# Patient Record
Sex: MALE | Race: WHITE | NOT HISPANIC OR LATINO
[De-identification: names, ages, dates, MRNs, and addresses within clinical notes are randomized per-mention and may not be internally consistent; named-entity substitution may affect disease eponyms.]

---

## 2018-10-21 ENCOUNTER — TRANSCRIPTION ENCOUNTER (OUTPATIENT)
Age: 78
End: 2018-10-21

## 2018-10-23 PROBLEM — Z00.00 ENCOUNTER FOR PREVENTIVE HEALTH EXAMINATION: Status: ACTIVE | Noted: 2018-10-23

## 2018-10-25 ENCOUNTER — APPOINTMENT (OUTPATIENT)
Dept: ORTHOPEDIC SURGERY | Facility: CLINIC | Age: 78
End: 2018-10-25
Payer: MEDICARE

## 2018-10-25 DIAGNOSIS — Z86.39 PERSONAL HISTORY OF OTHER ENDOCRINE, NUTRITIONAL AND METABOLIC DISEASE: ICD-10-CM

## 2018-10-25 DIAGNOSIS — Z86.79 PERSONAL HISTORY OF OTHER DISEASES OF THE CIRCULATORY SYSTEM: ICD-10-CM

## 2018-10-25 DIAGNOSIS — S52.572A OTHER INTRAARTICULAR FRACTURE OF LOWER END OF LEFT RADIUS, INITIAL ENCOUNTER FOR CLOSED FRACTURE: ICD-10-CM

## 2018-10-25 PROCEDURE — 29125 APPL SHORT ARM SPLINT STATIC: CPT | Mod: LT

## 2018-10-25 PROCEDURE — 73110 X-RAY EXAM OF WRIST: CPT | Mod: 26,LT

## 2018-10-25 PROCEDURE — 99203 OFFICE O/P NEW LOW 30 MIN: CPT | Mod: 25

## 2018-11-01 ENCOUNTER — APPOINTMENT (OUTPATIENT)
Dept: ORTHOPEDIC SURGERY | Facility: CLINIC | Age: 78
End: 2018-11-01
Payer: MEDICARE

## 2018-11-01 PROCEDURE — 99213 OFFICE O/P EST LOW 20 MIN: CPT

## 2018-11-01 PROCEDURE — 73110 X-RAY EXAM OF WRIST: CPT | Mod: 26,LT

## 2018-11-08 ENCOUNTER — APPOINTMENT (OUTPATIENT)
Dept: ORTHOPEDIC SURGERY | Facility: CLINIC | Age: 78
End: 2018-11-08

## 2018-11-12 ENCOUNTER — APPOINTMENT (OUTPATIENT)
Dept: ORTHOPEDIC SURGERY | Facility: CLINIC | Age: 78
End: 2018-11-12
Payer: MEDICARE

## 2018-11-12 VITALS — HEIGHT: 65 IN | BODY MASS INDEX: 31.65 KG/M2 | WEIGHT: 190 LBS

## 2018-11-12 PROCEDURE — 29075 APPL CST ELBW FNGR SHORT ARM: CPT | Mod: LT

## 2018-11-12 PROCEDURE — 99213 OFFICE O/P EST LOW 20 MIN: CPT | Mod: 25

## 2018-11-12 PROCEDURE — 73110 X-RAY EXAM OF WRIST: CPT | Mod: 26,LT

## 2018-12-06 ENCOUNTER — APPOINTMENT (OUTPATIENT)
Dept: ORTHOPEDIC SURGERY | Facility: CLINIC | Age: 78
End: 2018-12-06
Payer: MEDICARE

## 2018-12-06 PROCEDURE — 99213 OFFICE O/P EST LOW 20 MIN: CPT

## 2018-12-06 PROCEDURE — 73110 X-RAY EXAM OF WRIST: CPT | Mod: 26,LT

## 2018-12-19 PROBLEM — S52.572D OTHER CLOSED INTRA-ARTICULAR FRACTURE OF DISTAL END OF LEFT RADIUS WITH ROUTINE HEALING, SUBSEQUENT ENCOUNTER: Status: ACTIVE | Noted: 2018-10-31

## 2018-12-20 ENCOUNTER — APPOINTMENT (OUTPATIENT)
Dept: ORTHOPEDIC SURGERY | Facility: CLINIC | Age: 78
End: 2018-12-20
Payer: MEDICARE

## 2018-12-20 VITALS — HEIGHT: 65 IN | WEIGHT: 190 LBS | BODY MASS INDEX: 31.65 KG/M2

## 2018-12-20 DIAGNOSIS — S52.572D OTHER INTRAARTICULAR FRACTURE OF LOWER END OF LEFT RADIUS, SUBSEQUENT ENCOUNTER FOR CLOSED FRACTURE WITH ROUTINE HEALING: ICD-10-CM

## 2018-12-20 PROCEDURE — 99213 OFFICE O/P EST LOW 20 MIN: CPT

## 2018-12-20 PROCEDURE — 73110 X-RAY EXAM OF WRIST: CPT | Mod: 26,LT

## 2019-05-07 ENCOUNTER — TRANSCRIPTION ENCOUNTER (OUTPATIENT)
Age: 79
End: 2019-05-07

## 2019-07-18 ENCOUNTER — APPOINTMENT (OUTPATIENT)
Dept: ULTRASOUND IMAGING | Facility: CLINIC | Age: 79
End: 2019-07-18
Payer: MEDICARE

## 2019-07-18 PROCEDURE — 76775 US EXAM ABDO BACK WALL LIM: CPT

## 2020-06-16 ENCOUNTER — TRANSCRIPTION ENCOUNTER (OUTPATIENT)
Age: 80
End: 2020-06-16

## 2020-07-02 ENCOUNTER — TRANSCRIPTION ENCOUNTER (OUTPATIENT)
Age: 80
End: 2020-07-02

## 2020-10-27 ENCOUNTER — OFFICE (OUTPATIENT)
Dept: URBAN - METROPOLITAN AREA CLINIC 38 | Facility: CLINIC | Age: 80
Setting detail: OPHTHALMOLOGY
End: 2020-10-27
Payer: COMMERCIAL

## 2020-10-27 DIAGNOSIS — H11.153: ICD-10-CM

## 2020-10-27 DIAGNOSIS — H18.503: ICD-10-CM

## 2020-10-27 DIAGNOSIS — H02.834: ICD-10-CM

## 2020-10-27 DIAGNOSIS — E11.9: ICD-10-CM

## 2020-10-27 DIAGNOSIS — H02.831: ICD-10-CM

## 2020-10-27 DIAGNOSIS — H25.13: ICD-10-CM

## 2020-10-27 DIAGNOSIS — H52.4: ICD-10-CM

## 2020-10-27 PROBLEM — H43.393 VITREOUS FLOATERS; BOTH EYES: Status: ACTIVE | Noted: 2020-10-27

## 2020-10-27 PROCEDURE — 92015 DETERMINE REFRACTIVE STATE: CPT | Performed by: OPHTHALMOLOGY

## 2020-10-27 PROCEDURE — 92004 COMPRE OPH EXAM NEW PT 1/>: CPT | Performed by: OPHTHALMOLOGY

## 2020-10-27 ASSESSMENT — REFRACTION_AUTOREFRACTION
OD_CYLINDER: -2.50
OS_CYLINDER: -2.50
OD_SPHERE: PLANO
OS_SPHERE: PLANO
OD_AXIS: 089
OS_AXIS: 079

## 2020-10-27 ASSESSMENT — REFRACTION_MANIFEST
OU_VA: 20/25-2
OS_VA2: 20/20
OS_VA1: 20/30
OD_CYLINDER: -2.00
OD_VA1: 20/30
OS_AXIS: 080
OS_CYLINDER: -2.00
OS_ADD: +1.00
OS_SPHERE: PLANO
OD_SPHERE: PLANO
OU_VA: 20/25-2
OS_SPHERE: PLANO
OS_AXIS: 080
OD_AXIS: 090
OD_SPHERE: PLANO
OS_ADD: +2.75
OD_VA2: 20/20
OD_CYLINDER: -2.00
OS_VA2: 20/20
OD_VA2: 20/20
OS_VA1: 20/30
OD_ADD: +2.75
OD_VA1: 20/30
OD_AXIS: 090
OS_CYLINDER: -2.00
OD_ADD: +1.00

## 2020-10-27 ASSESSMENT — CONFRONTATIONAL VISUAL FIELD TEST (CVF)
OD_FINDINGS: FULL
OS_FINDINGS: FULL

## 2020-10-27 ASSESSMENT — KERATOMETRY
OD_AXISANGLE_DEGREES: 178
OS_K2POWER_DIOPTERS: 45.50
OD_K1POWER_DIOPTERS: 44.25
OS_K1POWER_DIOPTERS: 44.50
METHOD_AUTO_MANUAL: AUTO
OD_K2POWER_DIOPTERS: 45.50
OS_AXISANGLE_DEGREES: 168

## 2020-10-27 ASSESSMENT — VISUAL ACUITY
OS_BCVA: 20/40-2
OD_BCVA: 20/40-2

## 2020-10-27 ASSESSMENT — CORNEAL SURGICAL SCARRING
OD_SCARRING: ANTERIOR
OS_SCARRING: ANTERIOR

## 2020-10-27 ASSESSMENT — LID POSITION - DERMATOCHALASIS
OS_DERMATOCHALASIS: LUL 1+ 2+
OD_DERMATOCHALASIS: RUL 1+ 2+

## 2021-02-16 ENCOUNTER — TRANSCRIPTION ENCOUNTER (OUTPATIENT)
Age: 81
End: 2021-02-16

## 2021-02-22 ENCOUNTER — APPOINTMENT (OUTPATIENT)
Dept: ORTHOPEDIC SURGERY | Facility: CLINIC | Age: 81
End: 2021-02-22
Payer: MEDICARE

## 2021-02-22 VITALS — WEIGHT: 195 LBS | HEIGHT: 65 IN | TEMPERATURE: 97.2 F | BODY MASS INDEX: 32.49 KG/M2

## 2021-02-22 DIAGNOSIS — Z78.9 OTHER SPECIFIED HEALTH STATUS: ICD-10-CM

## 2021-02-22 PROCEDURE — 99214 OFFICE O/P EST MOD 30 MIN: CPT

## 2021-02-22 PROCEDURE — 73110 X-RAY EXAM OF WRIST: CPT | Mod: LT

## 2021-02-22 PROCEDURE — 99072 ADDL SUPL MATRL&STAF TM PHE: CPT

## 2021-02-22 RX ORDER — AMLODIPINE BESYLATE 10 MG/1
10 TABLET ORAL
Qty: 30 | Refills: 0 | Status: ACTIVE | COMMUNITY
Start: 2021-02-04

## 2021-02-22 RX ORDER — ALENDRONATE SODIUM 70 MG/1
70 TABLET ORAL
Qty: 12 | Refills: 0 | Status: ACTIVE | COMMUNITY
Start: 2021-02-09

## 2021-02-22 RX ORDER — LANCETS 30 GAUGE
EACH MISCELLANEOUS
Qty: 100 | Refills: 0 | Status: ACTIVE | COMMUNITY
Start: 2021-02-09

## 2021-02-22 RX ORDER — LOSARTAN POTASSIUM 25 MG/1
25 TABLET, FILM COATED ORAL
Qty: 30 | Refills: 0 | Status: ACTIVE | COMMUNITY
Start: 2021-02-04

## 2021-02-22 RX ORDER — BLOOD SUGAR DIAGNOSTIC
STRIP MISCELLANEOUS
Qty: 100 | Refills: 0 | Status: ACTIVE | COMMUNITY
Start: 2021-02-09

## 2021-02-22 RX ORDER — PRAVASTATIN SODIUM 40 MG/1
40 TABLET ORAL
Qty: 30 | Refills: 0 | Status: ACTIVE | COMMUNITY
Start: 2021-02-04

## 2021-02-22 RX ORDER — CLOPIDOGREL BISULFATE 75 MG/1
75 TABLET, FILM COATED ORAL
Qty: 30 | Refills: 0 | Status: ACTIVE | COMMUNITY
Start: 2021-02-04

## 2021-02-22 RX ORDER — METOPROLOL SUCCINATE 50 MG/1
50 TABLET, EXTENDED RELEASE ORAL
Qty: 30 | Refills: 0 | Status: ACTIVE | COMMUNITY
Start: 2021-02-04

## 2021-02-22 RX ORDER — GLIPIZIDE 5 MG/1
5 TABLET ORAL
Qty: 60 | Refills: 0 | Status: ACTIVE | COMMUNITY
Start: 2021-02-04

## 2021-02-22 RX ORDER — MONTELUKAST 10 MG/1
10 TABLET, FILM COATED ORAL
Qty: 30 | Refills: 0 | Status: ACTIVE | COMMUNITY
Start: 2020-11-23

## 2021-02-22 RX ORDER — PIOGLITAZONE HYDROCHLORIDE 15 MG/1
15 TABLET ORAL
Qty: 30 | Refills: 0 | Status: ACTIVE | COMMUNITY
Start: 2021-02-09

## 2021-02-22 NOTE — PHYSICAL EXAM
[de-identified] : - Constitutional: This is an elderly male in no obvious distress.  \par - Psych: Patient is alert and oriented to person, place and time.  Patient has a normal mood and affect.\par - Cardiovascular: Normal pulses throughout the upper extremities.  No significant varicosities are noted in the upper extremities. \par - Neuro: Strength and sensation are intact throughout the upper extremities.  Patient has normal coordination.\par - Respiratory:  Patient exhibits no evidence of shortness of breath or difficulty breathing.\par - Skin: No rashes, lesions, or other abnormalities are noted in the upper extremities.\par \par ---\par \par Examination of his left wrist after the splint was removed demonstrates diffuse swelling.  The splint appeared quite tight.  There is mild tenderness along the distal radius dorsally.  He has near full flexion and extension of the digits.  He is neurovascularly intact distally. [de-identified] : PA, lateral and oblique radiographs of his left wrist demonstrate his old distal radius fracture which healed with some shortening and approximately 10 degrees of angulation dorsal on the lateral.  There is an old ulnar styloid avulsion fracture.  There is a question of a new fracture along the dorsal ulnar corner.  However, this may represent his old fracture.

## 2021-02-22 NOTE — HISTORY OF PRESENT ILLNESS
[Right] : right hand dominant [FreeTextEntry1] : He comes in today for evaluation of a new injury to his left wrist after a fall 5 days ago.  He went to an urgent care clinic and was told he had a small fracture.  He denies other injuries.\par \par He describes his pain as 2 out of 10.\par \par I treated him greater than 2 years ago for a left distal radius fracture.

## 2021-02-22 NOTE — DISCUSSION/SUMMARY
[FreeTextEntry1] : He has findings consistent with a possible nondisplaced left distal radius fracture after a fall 5 days ago.  This may represent his old fracture.  In either case, if this is a new fracture, it is stable and the treatment would be the same.\par \par I had a discussion regarding today's visit, the diagnosis, and treatment recommendations / options.  At this time I recommended protection with a splint and activity modification.  He will follow-up in 2 weeks to assess his progress.\par \par The patient has agreed to this plan of management and has expressed full understanding.  All questions were fully answered to the patient's satisfaction.\par \par I spent at least 30 minutes in total on this patient's visit.  This included: Preparation for the visit, review of the medical records, review of pertinent diagnostic studies, examination and counseling of the patient on the above diagnosis, treatment plan and prognosis, orders of diagnostic tests, medications and/or appropriate procedures and documentation in the medical records of today's visit.

## 2021-03-01 PROBLEM — S52.502A DISTAL RADIUS FRACTURE, LEFT: Status: ACTIVE | Noted: 2021-02-22

## 2021-03-08 ENCOUNTER — APPOINTMENT (OUTPATIENT)
Dept: ORTHOPEDIC SURGERY | Facility: CLINIC | Age: 81
End: 2021-03-08
Payer: MEDICARE

## 2021-03-08 VITALS — WEIGHT: 195 LBS | HEIGHT: 65 IN | TEMPERATURE: 95.1 F | BODY MASS INDEX: 32.49 KG/M2

## 2021-03-08 DIAGNOSIS — S52.502A UNSPECIFIED FRACTURE OF THE LOWER END OF LEFT RADIUS, INITIAL ENCOUNTER FOR CLOSED FRACTURE: ICD-10-CM

## 2021-03-08 PROCEDURE — 99214 OFFICE O/P EST MOD 30 MIN: CPT

## 2021-03-08 PROCEDURE — 73110 X-RAY EXAM OF WRIST: CPT | Mod: LT

## 2021-03-08 PROCEDURE — 99072 ADDL SUPL MATRL&STAF TM PHE: CPT

## 2021-03-08 NOTE — ADDENDUM
[FreeTextEntry1] : I, Tiffanie Roberson, acted solely as a scribe for Dr. Guerrero on this date on 03/08/2021

## 2021-03-08 NOTE — DISCUSSION/SUMMARY
[FreeTextEntry1] : I had a discussion regarding today's visit, the diagnosis and treatment recommendations / options.  At this time, he was instructed to wear the brace as needed, according to her symptoms.  He will follow-up in 4 weeks if needed.\par \par The patient has agreed to the above plan of management and has expressed full understanding.  All questions were fully answered to the patient's satisfaction.\par \par My time spent on this visit included: Preparation for the visit, review of the medical records, review of pertinent diagnostic studies, examination and counseling of the patient on the above diagnosis, treatment plan and prognosis, orders of diagnostic tests, medications and/or appropriate procedures and documentation in the medical records of today's visit.

## 2021-03-08 NOTE — PHYSICAL EXAM
[de-identified] : - Constitutional: This is an elderly male in no obvious distress.  \par - Psych: Patient is alert and oriented to person, place and time.  Patient has a normal mood and affect.\par - Cardiovascular: Normal pulses throughout the upper extremities.  No significant varicosities are noted in the upper extremities. \par - Neuro: Strength and sensation are intact throughout the upper extremities.  Patient has normal coordination.\par - Respiratory:  Patient exhibits no evidence of shortness of breath or difficulty breathing.\par - Skin: No rashes, lesions, or other abnormalities are noted in the upper extremities.\par \par ---\par \par Examination of his left wrist demonstrates residual although decreased swelling.  There is mild residual tenderness along the distal radius dorsally and ulnarly.  He has near full flexion and extension of the digits.  He has improved flexion and extension of the wrist.  He remains neurovascularly intact distally. [de-identified] : PA, lateral and oblique radiographs of his left wrist demonstrate his old distal radius fracture which healed with some shortening and approximately 10 degrees of angulation dorsal on the lateral.  There is an old ulnar styloid avulsion fracture.  There what appears to be a new fracture along the dorsal ulnar corner.  There is no further displacement compared to his prior radiographs.

## 2021-03-08 NOTE — HISTORY OF PRESENT ILLNESS
[Right] : right hand dominant [FreeTextEntry1] : 19 days status post nondisplaced and incomplete left distal radius fracture.  See note from when he was seen in the office 2 weeks ago.  He was instructed on protection with a splint.\par \par He is doing well and he notes a decrease in his pain.\par \par I treated him greater than 2 years ago for a left distal radius fracture.

## 2021-04-12 ENCOUNTER — TRANSCRIPTION ENCOUNTER (OUTPATIENT)
Age: 81
End: 2021-04-12

## 2021-06-10 ENCOUNTER — APPOINTMENT (OUTPATIENT)
Dept: RADIOLOGY | Facility: CLINIC | Age: 81
End: 2021-06-10
Payer: MEDICARE

## 2021-06-10 ENCOUNTER — APPOINTMENT (OUTPATIENT)
Dept: MRI IMAGING | Facility: CLINIC | Age: 81
End: 2021-06-10

## 2021-06-10 PROCEDURE — 73030 X-RAY EXAM OF SHOULDER: CPT | Mod: RT

## 2021-12-27 ENCOUNTER — APPOINTMENT (OUTPATIENT)
Dept: UROLOGY | Facility: CLINIC | Age: 81
End: 2021-12-27
Payer: MEDICARE

## 2021-12-27 ENCOUNTER — NON-APPOINTMENT (OUTPATIENT)
Age: 81
End: 2021-12-27

## 2021-12-27 VITALS
WEIGHT: 195 LBS | DIASTOLIC BLOOD PRESSURE: 65 MMHG | HEIGHT: 65 IN | SYSTOLIC BLOOD PRESSURE: 153 MMHG | HEART RATE: 59 BPM | TEMPERATURE: 97.6 F | BODY MASS INDEX: 32.49 KG/M2

## 2021-12-27 DIAGNOSIS — N32.81 OVERACTIVE BLADDER: ICD-10-CM

## 2021-12-27 DIAGNOSIS — N40.0 BENIGN PROSTATIC HYPERPLASIA WITHOUT LOWER URINARY TRACT SYMPMS: ICD-10-CM

## 2021-12-27 PROCEDURE — 99204 OFFICE O/P NEW MOD 45 MIN: CPT

## 2021-12-27 RX ORDER — MIRABEGRON 25 MG/1
25 TABLET, FILM COATED, EXTENDED RELEASE ORAL
Qty: 90 | Refills: 2 | Status: ACTIVE | COMMUNITY
Start: 2021-12-27 | End: 1900-01-01

## 2021-12-27 RX ORDER — TIOTROPIUM BROMIDE INHALATION SPRAY 1.56 UG/1
1.25 SPRAY, METERED RESPIRATORY (INHALATION)
Qty: 4 | Refills: 0 | Status: DISCONTINUED | COMMUNITY
Start: 2020-09-04 | End: 2021-12-27

## 2021-12-27 NOTE — ASSESSMENT
[FreeTextEntry1] : Patient with the lower urinary tract symptoms with the main symptoms of urgency and nocturia.\par His rectal exam showed mildly enlarged prostate without any suspicious lesion.\par We checked his post voiding residual and it was 20 cc.\par We discussed with the patient's option to start treatment with the Myrbetriq.\par I prescribed patient's Myrbetriq 25 mg a day.\par I want to see patient in 3 weeks.\par

## 2021-12-27 NOTE — PHYSICAL EXAM
[General Appearance - Well Developed] : well developed [General Appearance - Well Nourished] : well nourished [Normal Appearance] : normal appearance [Well Groomed] : well groomed [General Appearance - In No Acute Distress] : no acute distress [Edema] : no peripheral edema [Respiration, Rhythm And Depth] : normal respiratory rhythm and effort [Exaggerated Use Of Accessory Muscles For Inspiration] : no accessory muscle use [Abdomen Soft] : soft [Abdomen Tenderness] : non-tender [Costovertebral Angle Tenderness] : no ~M costovertebral angle tenderness [Urethral Meatus] : meatus normal [Urinary Bladder Findings] : the bladder was normal on palpation [Scrotum] : the scrotum was normal [Testes Mass (___cm)] : there were no testicular masses [No Prostate Nodules] : no prostate nodules [Prostate Size ___ (0-4)] : prostate size [unfilled] (scale: 0-4) [FreeTextEntry1] : PVR 20 cc [Normal Station and Gait] : the gait and station were normal for the patient's age [] : no rash [No Focal Deficits] : no focal deficits [Oriented To Time, Place, And Person] : oriented to person, place, and time [Affect] : the affect was normal [Mood] : the mood was normal [Not Anxious] : not anxious [No Palpable Adenopathy] : no palpable adenopathy

## 2021-12-27 NOTE — HISTORY OF PRESENT ILLNESS
[Urinary Urgency] : urinary urgency [Urinary Frequency] : urinary frequency [Post-Void Dribbling] : post-void dribbling [None] : None [Constant] : constantly [Daily] : occur daily [Worsening] : worsening [FreeTextEntry1] : 81-year-old patient presented today for the lower urinary tract symptoms.  His main problem is a urgency and nocturia for 3 and more times.\par He described his urine stream is strong and constant.  At the end of urination he is able to empty his bladder.  Has no problem to start urination.\par He denies any urological procedures in the past.\par He denies hematuria and smoking\par His PSA was checked by his primary care physician nearly 2 years ago and was normal.

## 2021-12-28 ENCOUNTER — APPOINTMENT (OUTPATIENT)
Dept: CT IMAGING | Facility: CLINIC | Age: 81
End: 2021-12-28
Payer: MEDICARE

## 2021-12-28 PROCEDURE — 70450 CT HEAD/BRAIN W/O DYE: CPT

## 2022-01-07 PROCEDURE — 70450 CT HEAD/BRAIN W/O DYE: CPT | Mod: 26

## 2022-01-07 PROCEDURE — 93010 ELECTROCARDIOGRAM REPORT: CPT

## 2022-01-07 PROCEDURE — 99285 EMERGENCY DEPT VISIT HI MDM: CPT

## 2022-01-07 PROCEDURE — 71045 X-RAY EXAM CHEST 1 VIEW: CPT | Mod: 26

## 2022-01-08 ENCOUNTER — INPATIENT (INPATIENT)
Facility: HOSPITAL | Age: 82
LOS: 2 days | Discharge: HOME | End: 2022-01-11
Attending: STUDENT IN AN ORGANIZED HEALTH CARE EDUCATION/TRAINING PROGRAM
Payer: MEDICARE

## 2022-01-08 ENCOUNTER — OUTPATIENT (OUTPATIENT)
Dept: OUTPATIENT SERVICES | Facility: HOSPITAL | Age: 82
LOS: 1 days | End: 2022-01-08

## 2022-01-09 ENCOUNTER — OUTPATIENT (OUTPATIENT)
Dept: OUTPATIENT SERVICES | Facility: HOSPITAL | Age: 82
LOS: 1 days | End: 2022-01-09

## 2022-01-09 PROCEDURE — 93010 ELECTROCARDIOGRAM REPORT: CPT

## 2022-01-09 PROCEDURE — 93306 TTE W/DOPPLER COMPLETE: CPT | Mod: 26

## 2022-01-10 ENCOUNTER — OUTPATIENT (OUTPATIENT)
Dept: OUTPATIENT SERVICES | Facility: HOSPITAL | Age: 82
LOS: 1 days | End: 2022-01-10

## 2022-01-11 ENCOUNTER — OUTPATIENT (OUTPATIENT)
Dept: OUTPATIENT SERVICES | Facility: HOSPITAL | Age: 82
LOS: 1 days | End: 2022-01-11

## 2022-01-11 PROCEDURE — 99223 1ST HOSP IP/OBS HIGH 75: CPT

## 2022-01-27 DIAGNOSIS — N32.81 OVERACTIVE BLADDER: ICD-10-CM

## 2022-01-27 DIAGNOSIS — I10 ESSENTIAL (PRIMARY) HYPERTENSION: ICD-10-CM

## 2022-01-27 DIAGNOSIS — E11.9 TYPE 2 DIABETES MELLITUS WITHOUT COMPLICATIONS: ICD-10-CM

## 2022-01-27 DIAGNOSIS — Z95.4 PRESENCE OF OTHER HEART-VALVE REPLACEMENT: ICD-10-CM

## 2022-01-27 DIAGNOSIS — Z79.01 LONG TERM (CURRENT) USE OF ANTICOAGULANTS: ICD-10-CM

## 2022-01-27 DIAGNOSIS — G30.1 ALZHEIMER'S DISEASE WITH LATE ONSET: ICD-10-CM

## 2022-01-27 DIAGNOSIS — N17.9 ACUTE KIDNEY FAILURE, UNSPECIFIED: ICD-10-CM

## 2022-01-27 DIAGNOSIS — U07.1 COVID-19: ICD-10-CM

## 2022-01-27 DIAGNOSIS — Z87.891 PERSONAL HISTORY OF NICOTINE DEPENDENCE: ICD-10-CM

## 2022-01-27 DIAGNOSIS — I48.0 PAROXYSMAL ATRIAL FIBRILLATION: ICD-10-CM

## 2022-01-27 DIAGNOSIS — I44.1 ATRIOVENTRICULAR BLOCK, SECOND DEGREE: ICD-10-CM

## 2022-01-27 DIAGNOSIS — E78.5 HYPERLIPIDEMIA, UNSPECIFIED: ICD-10-CM

## 2022-01-27 DIAGNOSIS — Z95.5 PRESENCE OF CORONARY ANGIOPLASTY IMPLANT AND GRAFT: ICD-10-CM

## 2022-01-27 DIAGNOSIS — Z86.711 PERSONAL HISTORY OF PULMONARY EMBOLISM: ICD-10-CM

## 2022-01-27 DIAGNOSIS — I25.10 ATHEROSCLEROTIC HEART DISEASE OF NATIVE CORONARY ARTERY WITHOUT ANGINA PECTORIS: ICD-10-CM

## 2022-01-27 DIAGNOSIS — D53.9 NUTRITIONAL ANEMIA, UNSPECIFIED: ICD-10-CM

## 2022-01-27 DIAGNOSIS — R55 SYNCOPE AND COLLAPSE: ICD-10-CM

## 2022-01-27 DIAGNOSIS — F02.80 DEMENTIA IN OTHER DISEASES CLASSIFIED ELSEWHERE, UNSPECIFIED SEVERITY, WITHOUT BEHAVIORAL DISTURBANCE, PSYCHOTIC DISTURBANCE, MOOD DISTURBANCE, AND ANXIETY: ICD-10-CM

## 2022-02-04 DIAGNOSIS — U07.1 COVID-19: ICD-10-CM

## 2022-02-04 DIAGNOSIS — R00.1 BRADYCARDIA, UNSPECIFIED: ICD-10-CM

## 2022-02-04 DIAGNOSIS — N17.9 ACUTE KIDNEY FAILURE, UNSPECIFIED: ICD-10-CM

## 2022-02-04 DIAGNOSIS — R55 SYNCOPE AND COLLAPSE: ICD-10-CM

## 2022-02-07 ENCOUNTER — APPOINTMENT (OUTPATIENT)
Dept: UROLOGY | Facility: CLINIC | Age: 82
End: 2022-02-07

## 2022-02-07 DIAGNOSIS — N17.9 ACUTE KIDNEY FAILURE, UNSPECIFIED: ICD-10-CM

## 2022-02-07 DIAGNOSIS — U07.1 COVID-19: ICD-10-CM

## 2022-02-07 DIAGNOSIS — R00.1 BRADYCARDIA, UNSPECIFIED: ICD-10-CM

## 2022-02-07 DIAGNOSIS — R55 SYNCOPE AND COLLAPSE: ICD-10-CM

## 2022-02-07 DIAGNOSIS — I45.5 OTHER SPECIFIED HEART BLOCK: ICD-10-CM

## 2022-02-20 DIAGNOSIS — R00.1 BRADYCARDIA, UNSPECIFIED: ICD-10-CM

## 2022-02-20 DIAGNOSIS — R55 SYNCOPE AND COLLAPSE: ICD-10-CM

## 2022-02-20 DIAGNOSIS — U07.1 COVID-19: ICD-10-CM

## 2022-02-20 DIAGNOSIS — N17.9 ACUTE KIDNEY FAILURE, UNSPECIFIED: ICD-10-CM

## 2022-02-20 DIAGNOSIS — I45.5 OTHER SPECIFIED HEART BLOCK: ICD-10-CM

## 2023-11-29 ENCOUNTER — INPATIENT (INPATIENT)
Facility: HOSPITAL | Age: 83
LOS: 1 days | Discharge: ANOTHER HOSPITAL | DRG: 177 | End: 2023-12-01
Attending: INTERNAL MEDICINE | Admitting: INTERNAL MEDICINE
Payer: MEDICARE

## 2023-11-29 VITALS
OXYGEN SATURATION: 100 % | DIASTOLIC BLOOD PRESSURE: 72 MMHG | HEART RATE: 89 BPM | SYSTOLIC BLOOD PRESSURE: 119 MMHG | RESPIRATION RATE: 22 BRPM | TEMPERATURE: 98 F | WEIGHT: 184.97 LBS

## 2023-11-29 DIAGNOSIS — I10 ESSENTIAL (PRIMARY) HYPERTENSION: ICD-10-CM

## 2023-11-29 DIAGNOSIS — R06.02 SHORTNESS OF BREATH: ICD-10-CM

## 2023-11-29 DIAGNOSIS — Z29.9 ENCOUNTER FOR PROPHYLACTIC MEASURES, UNSPECIFIED: ICD-10-CM

## 2023-11-29 DIAGNOSIS — I50.23 ACUTE ON CHRONIC SYSTOLIC (CONGESTIVE) HEART FAILURE: ICD-10-CM

## 2023-11-29 DIAGNOSIS — E78.5 HYPERLIPIDEMIA, UNSPECIFIED: ICD-10-CM

## 2023-11-29 DIAGNOSIS — Z95.2 PRESENCE OF PROSTHETIC HEART VALVE: Chronic | ICD-10-CM

## 2023-11-29 DIAGNOSIS — R91.8 OTHER NONSPECIFIC ABNORMAL FINDING OF LUNG FIELD: ICD-10-CM

## 2023-11-29 DIAGNOSIS — Z98.890 OTHER SPECIFIED POSTPROCEDURAL STATES: Chronic | ICD-10-CM

## 2023-11-29 DIAGNOSIS — I35.0 NONRHEUMATIC AORTIC (VALVE) STENOSIS: ICD-10-CM

## 2023-11-29 DIAGNOSIS — E11.9 TYPE 2 DIABETES MELLITUS WITHOUT COMPLICATIONS: ICD-10-CM

## 2023-11-29 DIAGNOSIS — Z90.49 ACQUIRED ABSENCE OF OTHER SPECIFIED PARTS OF DIGESTIVE TRACT: Chronic | ICD-10-CM

## 2023-11-29 LAB
ALBUMIN SERPL ELPH-MCNC: 3.6 G/DL — SIGNIFICANT CHANGE UP (ref 3.3–5)
ALP SERPL-CCNC: 55 U/L — SIGNIFICANT CHANGE UP (ref 40–120)
ALT FLD-CCNC: 28 U/L — SIGNIFICANT CHANGE UP (ref 10–45)
ANION GAP SERPL CALC-SCNC: 8 MMOL/L — SIGNIFICANT CHANGE UP (ref 5–17)
ANISOCYTOSIS BLD QL: SLIGHT — SIGNIFICANT CHANGE UP
APTT BLD: 35.3 SEC — SIGNIFICANT CHANGE UP (ref 24.5–35.6)
AST SERPL-CCNC: 47 U/L — HIGH (ref 10–40)
BASE EXCESS BLDV CALC-SCNC: -0.1 MMOL/L — SIGNIFICANT CHANGE UP (ref -2–3)
BASOPHILS # BLD AUTO: 0 K/UL — SIGNIFICANT CHANGE UP (ref 0–0.2)
BASOPHILS NFR BLD AUTO: 0 % — SIGNIFICANT CHANGE UP (ref 0–2)
BILIRUB SERPL-MCNC: 0.4 MG/DL — SIGNIFICANT CHANGE UP (ref 0.2–1.2)
BUN SERPL-MCNC: 37 MG/DL — HIGH (ref 7–23)
CALCIUM SERPL-MCNC: 9.7 MG/DL — SIGNIFICANT CHANGE UP (ref 8.4–10.5)
CHLORIDE SERPL-SCNC: 99 MMOL/L — SIGNIFICANT CHANGE UP (ref 96–108)
CO2 BLDV-SCNC: 27 MMOL/L — HIGH (ref 22–26)
CO2 SERPL-SCNC: 29 MMOL/L — SIGNIFICANT CHANGE UP (ref 22–31)
CREAT SERPL-MCNC: 2.08 MG/DL — HIGH (ref 0.5–1.3)
EGFR: 31 ML/MIN/1.73M2 — LOW
EOSINOPHIL # BLD AUTO: 0.14 K/UL — SIGNIFICANT CHANGE UP (ref 0–0.5)
EOSINOPHIL NFR BLD AUTO: 2 % — SIGNIFICANT CHANGE UP (ref 0–6)
GAS PNL BLDV: SIGNIFICANT CHANGE UP
GLUCOSE BLDC GLUCOMTR-MCNC: 403 MG/DL — HIGH (ref 70–99)
GLUCOSE BLDC GLUCOMTR-MCNC: 439 MG/DL — HIGH (ref 70–99)
GLUCOSE SERPL-MCNC: 317 MG/DL — HIGH (ref 70–99)
HCO3 BLDV-SCNC: 26 MMOL/L — SIGNIFICANT CHANGE UP (ref 22–29)
HCT VFR BLD CALC: 31.1 % — LOW (ref 39–50)
HGB BLD-MCNC: 10.1 G/DL — LOW (ref 13–17)
INR BLD: 1.42 RATIO — HIGH (ref 0.85–1.18)
LACTATE SERPL-SCNC: 1.9 MMOL/L — SIGNIFICANT CHANGE UP (ref 0.7–2)
LYMPHOCYTES # BLD AUTO: 0.49 K/UL — LOW (ref 1–3.3)
LYMPHOCYTES # BLD AUTO: 7 % — LOW (ref 13–44)
MACROCYTES BLD QL: SLIGHT — SIGNIFICANT CHANGE UP
MAGNESIUM SERPL-MCNC: 2.1 MG/DL — SIGNIFICANT CHANGE UP (ref 1.6–2.6)
MANUAL SMEAR VERIFICATION: SIGNIFICANT CHANGE UP
MCHC RBC-ENTMCNC: 32.5 GM/DL — SIGNIFICANT CHANGE UP (ref 32–36)
MCHC RBC-ENTMCNC: 35.3 PG — HIGH (ref 27–34)
MCV RBC AUTO: 108.7 FL — HIGH (ref 80–100)
MONOCYTES # BLD AUTO: 0.83 K/UL — SIGNIFICANT CHANGE UP (ref 0–0.9)
MONOCYTES NFR BLD AUTO: 12 % — SIGNIFICANT CHANGE UP (ref 2–14)
NEUTROPHILS # BLD AUTO: 5.49 K/UL — SIGNIFICANT CHANGE UP (ref 1.8–7.4)
NEUTROPHILS NFR BLD AUTO: 79 % — HIGH (ref 43–77)
NRBC # BLD: 0 /100 — SIGNIFICANT CHANGE UP (ref 0–0)
NT-PROBNP SERPL-SCNC: HIGH PG/ML (ref 0–300)
PCO2 BLDV: 49 MMHG — SIGNIFICANT CHANGE UP (ref 42–55)
PH BLDV: 7.33 — SIGNIFICANT CHANGE UP (ref 7.32–7.43)
PLAT MORPH BLD: NORMAL — SIGNIFICANT CHANGE UP
PLATELET # BLD AUTO: 140 K/UL — LOW (ref 150–400)
PO2 BLDV: <35 MMHG — LOW (ref 25–45)
POTASSIUM SERPL-MCNC: 4.5 MMOL/L — SIGNIFICANT CHANGE UP (ref 3.5–5.3)
POTASSIUM SERPL-SCNC: 4.5 MMOL/L — SIGNIFICANT CHANGE UP (ref 3.5–5.3)
PROT SERPL-MCNC: 7.7 G/DL — SIGNIFICANT CHANGE UP (ref 6–8.3)
PROTHROM AB SERPL-ACNC: 16.4 SEC — HIGH (ref 9.5–13)
RAPID RVP RESULT: DETECTED
RBC # BLD: 2.86 M/UL — LOW (ref 4.2–5.8)
RBC # FLD: 17.3 % — HIGH (ref 10.3–14.5)
RBC BLD AUTO: ABNORMAL
SAO2 % BLDV: 27.7 % — LOW (ref 67–88)
SARS-COV-2 RNA SPEC QL NAA+PROBE: DETECTED
SODIUM SERPL-SCNC: 136 MMOL/L — SIGNIFICANT CHANGE UP (ref 135–145)
TROPONIN I, HIGH SENSITIVITY RESULT: 4364.8 NG/L — HIGH
TROPONIN I, HIGH SENSITIVITY RESULT: 4762.3 NG/L — HIGH
WBC # BLD: 6.95 K/UL — SIGNIFICANT CHANGE UP (ref 3.8–10.5)
WBC # FLD AUTO: 6.95 K/UL — SIGNIFICANT CHANGE UP (ref 3.8–10.5)

## 2023-11-29 PROCEDURE — 93010 ELECTROCARDIOGRAM REPORT: CPT

## 2023-11-29 PROCEDURE — 71045 X-RAY EXAM CHEST 1 VIEW: CPT | Mod: 26

## 2023-11-29 PROCEDURE — 99222 1ST HOSP IP/OBS MODERATE 55: CPT

## 2023-11-29 PROCEDURE — 93306 TTE W/DOPPLER COMPLETE: CPT | Mod: 26

## 2023-11-29 PROCEDURE — 99285 EMERGENCY DEPT VISIT HI MDM: CPT

## 2023-11-29 RX ORDER — CHLORHEXIDINE GLUCONATE 213 G/1000ML
1 SOLUTION TOPICAL
Refills: 0 | Status: DISCONTINUED | OUTPATIENT
Start: 2023-11-29 | End: 2023-12-01

## 2023-11-29 RX ORDER — IPRATROPIUM/ALBUTEROL SULFATE 18-103MCG
3 AEROSOL WITH ADAPTER (GRAM) INHALATION EVERY 6 HOURS
Refills: 0 | Status: DISCONTINUED | OUTPATIENT
Start: 2023-11-29 | End: 2023-11-29

## 2023-11-29 RX ORDER — FUROSEMIDE 40 MG
40 TABLET ORAL ONCE
Refills: 0 | Status: COMPLETED | OUTPATIENT
Start: 2023-11-29 | End: 2023-11-29

## 2023-11-29 RX ORDER — INSULIN LISPRO 100/ML
VIAL (ML) SUBCUTANEOUS EVERY 6 HOURS
Refills: 0 | Status: DISCONTINUED | OUTPATIENT
Start: 2023-11-29 | End: 2023-11-30

## 2023-11-29 RX ORDER — HEPARIN SODIUM 5000 [USP'U]/ML
5000 INJECTION INTRAVENOUS; SUBCUTANEOUS ONCE
Refills: 0 | Status: COMPLETED | OUTPATIENT
Start: 2023-11-29 | End: 2023-11-29

## 2023-11-29 RX ORDER — REMDESIVIR 5 MG/ML
100 INJECTION INTRAVENOUS EVERY 24 HOURS
Refills: 0 | Status: DISCONTINUED | OUTPATIENT
Start: 2023-11-30 | End: 2023-12-01

## 2023-11-29 RX ORDER — CEFEPIME 1 G/1
2000 INJECTION, POWDER, FOR SOLUTION INTRAMUSCULAR; INTRAVENOUS ONCE
Refills: 0 | Status: COMPLETED | OUTPATIENT
Start: 2023-11-29 | End: 2023-11-29

## 2023-11-29 RX ORDER — HEPARIN SODIUM 5000 [USP'U]/ML
INJECTION INTRAVENOUS; SUBCUTANEOUS
Qty: 25000 | Refills: 0 | Status: DISCONTINUED | OUTPATIENT
Start: 2023-11-29 | End: 2023-11-30

## 2023-11-29 RX ORDER — AMLODIPINE BESYLATE 2.5 MG/1
10 TABLET ORAL DAILY
Refills: 0 | Status: DISCONTINUED | OUTPATIENT
Start: 2023-11-29 | End: 2023-11-30

## 2023-11-29 RX ORDER — METOPROLOL TARTRATE 50 MG
25 TABLET ORAL EVERY 12 HOURS
Refills: 0 | Status: DISCONTINUED | OUTPATIENT
Start: 2023-11-29 | End: 2023-11-30

## 2023-11-29 RX ORDER — CEFEPIME 1 G/1
2000 INJECTION, POWDER, FOR SOLUTION INTRAMUSCULAR; INTRAVENOUS EVERY 12 HOURS
Refills: 0 | Status: DISCONTINUED | OUTPATIENT
Start: 2023-11-29 | End: 2023-12-01

## 2023-11-29 RX ORDER — SODIUM CHLORIDE 9 MG/ML
500 INJECTION INTRAMUSCULAR; INTRAVENOUS; SUBCUTANEOUS ONCE
Refills: 0 | Status: DISCONTINUED | OUTPATIENT
Start: 2023-11-29 | End: 2023-11-29

## 2023-11-29 RX ORDER — INSULIN GLARGINE 100 [IU]/ML
15 INJECTION, SOLUTION SUBCUTANEOUS AT BEDTIME
Refills: 0 | Status: DISCONTINUED | OUTPATIENT
Start: 2023-11-29 | End: 2023-12-01

## 2023-11-29 RX ORDER — ASPIRIN/CALCIUM CARB/MAGNESIUM 324 MG
81 TABLET ORAL DAILY
Refills: 0 | Status: DISCONTINUED | OUTPATIENT
Start: 2023-11-30 | End: 2023-12-01

## 2023-11-29 RX ORDER — REMDESIVIR 5 MG/ML
200 INJECTION INTRAVENOUS EVERY 24 HOURS
Refills: 0 | Status: COMPLETED | OUTPATIENT
Start: 2023-11-29 | End: 2023-11-29

## 2023-11-29 RX ORDER — IPRATROPIUM/ALBUTEROL SULFATE 18-103MCG
3 AEROSOL WITH ADAPTER (GRAM) INHALATION ONCE
Refills: 0 | Status: DISCONTINUED | OUTPATIENT
Start: 2023-11-29 | End: 2023-11-29

## 2023-11-29 RX ORDER — PANTOPRAZOLE SODIUM 20 MG/1
40 TABLET, DELAYED RELEASE ORAL
Refills: 0 | Status: DISCONTINUED | OUTPATIENT
Start: 2023-11-29 | End: 2023-12-01

## 2023-11-29 RX ORDER — ASPIRIN/CALCIUM CARB/MAGNESIUM 324 MG
325 TABLET ORAL ONCE
Refills: 0 | Status: COMPLETED | OUTPATIENT
Start: 2023-11-29 | End: 2023-11-29

## 2023-11-29 RX ORDER — ACETAMINOPHEN 500 MG
650 TABLET ORAL EVERY 6 HOURS
Refills: 0 | Status: DISCONTINUED | OUTPATIENT
Start: 2023-11-29 | End: 2023-12-01

## 2023-11-29 RX ORDER — ALBUTEROL 90 UG/1
1 AEROSOL, METERED ORAL EVERY 4 HOURS
Refills: 0 | Status: DISCONTINUED | OUTPATIENT
Start: 2023-11-29 | End: 2023-12-01

## 2023-11-29 RX ORDER — HEPARIN SODIUM 5000 [USP'U]/ML
5000 INJECTION INTRAVENOUS; SUBCUTANEOUS EVERY 6 HOURS
Refills: 0 | Status: DISCONTINUED | OUTPATIENT
Start: 2023-11-29 | End: 2023-11-30

## 2023-11-29 RX ORDER — DEXAMETHASONE 0.5 MG/5ML
6 ELIXIR ORAL DAILY
Refills: 0 | Status: DISCONTINUED | OUTPATIENT
Start: 2023-11-29 | End: 2023-12-01

## 2023-11-29 RX ORDER — ASCORBIC ACID 60 MG
500 TABLET,CHEWABLE ORAL DAILY
Refills: 0 | Status: DISCONTINUED | OUTPATIENT
Start: 2023-11-29 | End: 2023-11-30

## 2023-11-29 RX ORDER — SIMVASTATIN 20 MG/1
40 TABLET, FILM COATED ORAL AT BEDTIME
Refills: 0 | Status: DISCONTINUED | OUTPATIENT
Start: 2023-11-29 | End: 2023-12-01

## 2023-11-29 RX ORDER — REMDESIVIR 5 MG/ML
INJECTION INTRAVENOUS
Refills: 0 | Status: DISCONTINUED | OUTPATIENT
Start: 2023-11-29 | End: 2023-12-01

## 2023-11-29 RX ORDER — IPRATROPIUM/ALBUTEROL SULFATE 18-103MCG
3 AEROSOL WITH ADAPTER (GRAM) INHALATION
Refills: 0 | Status: COMPLETED | OUTPATIENT
Start: 2023-11-29 | End: 2023-11-29

## 2023-11-29 RX ADMIN — Medication 125 MILLIGRAM(S): at 14:32

## 2023-11-29 RX ADMIN — Medication 325 MILLIGRAM(S): at 19:30

## 2023-11-29 RX ADMIN — SIMVASTATIN 40 MILLIGRAM(S): 20 TABLET, FILM COATED ORAL at 22:36

## 2023-11-29 RX ADMIN — Medication 3 MILLILITER(S): at 14:12

## 2023-11-29 RX ADMIN — Medication 12: at 23:52

## 2023-11-29 RX ADMIN — Medication 3 MILLILITER(S): at 13:52

## 2023-11-29 RX ADMIN — Medication 100 MILLIGRAM(S): at 22:35

## 2023-11-29 RX ADMIN — Medication 40 MILLIGRAM(S): at 15:33

## 2023-11-29 RX ADMIN — HEPARIN SODIUM 5000 UNIT(S): 5000 INJECTION INTRAVENOUS; SUBCUTANEOUS at 19:28

## 2023-11-29 RX ADMIN — CEFEPIME 2000 MILLIGRAM(S): 1 INJECTION, POWDER, FOR SOLUTION INTRAMUSCULAR; INTRAVENOUS at 16:07

## 2023-11-29 RX ADMIN — REMDESIVIR 200 MILLIGRAM(S): 5 INJECTION INTRAVENOUS at 23:51

## 2023-11-29 RX ADMIN — Medication 3 MILLILITER(S): at 14:32

## 2023-11-29 RX ADMIN — CEFEPIME 100 MILLIGRAM(S): 1 INJECTION, POWDER, FOR SOLUTION INTRAMUSCULAR; INTRAVENOUS at 15:33

## 2023-11-29 RX ADMIN — HEPARIN SODIUM 1000 UNIT(S)/HR: 5000 INJECTION INTRAVENOUS; SUBCUTANEOUS at 19:29

## 2023-11-29 NOTE — H&P ADULT - PROBLEM SELECTOR PLAN 3
status post TAVR, TTE this evening with good function of valve.  Continue AC with heparin, medical management

## 2023-11-29 NOTE — ED ADULT TRIAGE NOTE - NS ED NURSE AMBULANCES
[facility]

## 2023-11-29 NOTE — ED PROVIDER NOTE - PHYSICAL EXAMINATION
VITAL SIGNS: I have reviewed nursing notes and confirm.   GEN: Well-developed; well-nourished; in mild-mod acute respiratory stress. Speaking full sentences.  SKIN: Warm, pink, no rash, no diaphoresis, no cyanosis, well perfused.   HEAD: Normocephalic; atraumatic. No scalp lacerations, no abrasions.  NECK: Supple; non tender.   EYES: Pupils 3mm equal, round, reactive to light and accomodation, conjunctiva and sclera clear. Extra-ocular movements intact bilaterally.  ENT: No nasal discharge; airway clear. Trachea is midline. Normal dentition.  CV: RRR. S1, S2 normal; no murmurs, gallops, or rubs. Capillary refill < 2 seconds throughout. Distal pulses intact 2+ throughout.  RESP: (+) poor air entry, (+) mild rhonchi/wheezing/ bibasilar rales. (+ )belly breathing, retractions.  ABD: Normal bowel sounds, soft, non-distended, non-tender, no rebound, no guarding, no rigidity, no hepatosplenomegaly.  MSK: Normal range of motion and movement of all 4 extremities. No apparent joint or muscular pain throughout.    BACK: No thoracolumbar midline or paravertebral tenderness. No step-offs or obvious deformities.  NEURO: Alert & oriented x 3, Gait: Fluid. Normal speech and coordination.

## 2023-11-29 NOTE — H&P ADULT - NSHPSOCIALHISTORY_GEN_ALL_CORE
Lives in private home with wife, adult children involved in care  Remote history of cigarette use  No history of ETOH abuse, substance abuse, or illicit substance use

## 2023-11-29 NOTE — H&P ADULT - PROBLEM SELECTOR PLAN 5
Found this spring, no PET scan or biopsy done to date.  May have underlying component of COPD worsening SOB, continue bronchodilators RTC.

## 2023-11-29 NOTE — PROGRESS NOTE ADULT - ASSESSMENT
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, type 2 DM, right lung mass  admitted to the ICU For    # AHRF, Type 1  # Covid 19  # LLL PNA  # NSTEMI v Myocarditis 2/2 COVID  # Acute Systolic Heart Failure  # ALEXI    Neuro:  - Avoid Neuro Deliriogenic / sedative medications    CV:  - Maintain MAP > 65  - TTE showing EF of 45-50%, with apical and mid inferior hypokinesis  - ASA/Statin/BB,   - EKG AV Paced  - Avoiding fluid overload given new reduction in EF  - Eventual Ischemic evaluation when medically stable  - Cardiology following    Pulm:  - Continue Decadron for enhanced anti-inflammatory effect  - Actively titrating Bipap settings to maintain SpO2 > 92%, will transition to HFNC as tolerated  - Incentive spirometry as able                  GI:  - NPO while Bipap    Renal:  - Even to net negative fluid balance as tolerated by hemodynamics and renal fx.    - Continue to monitor Bun/Cr and UOP  - Replacing electrolytes as needed with Goal K> 4, PO> 3, Mg> 2               - Strict I&O's  - Avoid Nephro toxic medication  - Renally dose meds    Heme:  - Heparin gtt got NSTEMI    ID:  - Jhony/Dacdron for Covid, Trend renal funciton with Jhony use  - Cefepime for PNA, will add Azithro for Atypical coverage  - Microbiology and Radiology reviewed   - trend CBC with diff, CMP  and fever curve    Endo:  - ISS for aggressive glycemic control to limit FS glucose to < 180mg/dl.  - Keep Euthyroid    COVID 19 specific considerations and therpeutic options based on the available and rapidly changing literature    Critical Care Time (EXCLUSIVE of any non bundled procedures) :  45 minutes were spent assessing the patient's presenting problems of acute illness that pose a high probability of life threatening  deterioration or end organ damage / dysfunction.  Medical desicion making includes initiation / continuation of plan or care review data/ labwork/ radiographic study, direct patient care bedside ,  discussions with  consultants regarding care,  evaluation and interpretation of vital signs, any necessary ventilator management,   NIV or BIPAP changes  or initiation,    discussions with multidisipliary team,  am or pm rounds, discussions of goals of care with patient and family all non-inclusive of procedures.    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, type 2 DM, right lung mass  admitted to the ICU For    # AHRF, Type 1  # Covid 19  # LLL PNA  # NSTEMI v Myocarditis 2/2 COVID  # Acute Systolic Heart Failure  # ALEXI    Neuro:  - Avoid Neuro Deliriogenic / sedative medications    CV:  - Maintain MAP > 65  - TTE showing EF of 45-50%, with apical and mid inferior hypokinesis  - ASA/Statin/BB,   - EKG AV Paced  - Avoiding fluid overload given new reduction in EF  - Eventual Ischemic evaluation when medically stable  - Cardiology following    Pulm:  - Continue Decadron for enhanced anti-inflammatory effect  - Actively titrating Bipap settings to maintain SpO2 > 92%, will transition to HFNC as tolerated  - Incentive spirometry as able                  GI:  - NPO while Bipap    Renal:  - Even to net negative fluid balance as tolerated by hemodynamics and renal fx.    - Continue to monitor Bun/Cr and UOP  - Replacing electrolytes as needed with Goal K> 4, PO> 3, Mg> 2               - Strict I&O's  - Avoid Nephro toxic medication  - Renally dose meds    Heme:  - Heparin gtt for NSTEMI    ID:  - Jhony/Decdron for Covid, Trend renal function with Jhony use  - Cefepime for PNA, will add Azithro for Atypical coverage  - Microbiology and Radiology reviewed   - trend CBC with diff, CMP  and fever curve    Endo:  - ISS for aggressive glycemic control to limit FS glucose to < 180mg/dl.    COVID 19 specific considerations and therapeutic options based on the available and rapidly changing literature    Critical Care Time (EXCLUSIVE of any non bundled procedures) :  45 minutes were spent assessing the patient's presenting problems of acute illness that pose a high probability of life threatening  deterioration or end organ damage / dysfunction.  Medical desicion making includes initiation / continuation of plan or care review data/ labwork/ radiographic study, direct patient care bedside ,  discussions with  consultants regarding care,  evaluation and interpretation of vital signs, any necessary ventilator management,   NIV or BIPAP changes  or initiation,    discussions with multidisipliary team,  am or pm rounds, discussions of goals of care with patient and family all non-inclusive of procedures.    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, type 2 DM, right lung mass  admitted to the ICU For    # AHRF, Type 1  # Covid 19  # LLL PNA  # NSTEMI v Myocarditis 2/2 COVID  # Acute Systolic Heart Failure  # ALEXI    Neuro:  - Avoid Neuro Deliriogenic / sedative medications    CV:  - Maintain MAP > 65  - TTE showing EF of 45-50%, with apical and mid inferior hypokinesis  - ASA/Statin/BB,   - EKG STAT ordered, Trend troponin till peak 4364 -> 4762  - Avoiding fluid overload given new reduction in EF  - Eventual Ischemic evaluation when medically stable  - Cardiology following    Pulm:  - Continue Decadron for enhanced anti-inflammatory effect  - Actively titrating Bipap settings to maintain SpO2 > 92%, will transition to HFNC as tolerated  - Incentive spirometry as able                  GI:  - NPO while Bipap    Renal:  - Even to net negative fluid balance as tolerated by hemodynamics and renal fx.    - Continue to monitor Bun/Cr and UOP  - Replacing electrolytes as needed with Goal K> 4, PO> 3, Mg> 2               - Strict I&O's  - Avoid Nephro toxic medication  - Renally dose meds    Heme:  - Heparin gtt for NSTEMI    ID:  - Jhony/Decdron for Covid, Trend renal function with Jhony use  - Cefepime for PNA, will add Azithro for Atypical coverage  - Microbiology and Radiology reviewed   - trend CBC with diff, CMP  and fever curve    Endo:  - ISS for aggressive glycemic control to limit FS glucose to < 180mg/dl.    COVID 19 specific considerations and therapeutic options based on the available and rapidly changing literature    Critical Care Time (EXCLUSIVE of any non bundled procedures) :  45 minutes were spent assessing the patient's presenting problems of acute illness that pose a high probability of life threatening  deterioration or end organ damage / dysfunction.  Medical desicion making includes initiation / continuation of plan or care review data/ labwork/ radiographic study, direct patient care bedside ,  discussions with  consultants regarding care,  evaluation and interpretation of vital signs, any necessary ventilator management,   NIV or BIPAP changes  or initiation,    discussions with multidisipliary team,  am or pm rounds, discussions of goals of care with patient and family all non-inclusive of procedures.    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, type 2 DM, right lung mass  admitted to the ICU For    # AHRF, Type 1  # Covid 19  # LLL PNA  # NSTEMI v Myocarditis 2/2 COVID  # Acute Systolic Heart Failure  # ALEXI    Neuro:  - Avoid Neuro Deliriogenic / sedative medications    CV:  - Maintain MAP > 65  - TTE showing EF of 45-50%, with apical and mid inferior hypokinesis  - ASA/Statin/BB,   - EKG STAT ordered, Trend troponin till peak 4364 -> 4762  - Avoiding fluid overload given new reduction in EF  - Eventual Ischemic evaluation when medically stable  - Cardiology following    Pulm:  - Continue Decadron for enhanced anti-inflammatory effect  - Actively titrating Bipap settings to maintain SpO2 > 92%, will transition to HFNC as tolerated  - Incentive spirometry as able  - Albuterol PRN  - Tessalon Perles for cough  - Mucinex for congestion                  GI:  - NPO while Bipap    Renal:  - Even to net negative fluid balance as tolerated by hemodynamics and renal fx.    - Continue to monitor Bun/Cr and UOP  - Replacing electrolytes as needed with Goal K> 4, PO> 3, Mg> 2               - Strict I&O's  - Avoid Nephro toxic medication  - Renally dose meds    Heme:  - Heparin gtt for NSTEMI    ID:  - Jhony/Decdron for Covid, Trend renal function with Jhony use  - Cefepime for PNA, will add Azithro for Atypical coverage  - Microbiology and Radiology reviewed   - trend CBC with diff, CMP  and fever curve    Endo:  - ISS for aggressive glycemic control to limit FS glucose to < 180mg/dl.    COVID 19 specific considerations and therapeutic options based on the available and rapidly changing literature    Critical Care Time (EXCLUSIVE of any non bundled procedures) :  45 minutes were spent assessing the patient's presenting problems of acute illness that pose a high probability of life threatening  deterioration or end organ damage / dysfunction.  Medical desicion making includes initiation / continuation of plan or care review data/ labwork/ radiographic study, direct patient care bedside ,  discussions with  consultants regarding care,  evaluation and interpretation of vital signs, any necessary ventilator management,   NIV or BIPAP changes  or initiation,    discussions with multidisipliary team,  am or pm rounds, discussions of goals of care with patient and family all non-inclusive of procedures.    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, type 2 DM, right lung mass  admitted to the ICU For    # AHRF, Type 1  # Covid 19  # LLL PNA  # NSTEMI v Myocarditis 2/2 COVID  # Acute Systolic Heart Failure  # ALEXI    Neuro:  - Avoid Neuro Deliriogenic / sedative medications    CV:  - Maintain MAP > 65  - TTE showing EF of 45-50%, with apical and mid inferior hypokinesis  - ASA/Statin/BB,   - EKG STAT ordered showing LBBB, unsure if this in new, , Trend troponin till peak 4364 -> 4762  - Avoiding fluid overload given new reduction in EF  - Eventual Ischemic evaluation when medically stable  - Cardiology following    Pulm:  - Continue Decadron for enhanced anti-inflammatory effect  - Actively titrating Bipap settings to maintain SpO2 > 92%, will transition to HFNC as tolerated  - Incentive spirometry as able  - Albuterol PRN  - Tessalon Perles for cough  - Mucinex for congestion                  GI:  - NPO while Bipap    Renal:  - Even to net negative fluid balance as tolerated by hemodynamics and renal fx.    - Continue to monitor Bun/Cr and UOP  - Replacing electrolytes as needed with Goal K> 4, PO> 3, Mg> 2               - Strict I&O's  - Avoid Nephro toxic medication  - Renally dose meds    Heme:  - Heparin gtt for NSTEMI    ID:  - Jhony/Decdron for Covid, Trend renal function with Jhony use  - Cefepime for PNA, will add Doxy for Atypical coverage given prolonged QTC  - Microbiology and Radiology reviewed   - trend CBC with diff, CMP  and fever curve    Endo:  - ISS for aggressive glycemic control to limit FS glucose to < 180mg/dl.    COVID 19 specific considerations and therapeutic options based on the available and rapidly changing literature    Critical Care Time (EXCLUSIVE of any non bundled procedures) :  45 minutes were spent assessing the patient's presenting problems of acute illness that pose a high probability of life threatening  deterioration or end organ damage / dysfunction.  Medical desicion making includes initiation / continuation of plan or care review data/ labwork/ radiographic study, direct patient care bedside ,  discussions with  consultants regarding care,  evaluation and interpretation of vital signs, any necessary ventilator management,   NIV or BIPAP changes  or initiation,    discussions with multidisipliary team,  am or pm rounds, discussions of goals of care with patient and family all non-inclusive of procedures.    Date of entry of this note is equal to the date of services rendered

## 2023-11-29 NOTE — PROGRESS NOTE ADULT - SUBJECTIVE AND OBJECTIVE BOX
Patient is a 83y old  Male who presents with a chief complaint of SOB (29 Nov 2023 18:50)      BRIEF HOSPITAL COURSE: 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, type 2 DM, right lung mass who presented to ED today via EMS from home with history of progressive SOB associated with non-productive cough over the past several days. Found to be in AHRF 2/2 COVID and LLL PNA, NSTEMI with New Systolic HF      PAST MEDICAL & SURGICAL HISTORY:  HTN (hypertension)      Aortic stenosis      Type 2 diabetes mellitus      Lung mass      Dyslipidemia      History of transcatheter aortic valve replacement (TAVR)      History of inguinal hernia repair, bilateral      History of appendectomy        Review of Systems:  CONSTITUTIONAL: No fever, chills, or fatigue.  EYES: No eye pain, visual disturbances, or discharge.  ENMT:  No difficulty hearing, tinnitus, or vertigo. No sinus or throat pain.  NECK: No pain or stiffness.  RESPIRATORY: No shortness of breath, cough, or wheezing.  CARDIOVASCULAR: No chest pain, palpitations, dizziness, or leg swelling.  GASTROINTESTINAL: No abdominal or epigastric pain. No nausea, vomiting, diarrhea, or constipation. No hematemesis, melena, or hematochezia.  GENITOURINARY: No dysuria, increased frequency, hematuria, or incontinence.  NEUROLOGICAL: No headaches, memory loss, loss of strength, numbness, or tremors.  SKIN: No itching, burning, rashes, or lesions.  MUSCULOSKELETAL: No joint pain or swelling. No muscle, back, or extremity pain.  PSYCHIATRIC: No depression, anxiety, mood swings, or difficulty sleeping.    Medications:  cefepime   IVPB 2000 milliGRAM(s) IV Intermittent every 12 hours  remdesivir  IVPB 200 milliGRAM(s) IV Intermittent every 24 hours  remdesivir  IVPB   IV Intermittent     metoprolol tartrate 25 milliGRAM(s) Oral every 12 hours    albuterol/ipratropium for Nebulization 3 milliLiter(s) Nebulizer every 6 hours    acetaminophen     Tablet .. 650 milliGRAM(s) Oral every 6 hours PRN      heparin   Injectable 5000 Unit(s) IV Push every 6 hours PRN  heparin  Infusion.  Unit(s)/Hr IV Continuous <Continuous>    pantoprazole    Tablet 40 milliGRAM(s) Oral before breakfast      dexAMETHasone  Injectable 6 milliGRAM(s) IV Push daily  insulin lispro (ADMELOG) corrective regimen sliding scale   SubCutaneous every 6 hours  simvastatin 40 milliGRAM(s) Oral at bedtime    ascorbic acid 500 milliGRAM(s) Oral daily  multivitamin 1 Tablet(s) Oral daily      chlorhexidine 2% Cloths 1 Application(s) Topical <User Schedule>            ICU Vital Signs Last 24 Hrs  T(C): 36.6 (29 Nov 2023 13:40), Max: 36.6 (29 Nov 2023 13:40)  T(F): 97.9 (29 Nov 2023 13:40), Max: 97.9 (29 Nov 2023 13:40)  HR: 94 (29 Nov 2023 21:12) (89 - 94)  BP: 126/63 (29 Nov 2023 21:12) (119/72 - 126/63)  BP(mean): --  ABP: --  ABP(mean): --  RR: 18 (29 Nov 2023 21:12) (18 - 22)  SpO2: 95% (29 Nov 2023 21:12) (95% - 100%)    O2 Parameters below as of 29 Nov 2023 21:12  Patient On (Oxygen Delivery Method): BiPAP/CPAP                I&O's Detail      LABS:                        10.1   6.95  )-----------( 140      ( 29 Nov 2023 14:20 )             31.1     11-29    136  |  99  |  37<H>  ----------------------------<  317<H>  4.5   |  29  |  2.08<H>    Ca    9.7      29 Nov 2023 14:20  Mg     2.1     11-29    TPro  7.7  /  Alb  3.6  /  TBili  0.4  /  DBili  x   /  AST  47<H>  /  ALT  28  /  AlkPhos  55  11-29          CAPILLARY BLOOD GLUCOSE        PT/INR - ( 29 Nov 2023 14:20 )   PT: 16.4 sec;   INR: 1.42 ratio         PTT - ( 29 Nov 2023 14:20 )  PTT:35.3 sec  Urinalysis Basic - ( 29 Nov 2023 14:20 )    Color: x / Appearance: x / SG: x / pH: x  Gluc: 317 mg/dL / Ketone: x  / Bili: x / Urobili: x   Blood: x / Protein: x / Nitrite: x   Leuk Esterase: x / RBC: x / WBC x   Sq Epi: x / Non Sq Epi: x / Bacteria: x      CULTURES:  Rapid RVP Result: Detected (11-29 @ 14:20)      Physical Examination:  General: Well appearing, lying in bed in NAD.      HEENT: Pupils equal, reactive to light. Symmetric. No scleral icterus or injection.    PULM: Clear/diminshed to auscultation B/L. No wheezes, rales, or rhonchi apprecaited. No significant sputum production or increased respiratory effort.    NECK: Supple, no lymphadenopathy, trachea midline.    CVS: Regular rate and rhythm, no murmurs appreciated, +s1/s2.    ABD: Soft, nondistended, nontender, normoactive bowel sounds.    EXT: No edema, nontender.    SKIN: Warm and well perfused, no rashes noted.    NEURO: Alert, oriented, interactive, nonfocal.      RADIOLOGY: < from: Xray Chest 1 View- PORTABLE-Urgent (11.29.23 @ 14:13) >    ACC: [id number] EXAM:  XR CHEST PORTABLE URGENT 1V   ORDERED BY: ANGELIKA RAMOS     PROCEDURE DATE:  11/29/2023          INTERPRETATION:  An AP portable chest x-ray was performed for clinical   concern of pneumonia.    There are no prior studies for comparison.    There is an infiltrate in the left lower lobe, consistent with pneumonia.   The remainder the exam is notable for marginally prominent cardiac   silhouette with AV sequential pacemaker and central pulmonary venous   engorgement but without bharati pulmonary edema. There is no pneumothorax.   There is no pleural effusion. No other hilar or mediastinal abnormality   is seen. There are degenerative changes of the spine and shoulders.    IMPRESSION:  1. Left lower lobe infiltrate is suspicious for pneumonia.  2. The cardiac silhouette is marginally prominent with AV sequential   pacemaker and central pulmonary venous engorgement, but without bharati   pulmonary edema.    --- End of Report ---            TINY LU MD; Attending Radiologist  This document has been electronically signed. Nov 29 2023  2:30PM    < end of copied text >     Patient is a 83y old  Male who presents with a chief complaint of SOB (29 Nov 2023 18:50)      BRIEF HOSPITAL COURSE: 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, type 2 DM, right lung mass who presented to ED today via EMS from home with history of progressive SOB associated with non-productive cough over the past several days. Found to be in AHRF 2/2 COVID and LLL PNA, NSTEMI with New Systolic HF      PAST MEDICAL & SURGICAL HISTORY:  HTN (hypertension)      Aortic stenosis      Type 2 diabetes mellitus      Lung mass      Dyslipidemia      History of transcatheter aortic valve replacement (TAVR)      History of inguinal hernia repair, bilateral      History of appendectomy        Review of Systems:  CONSTITUTIONAL: No fever, chills, or fatigue.  EYES: No eye pain, visual disturbances, or discharge.  ENMT:  No difficulty hearing, tinnitus, or vertigo. No sinus or throat pain.  NECK: No pain or stiffness.  RESPIRATORY: No shortness of breath, cough, or wheezing.  CARDIOVASCULAR: No chest pain, palpitations, dizziness, or leg swelling.  GASTROINTESTINAL: No abdominal or epigastric pain. No nausea, vomiting, diarrhea, or constipation. No hematemesis, melena, or hematochezia.  GENITOURINARY: No dysuria, increased frequency, hematuria, or incontinence.  NEUROLOGICAL: No headaches, memory loss, loss of strength, numbness, or tremors.  SKIN: No itching, burning, rashes, or lesions.  MUSCULOSKELETAL: No joint pain or swelling. No muscle, back, or extremity pain.  PSYCHIATRIC: No depression, anxiety, mood swings, or difficulty sleeping.    Medications:  cefepime   IVPB 2000 milliGRAM(s) IV Intermittent every 12 hours  remdesivir  IVPB 200 milliGRAM(s) IV Intermittent every 24 hours  remdesivir  IVPB   IV Intermittent     metoprolol tartrate 25 milliGRAM(s) Oral every 12 hours    albuterol/ipratropium for Nebulization 3 milliLiter(s) Nebulizer every 6 hours    acetaminophen     Tablet .. 650 milliGRAM(s) Oral every 6 hours PRN      heparin   Injectable 5000 Unit(s) IV Push every 6 hours PRN  heparin  Infusion.  Unit(s)/Hr IV Continuous <Continuous>    pantoprazole    Tablet 40 milliGRAM(s) Oral before breakfast      dexAMETHasone  Injectable 6 milliGRAM(s) IV Push daily  insulin lispro (ADMELOG) corrective regimen sliding scale   SubCutaneous every 6 hours  simvastatin 40 milliGRAM(s) Oral at bedtime    ascorbic acid 500 milliGRAM(s) Oral daily  multivitamin 1 Tablet(s) Oral daily      chlorhexidine 2% Cloths 1 Application(s) Topical <User Schedule>            ICU Vital Signs Last 24 Hrs  T(C): 36.6 (29 Nov 2023 13:40), Max: 36.6 (29 Nov 2023 13:40)  T(F): 97.9 (29 Nov 2023 13:40), Max: 97.9 (29 Nov 2023 13:40)  HR: 94 (29 Nov 2023 21:12) (89 - 94)  BP: 126/63 (29 Nov 2023 21:12) (119/72 - 126/63)  BP(mean): --  ABP: --  ABP(mean): --  RR: 18 (29 Nov 2023 21:12) (18 - 22)  SpO2: 95% (29 Nov 2023 21:12) (95% - 100%)    O2 Parameters below as of 29 Nov 2023 21:12  Patient On (Oxygen Delivery Method): BiPAP/CPAP                I&O's Detail      LABS:                        10.1   6.95  )-----------( 140      ( 29 Nov 2023 14:20 )             31.1     11-29    136  |  99  |  37<H>  ----------------------------<  317<H>  4.5   |  29  |  2.08<H>    Ca    9.7      29 Nov 2023 14:20  Mg     2.1     11-29    TPro  7.7  /  Alb  3.6  /  TBili  0.4  /  DBili  x   /  AST  47<H>  /  ALT  28  /  AlkPhos  55  11-29          CAPILLARY BLOOD GLUCOSE        PT/INR - ( 29 Nov 2023 14:20 )   PT: 16.4 sec;   INR: 1.42 ratio         PTT - ( 29 Nov 2023 14:20 )  PTT:35.3 sec  Urinalysis Basic - ( 29 Nov 2023 14:20 )    Color: x / Appearance: x / SG: x / pH: x  Gluc: 317 mg/dL / Ketone: x  / Bili: x / Urobili: x   Blood: x / Protein: x / Nitrite: x   Leuk Esterase: x / RBC: x / WBC x   Sq Epi: x / Non Sq Epi: x / Bacteria: x      CULTURES:  Rapid RVP Result: Detected (11-29 @ 14:20)      Physical Examination:  General: Well appearing, lying in bed in NAD.      HEENT: Pupils equal, reactive to light. Symmetric. No scleral icterus or injection.    PULM: Clear/diminshed to auscultation B/L. No wheezes, rales, or rhonchi apprecaited. No significant sputum production or increased respiratory effort.    NECK: Supple, no lymphadenopathy, trachea midline.    CVS: Regular rate and rhythm, no murmurs appreciated, +s1/s2.    ABD: Soft, nondistended, nontender, normoactive bowel sounds.    EXT: No edema, nontender.    SKIN: Warm and well perfused, no rashes noted.    NEURO: Alert, oriented, interactive, nonfocal.      RADIOLOGY: < from: Xray Chest 1 View- PORTABLE-Urgent (11.29.23 @ 14:13) >    ACC: [id number] EXAM:  XR CHEST PORTABLE URGENT 1V   ORDERED BY: ANGELIKA RAMOS     PROCEDURE DATE:  11/29/2023          INTERPRETATION:  An AP portable chest x-ray was performed for clinical   concern of pneumonia.    There are no prior studies for comparison.    There is an infiltrate in the left lower lobe, consistent with pneumonia.   The remainder the exam is notable for marginally prominent cardiac   silhouette with AV sequential pacemaker and central pulmonary venous   engorgement but without bharati pulmonary edema. There is no pneumothorax.   There is no pleural effusion. No other hilar or mediastinal abnormality   is seen. There are degenerative changes of the spine and shoulders.    IMPRESSION:  1. Left lower lobe infiltrate is suspicious for pneumonia.  2. The cardiac silhouette is marginally prominent with AV sequential   pacemaker and central pulmonary venous engorgement, but without bharati   pulmonary edema.    --- End of Report ---            TINY LU MD; Attending Radiologist  This document has been electronically signed. Nov 29 2023  2:30PM    < end of copied text >     Patient is a 83y old  Male who presents with a chief complaint of SOB (29 Nov 2023 18:50)      BRIEF HOSPITAL COURSE: 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, type 2 DM, right lung mass who presented to ED today via EMS from home with history of progressive SOB associated with non-productive cough over the past several days. Found to be in AHRF 2/2 COVID and LLL PNA, NSTEMI with New Systolic HF      PAST MEDICAL & SURGICAL HISTORY:  HTN (hypertension)      Aortic stenosis      Type 2 diabetes mellitus      Lung mass      Dyslipidemia      History of transcatheter aortic valve replacement (TAVR)      History of inguinal hernia repair, bilateral      History of appendectomy        Review of Systems:  CONSTITUTIONAL: No fever, chills, or fatigue.  EYES: No eye pain, visual disturbances, or discharge.  ENMT:  No difficulty hearing, tinnitus, or vertigo. No sinus or throat pain.  NECK: No pain or stiffness.  RESPIRATORY: No shortness of breath, cough, or wheezing.  CARDIOVASCULAR: No chest pain, palpitations, dizziness, or leg swelling.  GASTROINTESTINAL: No abdominal or epigastric pain. No nausea, vomiting, diarrhea, or constipation. No hematemesis, melena, or hematochezia.  GENITOURINARY: No dysuria, increased frequency, hematuria, or incontinence.  NEUROLOGICAL: No headaches, memory loss, loss of strength, numbness, or tremors.  SKIN: No itching, burning, rashes, or lesions.  MUSCULOSKELETAL: No joint pain or swelling. No muscle, back, or extremity pain.  PSYCHIATRIC: No depression, anxiety, mood swings, or difficulty sleeping.    Medications:  cefepime   IVPB 2000 milliGRAM(s) IV Intermittent every 12 hours  remdesivir  IVPB 200 milliGRAM(s) IV Intermittent every 24 hours  remdesivir  IVPB   IV Intermittent     metoprolol tartrate 25 milliGRAM(s) Oral every 12 hours    albuterol/ipratropium for Nebulization 3 milliLiter(s) Nebulizer every 6 hours    acetaminophen     Tablet .. 650 milliGRAM(s) Oral every 6 hours PRN      heparin   Injectable 5000 Unit(s) IV Push every 6 hours PRN  heparin  Infusion.  Unit(s)/Hr IV Continuous <Continuous>    pantoprazole    Tablet 40 milliGRAM(s) Oral before breakfast      dexAMETHasone  Injectable 6 milliGRAM(s) IV Push daily  insulin lispro (ADMELOG) corrective regimen sliding scale   SubCutaneous every 6 hours  simvastatin 40 milliGRAM(s) Oral at bedtime    ascorbic acid 500 milliGRAM(s) Oral daily  multivitamin 1 Tablet(s) Oral daily      chlorhexidine 2% Cloths 1 Application(s) Topical <User Schedule>            ICU Vital Signs Last 24 Hrs  T(C): 36.6 (29 Nov 2023 13:40), Max: 36.6 (29 Nov 2023 13:40)  T(F): 97.9 (29 Nov 2023 13:40), Max: 97.9 (29 Nov 2023 13:40)  HR: 94 (29 Nov 2023 21:12) (89 - 94)  BP: 126/63 (29 Nov 2023 21:12) (119/72 - 126/63)  BP(mean): --  ABP: --  ABP(mean): --  RR: 18 (29 Nov 2023 21:12) (18 - 22)  SpO2: 95% (29 Nov 2023 21:12) (95% - 100%)    O2 Parameters below as of 29 Nov 2023 21:12  Patient On (Oxygen Delivery Method): BiPAP/CPAP                I&O's Detail      LABS:                        10.1   6.95  )-----------( 140      ( 29 Nov 2023 14:20 )             31.1     11-29    136  |  99  |  37<H>  ----------------------------<  317<H>  4.5   |  29  |  2.08<H>    Ca    9.7      29 Nov 2023 14:20  Mg     2.1     11-29    TPro  7.7  /  Alb  3.6  /  TBili  0.4  /  DBili  x   /  AST  47<H>  /  ALT  28  /  AlkPhos  55  11-29          CAPILLARY BLOOD GLUCOSE        PT/INR - ( 29 Nov 2023 14:20 )   PT: 16.4 sec;   INR: 1.42 ratio         PTT - ( 29 Nov 2023 14:20 )  PTT:35.3 sec  Urinalysis Basic - ( 29 Nov 2023 14:20 )    Color: x / Appearance: x / SG: x / pH: x  Gluc: 317 mg/dL / Ketone: x  / Bili: x / Urobili: x   Blood: x / Protein: x / Nitrite: x   Leuk Esterase: x / RBC: x / WBC x   Sq Epi: x / Non Sq Epi: x / Bacteria: x      CULTURES:  Rapid RVP Result: Detected (11-29 @ 14:20)      Physical Examination:  General: Well appearing, lying in bed in NAD.      HEENT: Pupils equal, reactive to light. Symmetric. No scleral icterus or injection.    PULM: Clear/diminshed to auscultation B/L. No wheezes, rales, or rhonchi apprecaited. No significant sputum production or increased respiratory effort.    NECK: Supple, no lymphadenopathy, trachea midline.    CVS: Regular rate and rhythm, no murmurs appreciated, +s1/s2.    ABD: Soft, nondistended, nontender, normoactive bowel sounds.    EXT: No edema, nontender.    SKIN: Warm and well perfused, no rashes noted.    NEURO: Alert, oriented, interactive, nonfocal.      RADIOLOGY: < from: Xray Chest 1 View- PORTABLE-Urgent (11.29.23 @ 14:13) >    ACC: [id number] EXAM:  XR CHEST PORTABLE URGENT 1V   ORDERED BY: ANGELIKA RAMOS     PROCEDURE DATE:  11/29/2023          INTERPRETATION:  An AP portable chest x-ray was performed for clinical   concern of pneumonia.    There are no prior studies for comparison.    There is an infiltrate in the left lower lobe, consistent with pneumonia.   The remainder the exam is notable for marginally prominent cardiac   silhouette with AV sequential pacemaker and central pulmonary venous   engorgement but without bharati pulmonary edema. There is no pneumothorax.   There is no pleural effusion. No other hilar or mediastinal abnormality   is seen. There are degenerative changes of the spine and shoulders.    IMPRESSION:  1. Left lower lobe infiltrate is suspicious for pneumonia.  2. The cardiac silhouette is marginally prominent with AV sequential   pacemaker and central pulmonary venous engorgement, but without bharati   pulmonary edema.    --- End of Report ---            TINY LU MD; Attending Radiologist  This document has been electronically signed. Nov 29 2023  2:30PM    < end of copied text >

## 2023-11-29 NOTE — H&P ADULT - PROBLEM SELECTOR PLAN 7
On theraputic heparin, SCD's for DVT  PPI daily for GI  Bedrest, NPO x meds and ice chips on BiPAP  Full code status  Palliative consult to establish HCP and possible MOLST

## 2023-11-29 NOTE — PATIENT PROFILE ADULT - FALL HARM RISK - HARM RISK INTERVENTIONS
Assistance with ambulation/Communicate Risk of Fall with Harm to all staff/Reinforce activity limits and safety measures with patient and family/Tailored Fall Risk Interventions/Visual Cue: Yellow wristband and red socks/Bed in lowest position, wheels locked, appropriate side rails in place/Call bell, personal items and telephone in reach/Instruct patient to call for assistance before getting out of bed or chair/Non-slip footwear when patient is out of bed/[place] to call system/Physically safe environment - no spills, clutter or unnecessary equipment/Purposeful Proactive Rounding/Room/bathroom lighting operational, light cord in reach Assistance with ambulation/Communicate Risk of Fall with Harm to all staff/Reinforce activity limits and safety measures with patient and family/Tailored Fall Risk Interventions/Visual Cue: Yellow wristband and red socks/Bed in lowest position, wheels locked, appropriate side rails in place/Call bell, personal items and telephone in reach/Instruct patient to call for assistance before getting out of bed or chair/Non-slip footwear when patient is out of bed/[place] to call system/Physically safe environment - no spills, clutter or unnecessary equipment/Purposeful Proactive Rounding/Room/bathroom lighting operational, light cord in reach Assistance with ambulation/Communicate Risk of Fall with Harm to all staff/Reinforce activity limits and safety measures with patient and family/Tailored Fall Risk Interventions/Visual Cue: Yellow wristband and red socks/Bed in lowest position, wheels locked, appropriate side rails in place/Call bell, personal items and telephone in reach/Instruct patient to call for assistance before getting out of bed or chair/Non-slip footwear when patient is out of bed/[place] to call system/Physically safe environment - no spills, clutter or unnecessary equipment/Purposeful Proactive Rounding/Room/bathroom lighting operational, light cord in reach

## 2023-11-29 NOTE — H&P ADULT - NSHPREVIEWOFSYSTEMS_GEN_ALL_CORE
CONSTITUTIONAL: Denies fever, weight loss, or fatigue  ENT: Denies dysphagia, difficulty chewing, tinnitus, blurred vision, double vision  RESPIRATORY: See HPI  CARDIOVASCULAR: See HPI  GASTROINTESTINAL:  Denies nausea, vomiting, abdominal pain, diarrhea, constipation, melena, BRBPR, food intolerances  GENITOURINARY: Denies dysuria, hematuria, urinary frequency, urinary incontinence  NEUROLOGICAL: Denies headaches, syncope, near syncope, dizziness, lightheadedness, headaches, seizures  SKIN: Denies rashes, masses, bruising, lesions   MUSCULOSKELETAL: Denies history of OA or gout, joint swelling  PSYCHIATRIC: Denies depression, anxiety, mood swings, or difficulty sleeping  HEME: Denies history of DVT/PE, blood transfusion

## 2023-11-29 NOTE — H&P ADULT - HISTORY OF PRESENT ILLNESS
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, type 2 DM, right lung mass who presented to ED today via EMS from home with history of progressive SOB associated with non-productive cough over the past several days. Denies chest pain, fevers, chills, nausea, abdominal pain, lightheadedness, dizziness, palpitations, irregular HR, sick contacts

## 2023-11-29 NOTE — H&P ADULT - PROBLEM SELECTOR PLAN 1
likely multifactorial from COVID, CAP, and acute decompensated systolic heart failure in the setting of NSTEMI.  Cardiology recommendations appreciated, start heparin IV per nomogram with ASA, statin and beta blocker.  Hold ACE/ARB with elevated creatinine.  Trend EKG and troponin.  EF reduced from last year, strict intake and outputs, trend BNP. wean from supplemental oxygen as tolerated.  Start COVID treatment per current recommendations with remdesivir and high dose steroids, monitor liver function. likely multifactorial from COVID, CAP, and acute decompensated systolic heart failure in the setting of NSTEMI.        Cardiology recommendations appreciated, start heparin IV per nomogram with ASA, statin and beta blocker.  Hold ACE/ARB with elevated creatinine.  Trend EKG and troponin.  EF reduced from last year, strict intake and outputs, trend BNP. wean from supplemental oxygen as tolerated.  Start COVID treatment per current recommendations with remdesivir and high dose steroids, monitor liver function.

## 2023-11-29 NOTE — ED PROVIDER NOTE - OBJECTIVE STATEMENT
83M PMHX TAVR, on Eliquis, HTN, HLD presenting with shortness of breath, x 1 day. Describes mild - mod shortness of breath, dyspnea on exertion, no chest pain. A/w coughing, nonproductive.     ROS: Otherwise, (-) tearing or ripping quality, (-) diaphoresis,   (-) pleuritic component, (-) positional component, (-) abdominal pain, (-) nausea/vomiting, (-) fevers/chills, (-) recent illness, (-) traumatic injury,  , (-) coughing,   (-) tobacco, (-) IVDU or cocaine use.

## 2023-11-29 NOTE — ED PROVIDER NOTE - CARE PLAN
Principal Discharge DX:	Acute on chronic systolic congestive heart failure  Secondary Diagnosis:	Non-ST elevation MI (NSTEMI)   1

## 2023-11-29 NOTE — H&P ADULT - NSHPPHYSICALEXAM_GEN_ALL_CORE
Vital Signs Last 24 Hrs  T(C): 36.6 (29 Nov 2023 13:40), Max: 36.6 (29 Nov 2023 13:40)  T(F): 97.9 (29 Nov 2023 13:40), Max: 97.9 (29 Nov 2023 13:40)  HR: 94 (29 Nov 2023 15:28) (89 - 94)  BP: 119/72 (29 Nov 2023 13:40) (119/72 - 119/72)  RR: 22 (29 Nov 2023 13:40) (22 - 22)  SpO2: 98% (29 Nov 2023 15:28) (98% - 100%)  BiPAP 12/5 50%    Physical Exam  Gen:  WN/WD Male resting in bed on NIV, NAD  ENT:  NC/AT, no JVD noted  Thorax:  Symmetric, no retractions  Lung:  Diminished throughout  CV:  S1, S2. RRR  Abd:  Soft, NT/ND.  BS normoactive, no masses to palp  Extrem:  Warm, pink.  +1 edema in LE's bilaterally  Neuro:  No gross motor/sensory deficits  Psych:  Awake, alert and calm

## 2023-11-29 NOTE — ED ADULT TRIAGE NOTE - RESPIRATORY RATE (BREATHS/MIN)
[Today's Date] : [unfilled] [Name] : Name: [unfilled] [] : : ~~ [Today's Date:] : [unfilled] [Dear  ___:] : Dear Dr. [unfilled]: [Consult] : I had the pleasure of evaluating your patient, [unfilled]. My full evaluation follows. [Consult - Single Provider] : Thank you very much for allowing me to participate in the care of this patient. If you have any questions, please do not hesitate to contact me. [Sincerely,] : Sincerely, [DrAnselmo  ___] : Dr. BATRES [FreeTextEntry4] : Rashmi Anaya MD [FreeTextEntry5] : [address] [facility] [FreeTextEntry6] : [place]  [de-identified] : Brianda Viveros MD FAC SHANTE\par Attending Physician, Pediatric Cardiology\par Director, Fetal Cardiology\par [facility]\par  of Pediatrics\par [employer]par [employer] [facility]  [DrAnselmo ___] : Dr. BATRES [___] : [unfilled] 22

## 2023-11-29 NOTE — CONSULT NOTE ADULT - SUBJECTIVE AND OBJECTIVE BOX
LUZ MARIA EVANS  [id number]      HPI:        ALLERGIES:  No Known Allergies      PAST MEDICAL & SURGICAL HISTORY:        CURRENT MEDICATIONS:      SOCIAL HISTORY:      FAMILY HISTORY:      ROS:  All 10 systems reviewed and positives noted in HPI    OBJECTIVE:    VITAL SIGNS:  Vital Signs Last 24 Hrs  T(C): 36.6 (29 Nov 2023 13:40), Max: 36.6 (29 Nov 2023 13:40)  T(F): 97.9 (29 Nov 2023 13:40), Max: 97.9 (29 Nov 2023 13:40)  HR: 94 (29 Nov 2023 15:28) (89 - 94)  BP: 119/72 (29 Nov 2023 13:40) (119/72 - 119/72)  BP(mean): --  RR: 22 (29 Nov 2023 13:40) (22 - 22)  SpO2: 98% (29 Nov 2023 15:28) (98% - 100%)    Parameters below as of 29 Nov 2023 13:40  Patient On (Oxygen Delivery Method): mask, nonrebreather  O2 Flow (L/min): 15      PHYSICAL EXAM:  General: well appearing, no distress  HEENT: sclera anicteric  Neck: supple, no carotid bruits b/l  CVS: JVP ~ 7 cm H20, RRR, s1, s2, no murmurs/rubs/gallops  Chest: unlabored respirations, clear to auscultation b/l  Abdomen: non-distended  Extremities: no lower extremity edema b/l  Neuro: awake, alert & oriented x 3  Psych: normal affect      LABS:                        10.1   6.95  )-----------( 140      ( 29 Nov 2023 14:20 )             31.1     11-29    136  |  99  |  37<H>  ----------------------------<  317<H>  4.5   |  29  |  2.08<H>    Ca    9.7      29 Nov 2023 14:20  Mg     2.1     11-29    TPro  7.7  /  Alb  3.6  /  TBili  0.4  /  DBili  x   /  AST  47<H>  /  ALT  28  /  AlkPhos  55  11-29        PT/INR - ( 29 Nov 2023 14:20 )   PT: 16.4 sec;   INR: 1.42 ratio         PTT - ( 29 Nov 2023 14:20 )  PTT:35.3 sec      TTE (1/2022):  LVEF 60-65%  TAVR    ECG (11/29/23): atrial sensed, ventricular paed   LUZ MARIA EVANS  [id number]      HPI:        ALLERGIES:  No Known Allergies      PAST MEDICAL & SURGICAL HISTORY:        CURRENT MEDICATIONS:      SOCIAL HISTORY:      FAMILY HISTORY:      ROS:  All 10 systems reviewed and positives noted in HPI    OBJECTIVE:    VITAL SIGNS:  Vital Signs Last 24 Hrs  T(C): 36.6 (29 Nov 2023 13:40), Max: 36.6 (29 Nov 2023 13:40)  T(F): 97.9 (29 Nov 2023 13:40), Max: 97.9 (29 Nov 2023 13:40)  HR: 94 (29 Nov 2023 15:28) (89 - 94)  BP: 119/72 (29 Nov 2023 13:40) (119/72 - 119/72)  BP(mean): --  RR: 22 (29 Nov 2023 13:40) (22 - 22)  SpO2: 98% (29 Nov 2023 15:28) (98% - 100%)    Parameters below as of 29 Nov 2023 13:40  Patient On (Oxygen Delivery Method): mask, nonrebreather  O2 Flow (L/min): 15      PHYSICAL EXAM:  General: well appearing, no distress  HEENT: sclera anicteric  Neck: supple, no carotid bruits b/l  CVS: JVP ~ 7 cm H20, RRR, s1, s2, no murmurs/rubs/gallops  Chest: unlabored respirations, clear to auscultation b/l  Abdomen: non-distended  Extremities: no lower extremity edema b/l  Neuro: awake, alert & oriented x 3  Psych: normal affect      LABS:                        10.1   6.95  )-----------( 140      ( 29 Nov 2023 14:20 )             31.1     11-29    136  |  99  |  37<H>  ----------------------------<  317<H>  4.5   |  29  |  2.08<H>    Ca    9.7      29 Nov 2023 14:20  Mg     2.1     11-29    TPro  7.7  /  Alb  3.6  /  TBili  0.4  /  DBili  x   /  AST  47<H>  /  ALT  28  /  AlkPhos  55  11-29        PT/INR - ( 29 Nov 2023 14:20 )   PT: 16.4 sec;   INR: 1.42 ratio         PTT - ( 29 Nov 2023 14:20 )  PTT:35.3 sec      TTE (1/2022):  LVEF 60-65%  TAVR    ECG (11/29/23): atrial sensed, ventricular paed   LUZ MARIA EVANS  [id number]      HPI:        ALLERGIES:  No Known Allergies      PAST MEDICAL & SURGICAL HISTORY:        CURRENT MEDICATIONS:      SOCIAL HISTORY:      FAMILY HISTORY:      ROS:  All 10 systems reviewed and positives noted in HPI    OBJECTIVE:    VITAL SIGNS:  Vital Signs Last 24 Hrs  T(C): 36.6 (29 Nov 2023 13:40), Max: 36.6 (29 Nov 2023 13:40)  T(F): 97.9 (29 Nov 2023 13:40), Max: 97.9 (29 Nov 2023 13:40)  HR: 94 (29 Nov 2023 15:28) (89 - 94)  BP: 119/72 (29 Nov 2023 13:40) (119/72 - 119/72)  BP(mean): --  RR: 22 (29 Nov 2023 13:40) (22 - 22)  SpO2: 98% (29 Nov 2023 15:28) (98% - 100%)    Parameters below as of 29 Nov 2023 13:40  Patient On (Oxygen Delivery Method): mask, nonrebreather  O2 Flow (L/min): 15      PHYSICAL EXAM:  General: well appearing, no distress  HEENT: sclera anicteric  Neck: supple, no carotid bruits b/l  CVS: JVP ~ 7 cm H20, RRR, s1, s2, no murmurs/rubs/gallops  Chest: unlabored respirations, clear to auscultation b/l  Abdomen: non-distended  Extremities: no lower extremity edema b/l  Neuro: awake, alert & oriented x 3  Psych: normal affect      LABS:                        10.1   6.95  )-----------( 140      ( 29 Nov 2023 14:20 )             31.1     11-29    136  |  99  |  37<H>  ----------------------------<  317<H>  4.5   |  29  |  2.08<H>    Ca    9.7      29 Nov 2023 14:20  Mg     2.1     11-29    TPro  7.7  /  Alb  3.6  /  TBili  0.4  /  DBili  x   /  AST  47<H>  /  ALT  28  /  AlkPhos  55  11-29        PT/INR - ( 29 Nov 2023 14:20 )   PT: 16.4 sec;   INR: 1.42 ratio         PTT - ( 29 Nov 2023 14:20 )  PTT:35.3 sec      TTE (1/2022):  LVEF 60-65%  TAVR    ECG (11/29/23): atrial sensed, ventricular paed   LUZ MARIA EVANS  [id number]      HPI:    Luz Maria Evans is an 83 year old man with past medical history of TAVR and Hypertension who was brought in by EMS due to progressive cough and shortness of breath.    The patient's family is present at bedside to provide additional history as patient is on BiPAP. The patient denies chest pain. The family reports he has had increasing cough and shortness of breath for the past few days. The patient follows with cardiologist, Dr. Haley at [facility].     ALLERGIES:  No Known Allergies      PAST MEDICAL & SURGICAL HISTORY:  Hypertension  TAVR    ROS:  All 10 systems reviewed and positives noted in HPI    OBJECTIVE:    VITAL SIGNS:  Vital Signs Last 24 Hrs  T(C): 36.6 (29 Nov 2023 13:40), Max: 36.6 (29 Nov 2023 13:40)  T(F): 97.9 (29 Nov 2023 13:40), Max: 97.9 (29 Nov 2023 13:40)  HR: 94 (29 Nov 2023 15:28) (89 - 94)  BP: 119/72 (29 Nov 2023 13:40) (119/72 - 119/72)  BP(mean): --  RR: 22 (29 Nov 2023 13:40) (22 - 22)  SpO2: 98% (29 Nov 2023 15:28) (98% - 100%)    Parameters below as of 29 Nov 2023 13:40  Patient On (Oxygen Delivery Method): mask, nonrebreather  O2 Flow (L/min): 15    PHYSICAL EXAM:  General: on BiPAP  HEENT: sclera anicteric  Neck: supple  CVS: JVP ~ 7 cm H20, RRR, s1, s2, no murmurs  Chest: labored respirations, decreased breath sounds  Abdomen: distended  Extremities: mild lower extremity edema b/l  Neuro: awake, alert & oriented  Psych: normal affect      LABS:                        10.1   6.95  )-----------( 140      ( 29 Nov 2023 14:20 )             31.1     11-29    136  |  99  |  37<H>  ----------------------------<  317<H>  4.5   |  29  |  2.08<H>    Ca    9.7      29 Nov 2023 14:20  Mg     2.1     11-29    TPro  7.7  /  Alb  3.6  /  TBili  0.4  /  DBili  x   /  AST  47<H>  /  ALT  28  /  AlkPhos  55  11-29        PT/INR - ( 29 Nov 2023 14:20 )   PT: 16.4 sec;   INR: 1.42 ratio         PTT - ( 29 Nov 2023 14:20 )  PTT:35.3 sec      TTE (1/2022):  LVEF 60-65%  TAVR    ECG (11/29/23): atrial sensed, ventricular paed   LUZ MARIA EVANS  [id number]      HPI:    Luz Maria Evans is an 83 year old man with past medical history of TAVR and Hypertension who was brought in by EMS due to progressive cough and shortness of breath.    The patient's family is present at bedside to provide additional history as patient is on BiPAP. The patient denies chest pain. The family reports he has had increasing cough and shortness of breath for the past few days. The patient follows with cardiologist, Dr. Haley at [facility].     ALLERGIES:  No Known Allergies      PAST MEDICAL & SURGICAL HISTORY:  Hypertension  TAVR    ROS:  All 10 systems reviewed and positives noted in HPI    OBJECTIVE:    VITAL SIGNS:  Vital Signs Last 24 Hrs  T(C): 36.6 (29 Nov 2023 13:40), Max: 36.6 (29 Nov 2023 13:40)  T(F): 97.9 (29 Nov 2023 13:40), Max: 97.9 (29 Nov 2023 13:40)  HR: 94 (29 Nov 2023 15:28) (89 - 94)  BP: 119/72 (29 Nov 2023 13:40) (119/72 - 119/72)  BP(mean): --  RR: 22 (29 Nov 2023 13:40) (22 - 22)  SpO2: 98% (29 Nov 2023 15:28) (98% - 100%)    Parameters below as of 29 Nov 2023 13:40  Patient On (Oxygen Delivery Method): mask, nonrebreather  O2 Flow (L/min): 15    PHYSICAL EXAM:  General: on BiPAP  HEENT: sclera anicteric  Neck: supple  CVS: JVP ~ 7 cm H20, RRR, s1, s2, no murmurs  Chest: labored respirations, decreased breath sounds  Abdomen: distended  Extremities: mild lower extremity edema b/l  Neuro: awake, alert & oriented  Psych: normal affect      LABS:                        10.1   6.95  )-----------( 140      ( 29 Nov 2023 14:20 )             31.1     11-29    136  |  99  |  37<H>  ----------------------------<  317<H>  4.5   |  29  |  2.08<H>    Ca    9.7      29 Nov 2023 14:20  Mg     2.1     11-29    TPro  7.7  /  Alb  3.6  /  TBili  0.4  /  DBili  x   /  AST  47<H>  /  ALT  28  /  AlkPhos  55  11-29        PT/INR - ( 29 Nov 2023 14:20 )   PT: 16.4 sec;   INR: 1.42 ratio         PTT - ( 29 Nov 2023 14:20 )  PTT:35.3 sec      TTE (1/2022):  LVEF 60-65%  TAVR    ECG (11/29/23): atrial sensed, ventricular paed   LUZ MARIA EVANS  [id number]      HPI:    Luz Maria Evans is an 83 year old man with past medical history of TAVR and Hypertension who was brought in by EMS due to progressive cough and shortness of breath.    The patient's family is present at bedside to provide additional history as patient is on BiPAP. The patient denies chest pain. The family reports he has had increasing cough and shortness of breath for the past few days. The patient follows with cardiologist, Dr. Haley at [facility].     ALLERGIES:  No Known Allergies      PAST MEDICAL & SURGICAL HISTORY:  Hypertension  TAVR    ROS:  All 10 systems reviewed and positives noted in HPI    OBJECTIVE:    VITAL SIGNS:  Vital Signs Last 24 Hrs  T(C): 36.6 (29 Nov 2023 13:40), Max: 36.6 (29 Nov 2023 13:40)  T(F): 97.9 (29 Nov 2023 13:40), Max: 97.9 (29 Nov 2023 13:40)  HR: 94 (29 Nov 2023 15:28) (89 - 94)  BP: 119/72 (29 Nov 2023 13:40) (119/72 - 119/72)  BP(mean): --  RR: 22 (29 Nov 2023 13:40) (22 - 22)  SpO2: 98% (29 Nov 2023 15:28) (98% - 100%)    Parameters below as of 29 Nov 2023 13:40  Patient On (Oxygen Delivery Method): mask, nonrebreather  O2 Flow (L/min): 15    PHYSICAL EXAM:  General: on BiPAP  HEENT: sclera anicteric  Neck: supple  CVS: JVP ~ 7 cm H20, RRR, s1, s2, no murmurs  Chest: labored respirations, decreased breath sounds  Abdomen: distended  Extremities: mild lower extremity edema b/l  Neuro: awake, alert & oriented  Psych: normal affect      LABS:                        10.1   6.95  )-----------( 140      ( 29 Nov 2023 14:20 )             31.1     11-29    136  |  99  |  37<H>  ----------------------------<  317<H>  4.5   |  29  |  2.08<H>    Ca    9.7      29 Nov 2023 14:20  Mg     2.1     11-29    TPro  7.7  /  Alb  3.6  /  TBili  0.4  /  DBili  x   /  AST  47<H>  /  ALT  28  /  AlkPhos  55  11-29        PT/INR - ( 29 Nov 2023 14:20 )   PT: 16.4 sec;   INR: 1.42 ratio         PTT - ( 29 Nov 2023 14:20 )  PTT:35.3 sec      TTE (1/2022):  LVEF 60-65%  TAVR    ECG (11/29/23): atrial sensed, ventricular paed   LUZ MARIA EVANS  [id number]      HPI:    Luz Maria Evasn is an 83 year old man with past medical history of TAVR and Hypertension who was brought in by EMS due to progressive cough and shortness of breath.    The patient's family is present at bedside to provide additional history as patient is on BiPAP. The patient denies chest pain. The family reports he has had increasing cough and shortness of breath for the past few days. The patient follows with cardiologist, Dr. Haley at [facility].     ALLERGIES:  No Known Allergies      PAST MEDICAL & SURGICAL HISTORY:  Hypertension  TAVR    ROS:  All 10 systems reviewed and positives noted in HPI    OBJECTIVE:    VITAL SIGNS:  Vital Signs Last 24 Hrs  T(C): 36.6 (29 Nov 2023 13:40), Max: 36.6 (29 Nov 2023 13:40)  T(F): 97.9 (29 Nov 2023 13:40), Max: 97.9 (29 Nov 2023 13:40)  HR: 94 (29 Nov 2023 15:28) (89 - 94)  BP: 119/72 (29 Nov 2023 13:40) (119/72 - 119/72)  BP(mean): --  RR: 22 (29 Nov 2023 13:40) (22 - 22)  SpO2: 98% (29 Nov 2023 15:28) (98% - 100%)    Parameters below as of 29 Nov 2023 13:40  Patient On (Oxygen Delivery Method): mask, nonrebreather  O2 Flow (L/min): 15    PHYSICAL EXAM:  General: on BiPAP  HEENT: sclera anicteric  Neck: supple  CVS: JVP ~ 7 cm H20, RRR, s1, s2, no murmurs  Chest: labored respirations, decreased breath sounds  Abdomen: distended  Extremities: mild lower extremity edema b/l  Neuro: awake, alert & oriented  Psych: normal affect      LABS:                        10.1   6.95  )-----------( 140      ( 29 Nov 2023 14:20 )             31.1     11-29    136  |  99  |  37<H>  ----------------------------<  317<H>  4.5   |  29  |  2.08<H>    Ca    9.7      29 Nov 2023 14:20  Mg     2.1     11-29    TPro  7.7  /  Alb  3.6  /  TBili  0.4  /  DBili  x   /  AST  47<H>  /  ALT  28  /  AlkPhos  55  11-29        PT/INR - ( 29 Nov 2023 14:20 )   PT: 16.4 sec;   INR: 1.42 ratio         PTT - ( 29 Nov 2023 14:20 )  PTT:35.3 sec      TTE (1/2022):  LVEF 60-65%  TAVR    TTE (11/29/23):   1. Technically difficult study with poor endocardial visualization.   2. Mildly decreased global left ventricular systolic function.   3. Left ventricular ejection fraction, by visual estimation, is 45 to 50%.   4. Mildly increased LV wall thickness.   5. Normal left ventricular internal cavity size.   6. The left ventricle endocardium is not well visualized, consider use   of IV ultrasonic enhancing agent to better evaluate regional wall motion. Abnormal septal motion which may be due to paced rhythm. The mid inferior and apical inferior walls appear hypokinetic.   7. The right ventricle is not well visualized, appears to have normal   systolic function.   8. The left atrium is not well visualized, appears generally normal in   size.   9. The right atrium is not well visualized, appears generally normal in   size.  10. Mild mitral annular calcification.  11. Mild thickening and calcification of the anterior mitral valve   leaflet.  12. Mild mitral valve regurgitation.  13. There is a bioprosthetic valve in the aortic position, appears well   seated with peak velocity within normal limits.  14. There is no evidence of pericardial effusion.    ECG (11/29/23): atrial sensed, ventricular paed   LUZ MARIA EVANS  [id number]      HPI:    Luz Maria Evans is an 83 year old man with past medical history of TAVR and Hypertension who was brought in by EMS due to progressive cough and shortness of breath.    The patient's family is present at bedside to provide additional history as patient is on BiPAP. The patient denies chest pain. The family reports he has had increasing cough and shortness of breath for the past few days. The patient follows with cardiologist, Dr. Haley at [facility].     ALLERGIES:  No Known Allergies      PAST MEDICAL & SURGICAL HISTORY:  Hypertension  TAVR    ROS:  All 10 systems reviewed and positives noted in HPI    OBJECTIVE:    VITAL SIGNS:  Vital Signs Last 24 Hrs  T(C): 36.6 (29 Nov 2023 13:40), Max: 36.6 (29 Nov 2023 13:40)  T(F): 97.9 (29 Nov 2023 13:40), Max: 97.9 (29 Nov 2023 13:40)  HR: 94 (29 Nov 2023 15:28) (89 - 94)  BP: 119/72 (29 Nov 2023 13:40) (119/72 - 119/72)  BP(mean): --  RR: 22 (29 Nov 2023 13:40) (22 - 22)  SpO2: 98% (29 Nov 2023 15:28) (98% - 100%)    Parameters below as of 29 Nov 2023 13:40  Patient On (Oxygen Delivery Method): mask, nonrebreather  O2 Flow (L/min): 15    PHYSICAL EXAM:  General: on BiPAP  HEENT: sclera anicteric  Neck: supple  CVS: JVP ~ 7 cm H20, RRR, s1, s2, no murmurs  Chest: labored respirations, decreased breath sounds  Abdomen: distended  Extremities: mild lower extremity edema b/l  Neuro: awake, alert & oriented  Psych: normal affect      LABS:                        10.1   6.95  )-----------( 140      ( 29 Nov 2023 14:20 )             31.1     11-29    136  |  99  |  37<H>  ----------------------------<  317<H>  4.5   |  29  |  2.08<H>    Ca    9.7      29 Nov 2023 14:20  Mg     2.1     11-29    TPro  7.7  /  Alb  3.6  /  TBili  0.4  /  DBili  x   /  AST  47<H>  /  ALT  28  /  AlkPhos  55  11-29        PT/INR - ( 29 Nov 2023 14:20 )   PT: 16.4 sec;   INR: 1.42 ratio         PTT - ( 29 Nov 2023 14:20 )  PTT:35.3 sec      TTE (1/2022):  LVEF 60-65%  TAVR    TTE (11/29/23):   1. Technically difficult study with poor endocardial visualization.   2. Mildly decreased global left ventricular systolic function.   3. Left ventricular ejection fraction, by visual estimation, is 45 to 50%.   4. Mildly increased LV wall thickness.   5. Normal left ventricular internal cavity size.   6. The left ventricle endocardium is not well visualized, consider use   of IV ultrasonic enhancing agent to better evaluate regional wall motion. Abnormal septal motion which may be due to paced rhythm. The mid inferior and apical inferior walls appear hypokinetic.   7. The right ventricle is not well visualized, appears to have normal   systolic function.   8. The left atrium is not well visualized, appears generally normal in   size.   9. The right atrium is not well visualized, appears generally normal in   size.  10. Mild mitral annular calcification.  11. Mild thickening and calcification of the anterior mitral valve   leaflet.  12. Mild mitral valve regurgitation.  13. There is a bioprosthetic valve in the aortic position, appears well   seated with peak velocity within normal limits.  14. There is no evidence of pericardial effusion.    ECG (11/29/23): atrial sensed, ventricular paed   LUZ MARIA EVANS  [id number]      HPI:    Luz Maria Evans is an 83 year old man with past medical history of TAVR and Hypertension who was brought in by EMS due to progressive cough and shortness of breath.    The patient's family is present at bedside to provide additional history as patient is on BiPAP. The patient denies chest pain. The family reports he has had increasing cough and shortness of breath for the past few days. The patient follows with cardiologist, Dr. Haley at [facility].     ALLERGIES:  No Known Allergies      PAST MEDICAL & SURGICAL HISTORY:  Hypertension  TAVR    ROS:  All 10 systems reviewed and positives noted in HPI    OBJECTIVE:    VITAL SIGNS:  Vital Signs Last 24 Hrs  T(C): 36.6 (29 Nov 2023 13:40), Max: 36.6 (29 Nov 2023 13:40)  T(F): 97.9 (29 Nov 2023 13:40), Max: 97.9 (29 Nov 2023 13:40)  HR: 94 (29 Nov 2023 15:28) (89 - 94)  BP: 119/72 (29 Nov 2023 13:40) (119/72 - 119/72)  BP(mean): --  RR: 22 (29 Nov 2023 13:40) (22 - 22)  SpO2: 98% (29 Nov 2023 15:28) (98% - 100%)    Parameters below as of 29 Nov 2023 13:40  Patient On (Oxygen Delivery Method): mask, nonrebreather  O2 Flow (L/min): 15    PHYSICAL EXAM:  General: on BiPAP  HEENT: sclera anicteric  Neck: supple  CVS: JVP ~ 7 cm H20, RRR, s1, s2, no murmurs  Chest: labored respirations, decreased breath sounds  Abdomen: distended  Extremities: mild lower extremity edema b/l  Neuro: awake, alert & oriented  Psych: normal affect      LABS:                        10.1   6.95  )-----------( 140      ( 29 Nov 2023 14:20 )             31.1     11-29    136  |  99  |  37<H>  ----------------------------<  317<H>  4.5   |  29  |  2.08<H>    Ca    9.7      29 Nov 2023 14:20  Mg     2.1     11-29    TPro  7.7  /  Alb  3.6  /  TBili  0.4  /  DBili  x   /  AST  47<H>  /  ALT  28  /  AlkPhos  55  11-29        PT/INR - ( 29 Nov 2023 14:20 )   PT: 16.4 sec;   INR: 1.42 ratio         PTT - ( 29 Nov 2023 14:20 )  PTT:35.3 sec      TTE (1/2022):  LVEF 60-65%  TAVR    TTE (11/29/23):   1. Technically difficult study with poor endocardial visualization.   2. Mildly decreased global left ventricular systolic function.   3. Left ventricular ejection fraction, by visual estimation, is 45 to 50%.   4. Mildly increased LV wall thickness.   5. Normal left ventricular internal cavity size.   6. The left ventricle endocardium is not well visualized, consider use   of IV ultrasonic enhancing agent to better evaluate regional wall motion. Abnormal septal motion which may be due to paced rhythm. The mid inferior and apical inferior walls appear hypokinetic.   7. The right ventricle is not well visualized, appears to have normal   systolic function.   8. The left atrium is not well visualized, appears generally normal in   size.   9. The right atrium is not well visualized, appears generally normal in   size.  10. Mild mitral annular calcification.  11. Mild thickening and calcification of the anterior mitral valve   leaflet.  12. Mild mitral valve regurgitation.  13. There is a bioprosthetic valve in the aortic position, appears well   seated with peak velocity within normal limits.  14. There is no evidence of pericardial effusion.    ECG (11/29/23): atrial sensed, ventricular paed

## 2023-11-29 NOTE — CONSULT NOTE ADULT - ASSESSMENT
Assessment:  Alexandra Pena is an 83 year old man with past medical history of TAVR and Hypertension who was brought in by EMS due to progressive cough and shortness of breath, found to have COVID-19 and NSTEMI.    The patient's family is present at bedside to provide additional history as patient is on BiPAP. The patient denies chest china at this time but has had increasing cough and progressive dyspnea. ECG consistent with atrial sensed, ventricular paced rhythm. Troponins significantly elevated and echocardiogram consistent with mildly reduced LVEF 45-50% with apical and mid inferior hypokinesis, may be secondary to Type I NSTEMI vs myocarditis from COVID infection. Pro BNP significantly elevated and CXR consistent with left lower lobe pneumonia.    Recommendations:  [] COVID-19 pneumonia: Currently on BiPAP, recommend ICU evaluation and treatment for COVID. Management per ID and Pulmonary  [] NSTEMI       Assessment:  Alexandra Pena is an 83 year old man with past medical history of TAVR and Hypertension who was brought in by EMS due to progressive cough and shortness of breath, found to have COVID-19 and NSTEMI.    The patient's family is present at bedside to provide additional history as patient is on BiPAP. The patient denies chest china at this time but has had increasing cough and progressive dyspnea. ECG consistent with atrial sensed, ventricular paced rhythm. Troponins significantly elevated and echocardiogram consistent with mildly reduced LVEF 45-50% with apical and mid inferior hypokinesis, may be secondary to Type I NSTEMI vs myocarditis from COVID infection, troponin also may be further elevated from renal dysfunction. Pro BNP significantly elevated and CXR consistent with left lower lobe pneumonia.    Recommendations:  [] COVID-19 pneumonia: Currently on BiPAP, recommend ICU evaluation and treatment for COVID. Management per ID and Pulmonary  [] NSTEMI: Cannot rule out Type I NSTEMI, if no bleeding contraindications (monitor thrombocytopenia) would start heparin drip for possible ACS. Dose Aspirin 325 mg PO once and continue Aspirin 81 mg daily. Continue home beta blocker. Continue to trend troponin until it peaks. Continue to monitor on telemetry. Would consider ischemic evaluation this hospitalization, pending clinical course and renal function.    The patient follows with cardiologist, Dr. Haley at [facility].     Discussed with patient's wife, son and daughter who agree with the above plan. Will sign out this case to cardiologist to follow along tomorrow.    Flor Chambers MD  Cardiology    Assessment:  Alexandra Pena is an 83 year old man with past medical history of TAVR and Hypertension who was brought in by EMS due to progressive cough and shortness of breath, found to have COVID-19 and NSTEMI.    The patient's family is present at bedside to provide additional history as patient is on BiPAP. The patient denies chest china at this time but has had increasing cough and progressive dyspnea. ECG consistent with atrial sensed, ventricular paced rhythm. Troponins significantly elevated and echocardiogram consistent with mildly reduced LVEF 45-50% with apical and mid inferior hypokinesis, may be secondary to Type I NSTEMI vs myocarditis from COVID infection, troponin also may be further elevated from renal dysfunction. Pro BNP significantly elevated and CXR consistent with left lower lobe pneumonia.    Recommendations:  [] COVID-19 pneumonia: Currently on BiPAP, recommend ICU evaluation and treatment for COVID. Management per ID and Pulmonary  [] NSTEMI: Cannot rule out Type I NSTEMI, if no bleeding contraindications (monitor thrombocytopenia) would start heparin drip for possible ACS. Dose Aspirin 325 mg PO once and continue Aspirin 81 mg daily. Continue home beta blocker. Continue to trend troponin until it peaks. Continue to monitor on telemetry. Would consider ischemic evaluation this hospitalization, pending clinical course and renal function.    The patient follows with cardiologist, Dr. Haley at [facility].     Discussed with patient's wife, son and daughter who agree with the above plan. Will sign out this case to cardiologist to follow along tomorrow.    Flor Chambers MD  Cardiology    Assessment:  Alexandra Pena is an 83 year old man with past medical history of TAVR and Hypertension who was brought in by EMS due to progressive cough and shortness of breath, found to have COVID-19 and NSTEMI.    The patient's family is present at bedside to provide additional history as patient is on BiPAP. The patient denies chest china at this time but has had increasing cough and progressive dyspnea. ECG consistent with atrial sensed, ventricular paced rhythm. Troponins significantly elevated and echocardiogram consistent with mildly reduced LVEF 45-50% with apical and mid inferior hypokinesis, may be secondary to Type I NSTEMI vs myocarditis from COVID infection, troponin also may be further elevated from renal dysfunction. Pro BNP significantly elevated and CXR consistent with left lower lobe pneumonia.    Recommendations:  [] COVID-19 pneumonia: Currently on BiPAP, recommend ICU evaluation and treatment for COVID. Management per ID and Pulmonary  [] NSTEMI: Cannot rule out Type I NSTEMI, if no bleeding contraindications (monitor thrombocytopenia) would start heparin drip for possible ACS. Dose Aspirin 325 mg PO once and continue Aspirin 81 mg daily. Continue home beta blocker. Continue to trend troponin until it peaks. Continue to monitor on telemetry. Would consider ischemic evaluation this hospitalization, pending clinical course and renal function.    The patient follows with cardiologist, Dr. Haley at [facility].     Discussed with patient's wife, son and daughter who agree with the above plan. Will sign out this case to cardiologist to follow along tomorrow.    Flor Chambers MD  Cardiology    Assessment:  Alexandra Pena is an 83 year old man with past medical history of TAVR and Hypertension who was brought in by EMS due to progressive cough and shortness of breath, found to have COVID-19 and NSTEMI.    The patient's family is present at bedside to provide additional history as patient is on BiPAP. The patient denies chest china at this time but has had increasing cough and progressive dyspnea. ECG consistent with atrial sensed, ventricular paced rhythm. Troponins significantly elevated and echocardiogram consistent with mildly reduced LVEF 45-50% with apical and mid inferior hypokinesis, may be secondary to Type I NSTEMI vs myocarditis from COVID infection, troponin also may be further elevated from renal dysfunction. Pro BNP significantly elevated and CXR consistent with left lower lobe pneumonia.    Recommendations:  [] COVID-19 pneumonia: Currently on BiPAP, recommend ICU evaluation and treatment for COVID. Management per ID and Pulmonary  [] NSTEMI: Cannot rule out Type I NSTEMI, if no bleeding contraindications (monitor thrombocytopenia) would start heparin drip for possible ACS. Dose Aspirin 325 mg PO once and continue Aspirin 81 mg daily. Continue home beta blocker. Continue to trend troponin until it peaks. Continue to monitor on telemetry. Would consider ischemic evaluation this hospitalization, pending clinical course and renal function. May also need evaluation for VTE.    The patient follows with cardiologist, Dr. Haley at [facility].     Discussed with ER attending Dr. Jansen, patient's wife, son and daughter who agree with the above plan. Will sign out this case to cardiologist to follow along tomorrow.    Flor Chambers MD  Cardiology    Assessment:  Alexandra Pena is an 83 year old man with past medical history of TAVR and Hypertension who was brought in by EMS due to progressive cough and shortness of breath, found to have COVID-19 and NSTEMI.    The patient's family is present at bedside to provide additional history as patient is on BiPAP. The patient denies chest china at this time but has had increasing cough and progressive dyspnea. ECG consistent with atrial sensed, ventricular paced rhythm. Troponins significantly elevated and echocardiogram consistent with mildly reduced LVEF 45-50% with apical and mid inferior hypokinesis, may be secondary to Type I NSTEMI vs myocarditis from COVID infection, troponin also may be further elevated from renal dysfunction. Pro BNP significantly elevated and CXR consistent with left lower lobe pneumonia.    Recommendations:  [] COVID-19 pneumonia: Currently on BiPAP, recommend ICU evaluation and treatment for COVID. Management per ID and Pulmonary  [] NSTEMI: Cannot rule out Type I NSTEMI, if no bleeding contraindications (monitor thrombocytopenia) would start heparin drip for possible ACS. Dose Aspirin 325 mg PO once and continue Aspirin 81 mg daily. Continue home beta blocker. Continue to trend troponin until it peaks. Continue to monitor on telemetry. Would consider ischemic evaluation this hospitalization, pending clinical course and renal function. May also need evaluation for VTE.    The patient follows with cardiologist, Dr. Haley at [facility].     Discussed with ER attending Dr. Jansen, patient's wife, son and daughter who agree with the above plan. Will sign out this case to cardiologist to follow along tomorrow.    Flor Chambers MD  Cardiology    Assessment:  Alexandra Pena is an 83 year old man with past medical history of TAVR and Hypertension who was brought in by EMS due to progressive cough and shortness of breath, found to have COVID-19 and NSTEMI.    The patient's family is present at bedside to provide additional history as patient is on BiPAP. The patient denies chest china at this time but has had increasing cough and progressive dyspnea. ECG consistent with atrial sensed, ventricular paced rhythm. Troponins significantly elevated and echocardiogram consistent with mildly reduced LVEF 45-50% with apical and mid inferior hypokinesis, may be secondary to Type I NSTEMI vs myocarditis from COVID infection, troponin also may be further elevated from renal dysfunction. Pro BNP significantly elevated and CXR consistent with left lower lobe pneumonia.    Recommendations:  [] COVID-19 pneumonia: Currently on BiPAP, recommend ICU evaluation and treatment for COVID. Management per ID and Pulmonary  [] NSTEMI: Cannot rule out Type I NSTEMI, if no bleeding contraindications (monitor thrombocytopenia) would start heparin drip for possible ACS. Dose Aspirin 325 mg PO once and continue Aspirin 81 mg daily. Continue home beta blocker. Continue to trend troponin until it peaks. Continue to monitor on telemetry. Would consider ischemic evaluation this hospitalization, pending clinical course and renal function. May also need evaluation for VTE.    The patient follows with cardiologist, Dr. Haley at [facility].     Discussed with ER attending Dr. Jansen, patient's wife, son and daughter who agree with the above plan. Will sign out this case to cardiologist to follow along tomorrow.    Flor Chambers MD  Cardiology    Assessment:  Alexandra Pena is an 83 year old man with past medical history of TAVR and Hypertension who was brought in by EMS due to progressive cough and shortness of breath, found to have COVID-19 and NSTEMI.    The patient's family is present at bedside to provide additional history as patient is on BiPAP. The patient denies chest pain at this time but has had increasing cough and progressive dyspnea. ECG consistent with atrial sensed, ventricular paced rhythm. Troponins significantly elevated and echocardiogram consistent with mildly reduced LVEF 45-50% with apical and mid inferior hypokinesis, may be secondary to Type I NSTEMI vs myocarditis from COVID infection, troponin also may be further elevated from renal dysfunction. Pro BNP significantly elevated and CXR consistent with left lower lobe pneumonia.    Recommendations:  [] COVID-19 pneumonia: Currently on BiPAP, recommend ICU evaluation and treatment for COVID. Management per ID and Pulmonary  [] NSTEMI: Cannot rule out Type I NSTEMI, if no bleeding contraindications (monitor thrombocytopenia) would start heparin drip for possible ACS. Dose Aspirin 325 mg PO once and continue Aspirin 81 mg daily. Continue home beta blocker. Continue to trend troponin until it peaks. Continue to monitor on telemetry. Would consider ischemic evaluation this hospitalization, pending clinical course and renal function. May also need evaluation for VTE.    The patient follows with cardiologist, Dr. Haley at [facility].     Discussed with ER attending Dr. Jansen, patient's wife, son and daughter who agree with the above plan. Will sign out this case to cardiologist to follow along tomorrow.    Flor Chambers MD  Cardiology    Assessment:  Alexandra Pena is an 83 year old man with past medical history of TAVR and Hypertension who was brought in by EMS due to progressive cough and shortness of breath, found to have COVID-19 and NSTEMI.    The patient's family is present at bedside to provide additional history as patient is on BiPAP. The patient denies chest pain at this time but has had increasing cough and progressive dyspnea. ECG consistent with atrial sensed, ventricular paced rhythm. Troponins significantly elevated and echocardiogram consistent with mildly reduced LVEF 45-50% with apical and mid inferior hypokinesis, may be secondary to Type I NSTEMI vs myocarditis from COVID infection, troponin also may be further elevated from renal dysfunction. Pro BNP significantly elevated and CXR consistent with left lower lobe pneumonia.    Recommendations:  [] COVID-19 pneumonia: Currently on BiPAP, recommend ICU evaluation and treatment for COVID. Management per ID and Pulmonary  [] NSTEMI: Cannot rule out Type I NSTEMI, if no bleeding contraindications (monitor thrombocytopenia) would start heparin drip for possible ACS. Dose Aspirin 325 mg PO once and continue Aspirin 81 mg daily. Continue home beta blocker. Continue to trend troponin until it peaks. Continue to monitor on telemetry. Would consider ischemic evaluation this hospitalization, pending clinical course and renal function. May also need evaluation for VTE.    The patient follows with cardiologist, Dr. Haley at [facility].     Discussed with ER attending Dr. Jansen, patient's wife, son and daughter who agree with the above plan. Will sign out this case to cardiologist to follow along tomorrow.    Flor Chambers MD  Cardiology    Assessment:  Alexandra Pena is an 83 year old man with past medical history of TAVR and Hypertension who was brought in by EMS due to progressive cough and shortness of breath, found to have COVID-19 and NSTEMI.    The patient's family is present at bedside to provide additional history as patient is on BiPAP. The patient denies chest pain at this time but has had increasing cough and progressive dyspnea. ECG consistent with atrial sensed, ventricular paced rhythm. Troponins significantly elevated and echocardiogram consistent with mildly reduced LVEF 45-50% with apical and mid inferior hypokinesis, may be secondary to Type I NSTEMI vs myocarditis from COVID infection, troponin also may be further elevated from renal dysfunction. Pro BNP significantly elevated and CXR consistent with left lower lobe pneumonia.    Recommendations:  [] COVID-19 pneumonia: Currently on BiPAP, recommend ICU evaluation and treatment for COVID. Management per ID and Pulmonary  [] NSTEMI: Cannot rule out Type I NSTEMI, if no bleeding contraindications (monitor thrombocytopenia) would start heparin drip for possible ACS. Dose Aspirin 325 mg PO once and continue Aspirin 81 mg daily. Continue home beta blocker. Continue to trend troponin until it peaks. Continue to monitor on telemetry. Would consider ischemic evaluation this hospitalization, pending clinical course and renal function. May also need evaluation for VTE.    The patient follows with cardiologist, Dr. Haley at [facility].     Discussed with ER attending Dr. Jansen, patient's wife, son and daughter who agree with the above plan. Will sign out this case to cardiologist to follow along tomorrow.    Flor Chambers MD  Cardiology

## 2023-11-29 NOTE — H&P ADULT - ASSESSMENT
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, type 2 DM, right lung mass presenting with SOB, found to be COVID positive with evidence of acute decompensated heart failure ad NSTEMI

## 2023-11-29 NOTE — H&P ADULT - PROBLEM SELECTOR PROBLEM 5
Progress Notes by Carola Hurley MD at 18 01:00 PM     Author:  Carola Hurley MD Service:  (none) Author Type:  Physician     Filed:  18 01:00 PM Encounter Date:  2018 Status:  Signed     :  Carola Hurley MD (Physician)              History & Physical       Patient Name: Maldonado Laura Location: [facility]   MRN: [id number]    : 1948    Date of Surgery: 2018      Physician: Carola Hurley MD                     HISTORY:  Pre-op Diagnosis:[AB1.1T] hx of TA[AB1.1M]  Proposed Surgery:[AB1.1T] colon[AB1.1M]     HPI:  Maldonado Laura is a 70 year old male[AB1.1T] with hx of TA[AB1.1M]   No past medical history on file.   Patient Active Problem List    Diagnosis    • Diabetes type 2, uncontrolled   • DIABETIC EYE EXAM   • Myopia   • Astigmatism, unspecified   • Other seborrheic keratosis   • Inguinal hernia, right   • Renal stone   • Elevated PSA   • Diverticulosis of large intestine without hemorrhage     Current Outpatient Prescriptions     Medication  Sig   • simethicone (MYLICON) 125 MG chewable tablet See Colonoscopy instructions.   • bisacodyl (DULCOLAX) 5 MG EC tablet See Colonoscopy Instructions.   • Na Sulfate-K Sulfate-Mg Sulf (SUPREP BOWEL PREP KIT) 17.5-3.13-1.6 GM/180ML SOLN See Colonoscopy Instructions.   • lisinopril (PRINIVIL,ZESTRIL) 5 MG tablet TAKE 1 TABLET BY MOUTH DAILY   • glipiZIDE (GLUCOTROL) 5 MG 24 hr tablet Take 1 Tab by mouth every morning.   • Probiotic Product (PROBIOTIC FORMULA OR) Take  by mouth.   • FISH OIL Take  by mouth daily.   • ONE TOUCH ULTRASOFT LANCETS MISC 1 Each 3 (three) times daily. Test as directed.   • glucose blood (ONE TOUCH ULTRA TEST STRIPS) test strip 1 Each 3 (three) times daily. Test as directed.   • Aspirin 81 MG tablet Take 81 mg by mouth daily.   • GLUCOCOM MONITOR W/DEVICE KIT testing TID     No Known Allergies     Social History:  Social History     Social History      • Marital status:       Spouse name: N/A   • Number of  children:  N/A   • Years of education:  N/A     Occupational History    • Not on file.     Social History Main Topics      • Smoking status:  Never Smoker   • Smokeless tobacco:  Never Used   • Alcohol use  No   • Drug use:  No   • Sexual activity:  Yes     Partners: Female     Other Topics  Concern   • .... Medical Equipment .... No   • Cpap No   • Occupational Hazards No   • Oxygen No   • Other Home Medical Equipment No   • Financial Barriers Impacting Healthcare No   • Cane No   • .... Lifestyle .... Yes   • Support System No   • Walker No   • Hazardous Hobbies No   • Cultural Needs No   • Wheel Chair No   • Seat Belt Yes     Social History Narrative       PERTINENT REVIEW OF SYSTEMS:[AB1.1T]  No contributory complaints[AB1.1M]     PHYSICAL EXAMINATION:  Vitals Signs Stable[AB1.1T] YES[AB1.1M]    Weight As of 10/08/2018 weight is 146 lbs.(66.225 kg). Height is 5' 9\"(1.753 m).    Mental Status: Awake & alert, O x 3[AB1.1T] YES[AB1.1M]   HEENT: No Gross lesion noted[AB1.1T] YES[AB1.1M]    Pupils round & equal[AB1.1T] YES[AB1.1M]    No icterus[AB1.1T] YES[AB1.1M]   Heart: Regular rate & rhythm[AB1.1T] YES[AB1.1M]   Lungs: Clear to auscultation[AB1.1T] YES[AB1.1M]   Abdomen: Soft and non-tender[AB1.1T] YES[AB1.1M]   Extremities: No clubbing, cyanosis or edema[AB1.1T] YES[AB1.1M]     Other:          Maldonado Laura is cleared for surgery in the ambulatory setting.       Electronically signed by: Electronically Signed by:    Carola Hurley MD , 11/9/2018[AB1.1T]         Revision History        User Key Date/Time User Provider Type Action    > AB1.1 11/09/18 01:00 PM Carola Hurley MD Physician Sign    M - Manual, T - Template             Lung mass

## 2023-11-29 NOTE — ED ADULT NURSE NOTE - NSFALLUNIVINTERV_ED_ALL_ED
Bed/Stretcher in lowest position, wheels locked, appropriate side rails in place/Call bell, personal items and telephone in reach/Instruct patient to call for assistance before getting out of bed/chair/stretcher/Non-slip footwear applied when patient is off stretcher/[place] to call system/Physically safe environment - no spills, clutter or unnecessary equipment/Purposeful proactive rounding/Room/bathroom lighting operational, light cord in reach Bed/Stretcher in lowest position, wheels locked, appropriate side rails in place/Call bell, personal items and telephone in reach/Instruct patient to call for assistance before getting out of bed/chair/stretcher/Non-slip footwear applied when patient is off stretcher/[place] to call system/Physically safe environment - no spills, clutter or unnecessary equipment/Purposeful proactive rounding/Room/bathroom lighting operational, light cord in reach Bed/Stretcher in lowest position, wheels locked, appropriate side rails in place/Call bell, personal items and telephone in reach/Instruct patient to call for assistance before getting out of bed/chair/stretcher/Non-slip footwear applied when patient is off stretcher/[place] to call system/Physically safe environment - no spills, clutter or unnecessary equipment/Purposeful proactive rounding/Room/bathroom lighting operational, light cord in reach

## 2023-11-29 NOTE — PATIENT PROFILE ADULT - FUNCTIONAL ASSESSMENT - BASIC MOBILITY 3.
Subjective   Patient ID:  Nick Church is a 67 y.o. male.  Chief Complaint:    Chief Complaint   Patient presents with   • Right Knee - Follow-Up    Follow-Up of the Right Knee    Last Surgery: Right Below Knee Amputation - Right on 9/7/2021      [facility]  Nick Church is here for follow-up of his right leg status post BKA. He is having increased medial and lateral pain at his stump site. He has difficulties with putting on and walking in his prosthetic due to this pain. He has been in contact with Rommel from [employer] for prothesis adjustments.    ROS  Constitutional: Negative for fever, chills/sweats   Respiratory: Negative for shortness of breath, [place]  Cardiovascular: Negative for chest pain or pressure  GI/: Negative for diarrhea/constipation / urinary difficulty  All other systems reviewed and are negative except as per HPI.     Objective   Ortho Exam  Incision is healed nicely without any evidence of wound dehiscence, cellulitis, or infection. Good knee range of motion. He is tender on both lateral and medial aspects of his stump with some slightly prominent skin flaps. Mild soft tissue swelling. Sensations intact to light touch at his distal stump. He has some increased sensitivity at his stump incision.    Last Imaging Result(s):   None today    Assessment & Plan   Encounter Diagnoses:   7 months status post right below knee amputation, with pain at his medial and lateral flap.  He is also having pain across the anterior stump.    I had a thorough discussion with the patient regarding the diagnosis including both operative and nonoperative treatment.  After obtaining consent from the patient, we will proceed with operative management. I recommended a right revision BKA scar, repairs as indicated. Risks of surgery include, but not limited to, wound problems, infection, nerve injury, vascular injury, need for further surgery. There is also risk for weakness, stiffness, blood clots, recurrence of any  deformity and chance for reinjury. I discussed the risks/ benefits/ complications associated with narcotic use including the potential for addiction. All of the patient's questions were answered today. We will schedule this in the near future at his convenience. I will follow up with him postoperatively. I explained that the surgery may not have influence on the pain at the central portion of the stump.  He responded with a verbal understanding of this.    Orders Placed This Encounter   • Surgical Case Request: SURGICAL CASE REQUEST     Procedures     I, Vi Iqbal, am scribing for, and in the presence of Boubacar Kevin MD.    I, Boubacar Kevin MD, personally performed the services described in this documentation, as scribed by, Vi Iqbal, in my presence. I do hereby attest this information is true, accurate, and complete to the best of my knowledge.      4 = No assist / stand by assistance

## 2023-11-29 NOTE — CHART NOTE - NSCHARTNOTEFT_GEN_A_CORE
Emergency Department Social Work Geriatric Initial Assessment    Cognitive Mental Status - Alert, Oriented  Primary Caregiver Information – Angeline Pena [phone]  Emergency Contact Information – Angeline Pena [phone]  Primary Care Physician / Specialists - Dr. KERMIT Burton [phone]  Functional Status Prior to ED Visit - Independent   Family and Social Support – Patient has family support (children and spouse)  Living Arrangement -  Patient lives with spouse in private house.  Services Present on Admission – Patient has no services.  Assistive Devices (Durable Medical Equipment) –  Patient is independent of assistive devices.  ADLs & Degree of [place] – Patient reported to perform his ADLs and IADLs adequately.  Barriers to obtain medications -  No barriers to receiving medication  Barriers to attend medical appointments -  No barriers to attending medication  ISAR Score – Not yet established  Advanced Directives – Not sure  Follow up care – To be determine  Discharge Transportation – Daughter will transport patient home  Summary/Recommendations/Referrals -    Patient lives with spouse.  Patient's daughter, at bedside, confirmed that the patient is independent of assistive devices and is capable of performing self care chores.  Referral and recommendation is pending.  SW is available to assist with scheduling referrals. Emergency Department Social Work Geriatric Initial Assessment    Cognitive Mental Status - Alert, Oriented  Primary Caregiver Information – Angeline Pena [phone]  Emergency Contact Information – Angeline Pena [phone]  Primary Care Physician / Specialists - Dr. KERMIT Burton [phone]  Functional Status Prior to ED Visit - Independent   Family and Social Support – Patient has family support (children and spouse)  Living Arrangement -  Patient lives with spouse in private house.  Services Present on Admission – Patient has no services.  Assistive Devices (Durable Medical Equipment) –  Patient is independent of assistive devices.  ADLs & Degree of [place] – Patient reported to perform his ADLs and IADLs adequately.  Barriers to obtain medications -  No barriers to receiving medication  Barriers to attend medical appointments -  No barriers to attending medication  ISAR Score – Not yet established  Advanced Directives – Not sure  Follow up care – To be determine  Discharge Transportation – Daughter will transport patient home  Summary/Recommendations/Referrals -    Patient lives with spouse.  Patient's daughter, at bedside, confirmed that the patient is independent of assistive devices and is capable of performing self care chores.  Referral and recommendation is pending.  SW is available to assist with scheduling referrals. Emergency Department Social Work Geriatric Initial Assessment    Cognitive Mental Status - Alert, Oriented  Primary Caregiver Information – Angeline Pena [phone]  Emergency Contact Information – Angeline Pena [phone]  Primary Care Physician / Specialists - Dr. KERMIT Burton [phone]  Functional Status Prior to ED Visit - Independent   Family and Social Support – Patient has family support (children and spouse)  Living Arrangement -  Patient lives with spouse in private house.  Services Present on Admission – Patient has no services.  Assistive Devices (Durable Medical Equipment) –  Patient is independent of assistive devices.  ADLs & Degree of [place] – Patient reported to perform his ADLs and IADLs adequately.  Barriers to obtain medications -  No barriers to receiving medication  Barriers to attend medical appointments -  No barriers to attending medication  ISAR Score – Not yet established  Advanced Directives – Not sure  Follow up care – To be determine  Discharge Transportation – Daughter will transport patient home  Summary/Recommendations/Referrals -    Patient lives with spouse.  Patient's daughter, at bedside, confirmed that the patient is independent of assistive devices and is capable of performing self care chores.  Referral and recommendation is pending.  SW is available to assist with scheduling referrals.

## 2023-11-29 NOTE — H&P ADULT - NSICDXPASTMEDICALHX_GEN_ALL_CORE_FT
PAST MEDICAL HISTORY:  Aortic stenosis     Dyslipidemia     HTN (hypertension)     Lung mass     Type 2 diabetes mellitus

## 2023-11-29 NOTE — H&P ADULT - NSICDXPASTSURGICALHX_GEN_ALL_CORE_FT
Any update on PA status?   PAST SURGICAL HISTORY:  History of appendectomy     History of inguinal hernia repair, bilateral     History of transcatheter aortic valve replacement (TAVR)

## 2023-11-29 NOTE — H&P ADULT - CONVERSATION DETAILS
Discussed advanced directives with patient and son at bedside.  Patient at this time is full code.    Patient's spouse is involved in care, son states that he believes there is a HCP at home but does not know who it names as proxy, states he will bring it in if he finds it.

## 2023-11-29 NOTE — H&P ADULT - CRITICAL CARE ATTENDING COMMENT
pt seen and examiend in ED  case d/w Son    Assessment  NSTEMI  COVID 19   Hypoxic respiratory failure  with respiratory distress requiring NIV  ALEXI unsure of baseline  H/o right upper lobe spiculated lung nodule with second nodule in same lung, possible stage 4 cancer   Undelrying VHD s/p TAVR, HTN (hypertension), DM2, high chol    Plan  admit to ICU  NIV for now, taper down as tolerated  decadron ,remdisivir  heparin drip, asa, statin, bb  hold ace/arb due to possible kel  Advance diet as tolerated  check echo  cardio f/u  Trend troponin

## 2023-11-29 NOTE — ED PROVIDER NOTE - CLINICAL SUMMARY MEDICAL DECISION MAKING FREE TEXT BOX
83M PMHX TAVR, on Eliquis, HTN, HLD presenting with shortness of breath, x 1 day. Describes mild - mod shortness of breath, dyspnea on exertion, no chest pain. A/w coughing, nonproductive. ROS: Otherwise, (-) tearing or ripping quality, (-) diaphoresis,   (-) pleuritic component, (-) positional component, (-) abdominal pain, (-) nausea/vomiting, (-) fevers/chills, (-) recent illness, (-) traumatic injury,  , (-) coughing,   (-) tobacco, (-) IVDU or cocaine use.  History obtained from independent historian: Parent, caregiver, EMS   External note reviewed:   DDx: chf acs pna   ED course, interpretation of imaging studies, and consults:   - ICU consulted and Cardiology Dr. Chambers Recommend ICU admit for NSTEMI, PNA, CHF.   Consideration hospitalization vs de-escalation of care:   - To be admitted  Disposition: Admit ICU

## 2023-11-30 LAB
A1C WITH ESTIMATED AVERAGE GLUCOSE RESULT: 7.4 % — HIGH (ref 4–5.6)
ALBUMIN SERPL ELPH-MCNC: 2.9 G/DL — LOW (ref 3.3–5)
ALP SERPL-CCNC: 43 U/L — SIGNIFICANT CHANGE UP (ref 40–120)
ALT FLD-CCNC: 29 U/L — SIGNIFICANT CHANGE UP (ref 10–45)
ANION GAP SERPL CALC-SCNC: 10 MMOL/L — SIGNIFICANT CHANGE UP (ref 5–17)
APPEARANCE UR: CLEAR — SIGNIFICANT CHANGE UP
APTT BLD: 104.1 SEC — HIGH (ref 24.5–35.6)
APTT BLD: 54.7 SEC — HIGH (ref 24.5–35.6)
APTT BLD: 58.1 SEC — HIGH (ref 24.5–35.6)
APTT BLD: 80.5 SEC — HIGH (ref 24.5–35.6)
AST SERPL-CCNC: 93 U/L — HIGH (ref 10–40)
BACTERIA # UR AUTO: ABNORMAL /HPF
BILIRUB SERPL-MCNC: 0.4 MG/DL — SIGNIFICANT CHANGE UP (ref 0.2–1.2)
BILIRUB UR-MCNC: NEGATIVE — SIGNIFICANT CHANGE UP
BUN SERPL-MCNC: 45 MG/DL — HIGH (ref 7–23)
CALCIUM SERPL-MCNC: 9.4 MG/DL — SIGNIFICANT CHANGE UP (ref 8.4–10.5)
CHLORIDE SERPL-SCNC: 101 MMOL/L — SIGNIFICANT CHANGE UP (ref 96–108)
CO2 SERPL-SCNC: 27 MMOL/L — SIGNIFICANT CHANGE UP (ref 22–31)
COLOR SPEC: YELLOW — SIGNIFICANT CHANGE UP
CREAT SERPL-MCNC: 2.11 MG/DL — HIGH (ref 0.5–1.3)
D DIMER BLD IA.RAPID-MCNC: 297 NG/ML DDU — HIGH
DIFF PNL FLD: NEGATIVE — SIGNIFICANT CHANGE UP
EGFR: 30 ML/MIN/1.73M2 — LOW
EPI CELLS # UR: 0 — SIGNIFICANT CHANGE UP
ESTIMATED AVERAGE GLUCOSE: 166 MG/DL — HIGH (ref 68–114)
FERRITIN SERPL-MCNC: 240 NG/ML — SIGNIFICANT CHANGE UP (ref 30–400)
GLUCOSE BLDC GLUCOMTR-MCNC: 147 MG/DL — HIGH (ref 70–99)
GLUCOSE BLDC GLUCOMTR-MCNC: 187 MG/DL — HIGH (ref 70–99)
GLUCOSE BLDC GLUCOMTR-MCNC: 225 MG/DL — HIGH (ref 70–99)
GLUCOSE BLDC GLUCOMTR-MCNC: 265 MG/DL — HIGH (ref 70–99)
GLUCOSE SERPL-MCNC: 226 MG/DL — HIGH (ref 70–99)
GLUCOSE UR QL: 100 MG/DL
HCT VFR BLD CALC: 25.4 % — LOW (ref 39–50)
HCT VFR BLD CALC: 27 % — LOW (ref 39–50)
HCT VFR BLD CALC: 27.2 % — LOW (ref 39–50)
HGB BLD-MCNC: 8.4 G/DL — LOW (ref 13–17)
HGB BLD-MCNC: 8.8 G/DL — LOW (ref 13–17)
HGB BLD-MCNC: 8.9 G/DL — LOW (ref 13–17)
INR BLD: 1.37 RATIO — HIGH (ref 0.85–1.18)
KETONES UR-MCNC: ABNORMAL MG/DL
LDH SERPL L TO P-CCNC: 263 U/L — HIGH (ref 50–242)
LEUKOCYTE ESTERASE UR-ACNC: NEGATIVE — SIGNIFICANT CHANGE UP
MAGNESIUM SERPL-MCNC: 2 MG/DL — SIGNIFICANT CHANGE UP (ref 1.6–2.6)
MCHC RBC-ENTMCNC: 32.6 GM/DL — SIGNIFICANT CHANGE UP (ref 32–36)
MCHC RBC-ENTMCNC: 32.7 GM/DL — SIGNIFICANT CHANGE UP (ref 32–36)
MCHC RBC-ENTMCNC: 33.1 GM/DL — SIGNIFICANT CHANGE UP (ref 32–36)
MCHC RBC-ENTMCNC: 34.8 PG — HIGH (ref 27–34)
MCHC RBC-ENTMCNC: 34.9 PG — HIGH (ref 27–34)
MCHC RBC-ENTMCNC: 35.3 PG — HIGH (ref 27–34)
MCV RBC AUTO: 106.7 FL — HIGH (ref 80–100)
MRSA PCR RESULT.: SIGNIFICANT CHANGE UP
NITRITE UR-MCNC: NEGATIVE — SIGNIFICANT CHANGE UP
NRBC # BLD: 0 /100 WBCS — SIGNIFICANT CHANGE UP (ref 0–0)
NT-PROBNP SERPL-SCNC: HIGH PG/ML (ref 0–300)
PH UR: 5 — SIGNIFICANT CHANGE UP (ref 5–8)
PHOSPHATE SERPL-MCNC: 3.5 MG/DL — SIGNIFICANT CHANGE UP (ref 2.5–4.5)
PLATELET # BLD AUTO: 115 K/UL — LOW (ref 150–400)
PLATELET # BLD AUTO: 120 K/UL — LOW (ref 150–400)
PLATELET # BLD AUTO: 124 K/UL — LOW (ref 150–400)
POTASSIUM SERPL-MCNC: 5.1 MMOL/L — SIGNIFICANT CHANGE UP (ref 3.5–5.3)
POTASSIUM SERPL-SCNC: 5.1 MMOL/L — SIGNIFICANT CHANGE UP (ref 3.5–5.3)
PROCALCITONIN SERPL-MCNC: 0.47 NG/ML — HIGH
PROT SERPL-MCNC: 7 G/DL — SIGNIFICANT CHANGE UP (ref 6–8.3)
PROT UR-MCNC: 30 MG/DL
PROTHROM AB SERPL-ACNC: 15.5 SEC — HIGH (ref 9.5–13)
RBC # BLD: 2.38 M/UL — LOW (ref 4.2–5.8)
RBC # BLD: 2.53 M/UL — LOW (ref 4.2–5.8)
RBC # BLD: 2.55 M/UL — LOW (ref 4.2–5.8)
RBC # FLD: 17.2 % — HIGH (ref 10.3–14.5)
RBC # FLD: 17.3 % — HIGH (ref 10.3–14.5)
RBC CASTS # UR COMP ASSIST: 0 /HPF — SIGNIFICANT CHANGE UP (ref 0–4)
S AUREUS DNA NOSE QL NAA+PROBE: SIGNIFICANT CHANGE UP
SODIUM SERPL-SCNC: 138 MMOL/L — SIGNIFICANT CHANGE UP (ref 135–145)
SP GR SPEC: 1.02 — SIGNIFICANT CHANGE UP (ref 1–1.03)
TROPONIN I, HIGH SENSITIVITY RESULT: 7121.6 NG/L — HIGH
TROPONIN I, HIGH SENSITIVITY RESULT: 7999.4 NG/L — HIGH
TROPONIN I, HIGH SENSITIVITY RESULT: 8105.2 NG/L — HIGH
TROPONIN I, HIGH SENSITIVITY RESULT: 9050.6 NG/L — HIGH
UROBILINOGEN FLD QL: 0.2 MG/DL — SIGNIFICANT CHANGE UP (ref 0.2–1)
WBC # BLD: 6.68 K/UL — SIGNIFICANT CHANGE UP (ref 3.8–10.5)
WBC # BLD: 7.92 K/UL — SIGNIFICANT CHANGE UP (ref 3.8–10.5)
WBC # BLD: 9.39 K/UL — SIGNIFICANT CHANGE UP (ref 3.8–10.5)
WBC # FLD AUTO: 6.68 K/UL — SIGNIFICANT CHANGE UP (ref 3.8–10.5)
WBC # FLD AUTO: 7.92 K/UL — SIGNIFICANT CHANGE UP (ref 3.8–10.5)
WBC # FLD AUTO: 9.39 K/UL — SIGNIFICANT CHANGE UP (ref 3.8–10.5)
WBC UR QL: 1 /HPF — SIGNIFICANT CHANGE UP (ref 0–5)

## 2023-11-30 PROCEDURE — 99223 1ST HOSP IP/OBS HIGH 75: CPT

## 2023-11-30 PROCEDURE — 99497 ADVNCD CARE PLAN 30 MIN: CPT | Mod: 25

## 2023-11-30 PROCEDURE — 93010 ELECTROCARDIOGRAM REPORT: CPT

## 2023-11-30 PROCEDURE — 99232 SBSQ HOSP IP/OBS MODERATE 35: CPT

## 2023-11-30 PROCEDURE — 71045 X-RAY EXAM CHEST 1 VIEW: CPT | Mod: 26

## 2023-11-30 PROCEDURE — 76775 US EXAM ABDO BACK WALL LIM: CPT | Mod: 26

## 2023-11-30 RX ORDER — HEPARIN SODIUM 5000 [USP'U]/ML
3000 INJECTION INTRAVENOUS; SUBCUTANEOUS EVERY 6 HOURS
Refills: 0 | Status: DISCONTINUED | OUTPATIENT
Start: 2023-11-30 | End: 2023-12-01

## 2023-11-30 RX ORDER — HEPARIN SODIUM 5000 [USP'U]/ML
750 INJECTION INTRAVENOUS; SUBCUTANEOUS
Qty: 25000 | Refills: 0 | Status: DISCONTINUED | OUTPATIENT
Start: 2023-11-30 | End: 2023-12-01

## 2023-11-30 RX ORDER — HEPARIN SODIUM 5000 [USP'U]/ML
6500 INJECTION INTRAVENOUS; SUBCUTANEOUS EVERY 6 HOURS
Refills: 0 | Status: DISCONTINUED | OUTPATIENT
Start: 2023-11-30 | End: 2023-12-01

## 2023-11-30 RX ORDER — INSULIN LISPRO 100/ML
VIAL (ML) SUBCUTANEOUS
Refills: 0 | Status: DISCONTINUED | OUTPATIENT
Start: 2023-11-30 | End: 2023-12-01

## 2023-11-30 RX ORDER — METOPROLOL TARTRATE 50 MG
25 TABLET ORAL EVERY 12 HOURS
Refills: 0 | Status: DISCONTINUED | OUTPATIENT
Start: 2023-11-30 | End: 2023-12-01

## 2023-11-30 RX ADMIN — HEPARIN SODIUM 750 UNIT(S)/HR: 5000 INJECTION INTRAVENOUS; SUBCUTANEOUS at 03:19

## 2023-11-30 RX ADMIN — REMDESIVIR 200 MILLIGRAM(S): 5 INJECTION INTRAVENOUS at 23:00

## 2023-11-30 RX ADMIN — CHLORHEXIDINE GLUCONATE 1 APPLICATION(S): 213 SOLUTION TOPICAL at 05:20

## 2023-11-30 RX ADMIN — SIMVASTATIN 40 MILLIGRAM(S): 20 TABLET, FILM COATED ORAL at 21:19

## 2023-11-30 RX ADMIN — PANTOPRAZOLE SODIUM 40 MILLIGRAM(S): 20 TABLET, DELAYED RELEASE ORAL at 06:06

## 2023-11-30 RX ADMIN — Medication 100 MILLIGRAM(S): at 05:20

## 2023-11-30 RX ADMIN — Medication 25 MILLIGRAM(S): at 19:42

## 2023-11-30 RX ADMIN — Medication 500 MILLIGRAM(S): at 12:59

## 2023-11-30 RX ADMIN — HEPARIN SODIUM 950 UNIT(S)/HR: 5000 INJECTION INTRAVENOUS; SUBCUTANEOUS at 10:42

## 2023-11-30 RX ADMIN — INSULIN GLARGINE 15 UNIT(S): 100 INJECTION, SOLUTION SUBCUTANEOUS at 00:39

## 2023-11-30 RX ADMIN — Medication 25 MILLIGRAM(S): at 10:42

## 2023-11-30 RX ADMIN — HEPARIN SODIUM 0 UNIT(S)/HR: 5000 INJECTION INTRAVENOUS; SUBCUTANEOUS at 02:03

## 2023-11-30 RX ADMIN — HEPARIN SODIUM 3000 UNIT(S): 5000 INJECTION INTRAVENOUS; SUBCUTANEOUS at 10:42

## 2023-11-30 RX ADMIN — Medication 4: at 05:22

## 2023-11-30 RX ADMIN — Medication 6 MILLIGRAM(S): at 05:22

## 2023-11-30 RX ADMIN — Medication 100 MILLIGRAM(S): at 18:04

## 2023-11-30 RX ADMIN — Medication 6: at 21:25

## 2023-11-30 RX ADMIN — Medication 2: at 17:16

## 2023-11-30 RX ADMIN — CEFEPIME 100 MILLIGRAM(S): 1 INJECTION, POWDER, FOR SOLUTION INTRAMUSCULAR; INTRAVENOUS at 05:20

## 2023-11-30 RX ADMIN — AMLODIPINE BESYLATE 10 MILLIGRAM(S): 2.5 TABLET ORAL at 05:21

## 2023-11-30 RX ADMIN — Medication 1 TABLET(S): at 12:59

## 2023-11-30 RX ADMIN — INSULIN GLARGINE 15 UNIT(S): 100 INJECTION, SOLUTION SUBCUTANEOUS at 21:24

## 2023-11-30 RX ADMIN — Medication 81 MILLIGRAM(S): at 12:58

## 2023-11-30 RX ADMIN — HEPARIN SODIUM 950 UNIT(S)/HR: 5000 INJECTION INTRAVENOUS; SUBCUTANEOUS at 18:44

## 2023-11-30 RX ADMIN — CEFEPIME 100 MILLIGRAM(S): 1 INJECTION, POWDER, FOR SOLUTION INTRAMUSCULAR; INTRAVENOUS at 18:04

## 2023-11-30 NOTE — DIETITIAN INITIAL EVALUATION ADULT - PERTINENT LABORATORY DATA
11-30    138  |  101  |  45<H>  ----------------------------<  226<H>  5.1   |  27  |  2.11<H>    Ca    9.4      30 Nov 2023 05:30  Phos  3.5     11-30  Mg     2.0     11-30    TPro  7.0  /  Alb  2.9<L>  /  TBili  0.4  /  DBili  x   /  AST  93<H>  /  ALT  29  /  AlkPhos  43  11-30  POCT Blood Glucose.: 147 mg/dL (11-30-23 @ 12:56)  A1C with Estimated Average Glucose Result: 7.4 % (11-30-23 @ 05:30)

## 2023-11-30 NOTE — CONSULT NOTE ADULT - SUBJECTIVE AND OBJECTIVE BOX
HPI: 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, type 2 DM, right lung mass who presented to ED today via EMS from home with history of progressive SOB associated with non-productive cough over the past several days. Denies chest pain, fevers, chills, nausea, abdominal pain, lightheadedness, dizziness, palpitations, irregular HR, sick contacts        PAST MEDICAL & SURGICAL HISTORY:  HTN (hypertension)      Aortic stenosis      Type 2 diabetes mellitus      Lung mass      Dyslipidemia      History of transcatheter aortic valve replacement (TAVR)      History of inguinal hernia repair, bilateral      History of appendectomy          SOCIAL HISTORY:    Admitted from:  home    Substance abuse history:              Tobacco hx:                  Alcohol hx:              Home Opioid hx:  [employer]:                                    Preferred Language:    Surrogate/HCP/:     Wife Malika        Phone#: [phone]    alt : john Bethea [phone]    FAMILY HISTORY:  No significant family history      Baseline ADLs (prior to admission):    Allergies    No Known Allergies    Intolerances      Present Symptoms:   Dyspnea:   no, better  Nausea/Vomiting:   Anxiety:  Depressed   Fatigue: no  Loss of appetite: no  Pain:            no                    location:          Review of Systems: [All others negative]    MEDICATIONS  (STANDING):  amLODIPine   Tablet 10 milliGRAM(s) Oral daily  ascorbic acid 500 milliGRAM(s) Oral daily  aspirin  chewable 81 milliGRAM(s) Oral daily  cefepime   IVPB 2000 milliGRAM(s) IV Intermittent every 12 hours  chlorhexidine 2% Cloths 1 Application(s) Topical <User Schedule>  dexAMETHasone  Injectable 6 milliGRAM(s) IV Push daily  doxycycline IVPB 100 milliGRAM(s) IV Intermittent every 12 hours  doxycycline IVPB      heparin  Infusion. 750 Unit(s)/Hr (7.5 mL/Hr) IV Continuous <Continuous>  insulin glargine Injectable (LANTUS) 15 Unit(s) SubCutaneous at bedtime  insulin lispro (ADMELOG) corrective regimen sliding scale   SubCutaneous four times a day before meals  metoprolol tartrate 25 milliGRAM(s) Oral every 12 hours  multivitamin 1 Tablet(s) Oral daily  pantoprazole    Tablet 40 milliGRAM(s) Oral before breakfast  remdesivir  IVPB 100 milliGRAM(s) IV Intermittent every 24 hours  remdesivir  IVPB   IV Intermittent   simvastatin 40 milliGRAM(s) Oral at bedtime    MEDICATIONS  (PRN):  acetaminophen     Tablet .. 650 milliGRAM(s) Oral every 6 hours PRN Temp greater or equal to 38C (100.4F), Mild Pain (1 - 3)  albuterol    90 MICROgram(s) HFA Inhaler 1 Puff(s) Inhalation every 4 hours PRN Shortness of Breath and/or Wheezing  benzonatate 100 milliGRAM(s) Oral three times a day PRN Cough  guaiFENesin  milliGRAM(s) Oral every 12 hours PRN Cough  heparin   Injectable 6500 Unit(s) IV Push every 6 hours PRN For aPTT less than 40  heparin   Injectable 3000 Unit(s) IV Push every 6 hours PRN For aPTT between 40 - 57      PHYSICAL EXAM:    Vital Signs Last 24 Hrs  T(C): 36.9 (30 Nov 2023 12:00), Max: 36.9 (30 Nov 2023 05:00)  T(F): 98.5 (30 Nov 2023 12:00), Max: 98.5 (30 Nov 2023 05:00)  HR: 60 (30 Nov 2023 14:00) (60 - 97)  BP: 106/60 (30 Nov 2023 14:00) (101/55 - 126/63)  BP(mean): 74 (30 Nov 2023 14:00) (69 - 111)  RR: 19 (30 Nov 2023 14:00) (11 - 23)  SpO2: 97% (30 Nov 2023 14:00) (82% - 98%)    Parameters below as of 30 Nov 2023 08:00  Patient On (Oxygen Delivery Method): nasal cannula  O2 Flow (L/min): 5      General: alert  oriented x 3,  pleasant, conversant      Karnofsky Performance Score/Palliative Performance Status Version2:   70  %  PPSV: 70%  HEENT: n/c, a/t  , poor dentition   Lungs: rhonchi upper lobes, moist prod cough ,   CV: normal  rate   GI: abd lrg., n/t    : normal prima fit   Musculoskeletal: normal , marte's , no  edema   baseline  ambulatory    Skin: normal , w/d   Neuro: no deficits, pt awake, alert, oriented x 3 , fully conversant   Oral intake ability:  full capability   Diet: reg as artemio     LABS:                        8.9    9.39  )-----------( 124      ( 30 Nov 2023 09:50 )             27.2     11-30    138  |  101  |  45<H>  ----------------------------<  226<H>  5.1   |  27  |  2.11<H>    Ca    9.4      30 Nov 2023 05:30  Phos  3.5     11-30  Mg     2.0     11-30    TPro  7.0  /  Alb  2.9<L>  /  TBili  0.4  /  DBili  x   /  AST  93<H>  /  ALT  29  /  AlkPhos  43  11-30    Urinalysis Basic - ( 30 Nov 2023 05:30 )    Color: x / Appearance: x / SG: x / pH: x  Gluc: 226 mg/dL / Ketone: x  / Bili: x / Urobili: x   Blood: x / Protein: x / Nitrite: x   Leuk Esterase: x / RBC: x / WBC x   Sq Epi: x / Non Sq Epi: x / Bacteria: x        RADIOLOGY & ADDITIONAL STUDIES: < from: Xray Chest 1 View- PORTABLE-Urgent (11.29.23 @ 14:13) >    PROCEDURE DATE:  11/29/2023          INTERPRETATION:  An AP portable chest x-ray was performed for clinical   concern of pneumonia.    There are no prior studies for comparison.    There is an infiltrate in the left lower lobe, consistent with pneumonia.   The remainder the exam is notable for marginally prominent cardiac   silhouette with AV sequential pacemaker and central pulmonary venous   engorgement but without bharati pulmonary edema. There is no pneumothorax.   There is no pleural effusion. No other hilar or mediastinal abnormality   is seen. There are degenerative changes of the spine and shoulders.    IMPRESSION:  1. Left lower lobe infiltrate is suspicious for pneumonia.  2. The cardiac silhouette is marginally prominent with AV sequential   pacemaker and central pulmonary venous engorgement, but without bharati   pulmonary edema.        ADVANCE DIRECTIVES: full code   Advanced Care Planning discussion total time spent:   HPI: 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, type 2 DM, right lung mass who presented to ED today via EMS from home with history of progressive SOB associated with non-productive cough over the past several days. Denies chest pain, fevers, chills, nausea, abdominal pain, lightheadedness, dizziness, palpitations, irregular HR, sick contacts        PAST MEDICAL & SURGICAL HISTORY:  HTN (hypertension)      Aortic stenosis      Type 2 diabetes mellitus      Lung mass      Dyslipidemia      History of transcatheter aortic valve replacement (TAVR)      History of inguinal hernia repair, bilateral      History of appendectomy          SOCIAL HISTORY:    Admitted from:  home    Substance abuse history:              Tobacco hx:                  Alcohol hx:              Home Opioid hx:  [place]:                                    Preferred Language:    Surrogate/HCP/:     Wife Malika        Phone#: [phone]    alt : john Bethea [phone]    FAMILY HISTORY:  No significant family history      Baseline ADLs (prior to admission):    Allergies    No Known Allergies    Intolerances      Present Symptoms:   Dyspnea:   no, better  Nausea/Vomiting:   Anxiety:  Depressed   Fatigue: no  Loss of appetite: no  Pain:            no                    location:          Review of Systems: [All others negative]    MEDICATIONS  (STANDING):  amLODIPine   Tablet 10 milliGRAM(s) Oral daily  ascorbic acid 500 milliGRAM(s) Oral daily  aspirin  chewable 81 milliGRAM(s) Oral daily  cefepime   IVPB 2000 milliGRAM(s) IV Intermittent every 12 hours  chlorhexidine 2% Cloths 1 Application(s) Topical <User Schedule>  dexAMETHasone  Injectable 6 milliGRAM(s) IV Push daily  doxycycline IVPB 100 milliGRAM(s) IV Intermittent every 12 hours  doxycycline IVPB      heparin  Infusion. 750 Unit(s)/Hr (7.5 mL/Hr) IV Continuous <Continuous>  insulin glargine Injectable (LANTUS) 15 Unit(s) SubCutaneous at bedtime  insulin lispro (ADMELOG) corrective regimen sliding scale   SubCutaneous four times a day before meals  metoprolol tartrate 25 milliGRAM(s) Oral every 12 hours  multivitamin 1 Tablet(s) Oral daily  pantoprazole    Tablet 40 milliGRAM(s) Oral before breakfast  remdesivir  IVPB 100 milliGRAM(s) IV Intermittent every 24 hours  remdesivir  IVPB   IV Intermittent   simvastatin 40 milliGRAM(s) Oral at bedtime    MEDICATIONS  (PRN):  acetaminophen     Tablet .. 650 milliGRAM(s) Oral every 6 hours PRN Temp greater or equal to 38C (100.4F), Mild Pain (1 - 3)  albuterol    90 MICROgram(s) HFA Inhaler 1 Puff(s) Inhalation every 4 hours PRN Shortness of Breath and/or Wheezing  benzonatate 100 milliGRAM(s) Oral three times a day PRN Cough  guaiFENesin  milliGRAM(s) Oral every 12 hours PRN Cough  heparin   Injectable 6500 Unit(s) IV Push every 6 hours PRN For aPTT less than 40  heparin   Injectable 3000 Unit(s) IV Push every 6 hours PRN For aPTT between 40 - 57      PHYSICAL EXAM:    Vital Signs Last 24 Hrs  T(C): 36.9 (30 Nov 2023 12:00), Max: 36.9 (30 Nov 2023 05:00)  T(F): 98.5 (30 Nov 2023 12:00), Max: 98.5 (30 Nov 2023 05:00)  HR: 60 (30 Nov 2023 14:00) (60 - 97)  BP: 106/60 (30 Nov 2023 14:00) (101/55 - 126/63)  BP(mean): 74 (30 Nov 2023 14:00) (69 - 111)  RR: 19 (30 Nov 2023 14:00) (11 - 23)  SpO2: 97% (30 Nov 2023 14:00) (82% - 98%)    Parameters below as of 30 Nov 2023 08:00  Patient On (Oxygen Delivery Method): nasal cannula  O2 Flow (L/min): 5      General: alert  oriented x 3,  pleasant, conversant      Karnofsky Performance Score/Palliative Performance Status Version2:   70  %  PPSV: 70%  HEENT: n/c, a/t  , poor dentition   Lungs: rhonchi upper lobes, moist prod cough ,   CV: normal  rate   GI: abd lrg., n/t    : normal prima fit   Musculoskeletal: normal , marte's , no  edema   baseline  ambulatory    Skin: normal , w/d   Neuro: no deficits, pt awake, alert, oriented x 3 , fully conversant   Oral intake ability:  full capability   Diet: reg as artemio     LABS:                        8.9    9.39  )-----------( 124      ( 30 Nov 2023 09:50 )             27.2     11-30    138  |  101  |  45<H>  ----------------------------<  226<H>  5.1   |  27  |  2.11<H>    Ca    9.4      30 Nov 2023 05:30  Phos  3.5     11-30  Mg     2.0     11-30    TPro  7.0  /  Alb  2.9<L>  /  TBili  0.4  /  DBili  x   /  AST  93<H>  /  ALT  29  /  AlkPhos  43  11-30    Urinalysis Basic - ( 30 Nov 2023 05:30 )    Color: x / Appearance: x / SG: x / pH: x  Gluc: 226 mg/dL / Ketone: x  / Bili: x / Urobili: x   Blood: x / Protein: x / Nitrite: x   Leuk Esterase: x / RBC: x / WBC x   Sq Epi: x / Non Sq Epi: x / Bacteria: x        RADIOLOGY & ADDITIONAL STUDIES: < from: Xray Chest 1 View- PORTABLE-Urgent (11.29.23 @ 14:13) >    PROCEDURE DATE:  11/29/2023          INTERPRETATION:  An AP portable chest x-ray was performed for clinical   concern of pneumonia.    There are no prior studies for comparison.    There is an infiltrate in the left lower lobe, consistent with pneumonia.   The remainder the exam is notable for marginally prominent cardiac   silhouette with AV sequential pacemaker and central pulmonary venous   engorgement but without bharati pulmonary edema. There is no pneumothorax.   There is no pleural effusion. No other hilar or mediastinal abnormality   is seen. There are degenerative changes of the spine and shoulders.    IMPRESSION:  1. Left lower lobe infiltrate is suspicious for pneumonia.  2. The cardiac silhouette is marginally prominent with AV sequential   pacemaker and central pulmonary venous engorgement, but without bharati   pulmonary edema.        ADVANCE DIRECTIVES: full code   Advanced Care Planning discussion total time spent:   HPI: 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, type 2 DM, right lung mass who presented to ED today via EMS from home with history of progressive SOB associated with non-productive cough over the past several days. Denies chest pain, fevers, chills, nausea, abdominal pain, lightheadedness, dizziness, palpitations, irregular HR, sick contacts        PAST MEDICAL & SURGICAL HISTORY:  HTN (hypertension)      Aortic stenosis      Type 2 diabetes mellitus      Lung mass      Dyslipidemia      History of transcatheter aortic valve replacement (TAVR)      History of inguinal hernia repair, bilateral      History of appendectomy          SOCIAL HISTORY:    Admitted from:  home    Substance abuse history:              Tobacco hx:                  Alcohol hx:              Home Opioid hx:  [place]:                                    Preferred Language:    Surrogate/HCP/:     Wife Malika        Phone#: [phone]    alt : john Bethea [phone]    FAMILY HISTORY:  No significant family history      Baseline ADLs (prior to admission):    Allergies    No Known Allergies    Intolerances      Present Symptoms:   Dyspnea:   no, better  Nausea/Vomiting:   Anxiety:  Depressed   Fatigue: no  Loss of appetite: no  Pain:            no                    location:          Review of Systems: [All others negative]    MEDICATIONS  (STANDING):  amLODIPine   Tablet 10 milliGRAM(s) Oral daily  ascorbic acid 500 milliGRAM(s) Oral daily  aspirin  chewable 81 milliGRAM(s) Oral daily  cefepime   IVPB 2000 milliGRAM(s) IV Intermittent every 12 hours  chlorhexidine 2% Cloths 1 Application(s) Topical <User Schedule>  dexAMETHasone  Injectable 6 milliGRAM(s) IV Push daily  doxycycline IVPB 100 milliGRAM(s) IV Intermittent every 12 hours  doxycycline IVPB      heparin  Infusion. 750 Unit(s)/Hr (7.5 mL/Hr) IV Continuous <Continuous>  insulin glargine Injectable (LANTUS) 15 Unit(s) SubCutaneous at bedtime  insulin lispro (ADMELOG) corrective regimen sliding scale   SubCutaneous four times a day before meals  metoprolol tartrate 25 milliGRAM(s) Oral every 12 hours  multivitamin 1 Tablet(s) Oral daily  pantoprazole    Tablet 40 milliGRAM(s) Oral before breakfast  remdesivir  IVPB 100 milliGRAM(s) IV Intermittent every 24 hours  remdesivir  IVPB   IV Intermittent   simvastatin 40 milliGRAM(s) Oral at bedtime    MEDICATIONS  (PRN):  acetaminophen     Tablet .. 650 milliGRAM(s) Oral every 6 hours PRN Temp greater or equal to 38C (100.4F), Mild Pain (1 - 3)  albuterol    90 MICROgram(s) HFA Inhaler 1 Puff(s) Inhalation every 4 hours PRN Shortness of Breath and/or Wheezing  benzonatate 100 milliGRAM(s) Oral three times a day PRN Cough  guaiFENesin  milliGRAM(s) Oral every 12 hours PRN Cough  heparin   Injectable 6500 Unit(s) IV Push every 6 hours PRN For aPTT less than 40  heparin   Injectable 3000 Unit(s) IV Push every 6 hours PRN For aPTT between 40 - 57      PHYSICAL EXAM:    Vital Signs Last 24 Hrs  T(C): 36.9 (30 Nov 2023 12:00), Max: 36.9 (30 Nov 2023 05:00)  T(F): 98.5 (30 Nov 2023 12:00), Max: 98.5 (30 Nov 2023 05:00)  HR: 60 (30 Nov 2023 14:00) (60 - 97)  BP: 106/60 (30 Nov 2023 14:00) (101/55 - 126/63)  BP(mean): 74 (30 Nov 2023 14:00) (69 - 111)  RR: 19 (30 Nov 2023 14:00) (11 - 23)  SpO2: 97% (30 Nov 2023 14:00) (82% - 98%)    Parameters below as of 30 Nov 2023 08:00  Patient On (Oxygen Delivery Method): nasal cannula  O2 Flow (L/min): 5      General: alert  oriented x 3,  pleasant, conversant      Karnofsky Performance Score/Palliative Performance Status Version2:   70  %  PPSV: 70%  HEENT: n/c, a/t  , poor dentition   Lungs: rhonchi upper lobes, moist prod cough ,   CV: normal  rate   GI: abd lrg., n/t    : normal prima fit   Musculoskeletal: normal , marte's , no  edema   baseline  ambulatory    Skin: normal , w/d   Neuro: no deficits, pt awake, alert, oriented x 3 , fully conversant   Oral intake ability:  full capability   Diet: reg as artemio     LABS:                        8.9    9.39  )-----------( 124      ( 30 Nov 2023 09:50 )             27.2     11-30    138  |  101  |  45<H>  ----------------------------<  226<H>  5.1   |  27  |  2.11<H>    Ca    9.4      30 Nov 2023 05:30  Phos  3.5     11-30  Mg     2.0     11-30    TPro  7.0  /  Alb  2.9<L>  /  TBili  0.4  /  DBili  x   /  AST  93<H>  /  ALT  29  /  AlkPhos  43  11-30    Urinalysis Basic - ( 30 Nov 2023 05:30 )    Color: x / Appearance: x / SG: x / pH: x  Gluc: 226 mg/dL / Ketone: x  / Bili: x / Urobili: x   Blood: x / Protein: x / Nitrite: x   Leuk Esterase: x / RBC: x / WBC x   Sq Epi: x / Non Sq Epi: x / Bacteria: x        RADIOLOGY & ADDITIONAL STUDIES: < from: Xray Chest 1 View- PORTABLE-Urgent (11.29.23 @ 14:13) >    PROCEDURE DATE:  11/29/2023          INTERPRETATION:  An AP portable chest x-ray was performed for clinical   concern of pneumonia.    There are no prior studies for comparison.    There is an infiltrate in the left lower lobe, consistent with pneumonia.   The remainder the exam is notable for marginally prominent cardiac   silhouette with AV sequential pacemaker and central pulmonary venous   engorgement but without bharati pulmonary edema. There is no pneumothorax.   There is no pleural effusion. No other hilar or mediastinal abnormality   is seen. There are degenerative changes of the spine and shoulders.    IMPRESSION:  1. Left lower lobe infiltrate is suspicious for pneumonia.  2. The cardiac silhouette is marginally prominent with AV sequential   pacemaker and central pulmonary venous engorgement, but without bharati   pulmonary edema.        ADVANCE DIRECTIVES: full code   Advanced Care Planning discussion total time spent:

## 2023-11-30 NOTE — PROGRESS NOTE ADULT - ASSESSMENT
Assessment:  Alexandra Pena is an 83 year old man with past medical history of TAVR and Hypertension who was brought in by EMS due to progressive cough and shortness of breath, found to have COVID-19 and NSTEMI.    The patient's family is present at bedside to provide additional history as patient is on BiPAP. The patient denies chest pain at this time but has had increasing cough and progressive dyspnea. ECG consistent with atrial sensed, ventricular paced rhythm. Troponins significantly elevated and echocardiogram consistent with mildly reduced LVEF 45-50% with apical and mid inferior hypokinesis, may be secondary to Type I NSTEMI vs myocarditis from COVID infection, troponin also may be further elevated from renal dysfunction. Pro BNP significantly elevated and CXR consistent with left lower lobe pneumonia.    Recommendations:  [] COVID-19 pneumonia: S/p BiPAP. tx for COVID as per primary team.  [] NSTEMI: heparin drip for 48 hrs total for possible ACS. s/p Aspirin 325 mg x1, continue Aspirin 81 mg daily. Continue home beta blocker. Continue to trend troponin until it peaks, 7121>9050. Continue to monitor on telemetry. Pt would benefit from ischemic eval once acute medical issues stable. May also need evaluation for VTE.    The patient follows with cardiologist, Dr. Haley at [facility].     Pamela TAPIA-BC Assessment:  Alexandra Pena is an 83 year old man with past medical history of TAVR and Hypertension who was brought in by EMS due to progressive cough and shortness of breath, found to have COVID-19 and NSTEMI.    The patient's family is present at bedside to provide additional history as patient is on BiPAP. The patient denies chest pain at this time but has had increasing cough and progressive dyspnea. ECG consistent with atrial sensed, ventricular paced rhythm. Troponins significantly elevated and echocardiogram consistent with mildly reduced LVEF 45-50% with apical and mid inferior hypokinesis, may be secondary to Type I NSTEMI vs myocarditis from COVID infection, troponin also may be further elevated from renal dysfunction. Pro BNP significantly elevated and CXR consistent with left lower lobe pneumonia.    Recommendations:  [] COVID-19 pneumonia: S/p BiPAP. tx for COVID as per primary team.  [] NSTEMI: heparin drip for 48 hrs total for possible ACS. s/p Aspirin 325 mg x1, continue Aspirin 81 mg daily. Continue home beta blocker. Continue to trend troponin until it peaks, 7121>9050. Continue to monitor on telemetry. Pt would benefit from ischemic eval once acute medical issues stable. May also need evaluation for VTE.    The patient follows with cardiologist, Dr. Haley at [facility].     Pamela TAPIA-BC Assessment:  Alexandra Pena is an 83 year old man with past medical history of TAVR and Hypertension who was brought in by EMS due to progressive cough and shortness of breath, found to have COVID-19 and NSTEMI.    The patient's family is present at bedside to provide additional history as patient is on BiPAP. The patient denies chest pain at this time but has had increasing cough and progressive dyspnea. ECG consistent with atrial sensed, ventricular paced rhythm. Troponins significantly elevated and echocardiogram consistent with mildly reduced LVEF 45-50% with apical and mid inferior hypokinesis, may be secondary to Type I NSTEMI vs myocarditis from COVID infection, troponin also may be further elevated from renal dysfunction. Pro BNP significantly elevated and CXR consistent with left lower lobe pneumonia.    Recommendations:  [] COVID-19 pneumonia: S/p BiPAP. tx for COVID as per primary team.  [] NSTEMI: heparin drip for 48 hrs total for possible ACS. s/p Aspirin 325 mg x1, continue Aspirin 81 mg daily. Continue home beta blocker. Continue to trend troponin until it peaks, 7121>9050. Continue to monitor on telemetry. Pt would benefit from ischemic eval once acute medical issues stable. May also need evaluation for VTE.    The patient follows with cardiologist, Dr. Haley at [facility].     Pamela TAPIA-BC Assessment:  Alexandra Pena is an 83 year old man with past medical history of TAVR and Hypertension who was brought in by EMS due to progressive cough and shortness of breath, found to have COVID-19 and NSTEMI.    The patient's family is present at bedside to provide additional history as patient is on BiPAP. The patient denies chest pain at this time but has had increasing cough and progressive dyspnea. ECG consistent with atrial sensed, ventricular paced rhythm. Troponins significantly elevated and echocardiogram consistent with mildly reduced LVEF 45-50% with apical and mid inferior hypokinesis, may be secondary to Type I NSTEMI vs myocarditis from COVID infection, troponin also may be further elevated from renal dysfunction. Pro BNP significantly elevated and CXR consistent with left lower lobe pneumonia.    Recommendations:  [] COVID-19 pneumonia: S/p BiPAP. tx for COVID as per primary team.  [] NSTEMI: heparin drip for 48 hrs total for possible ACS. s/p Aspirin 325 mg x1, continue Aspirin 81 mg daily. Continue home beta blocker. Continue to trend troponin until it peaks, 7121>9050. Continue to monitor on telemetry. Pt would benefit from ischemic eval once acute medical issues stable. May also need evaluation for VTE.    Pt with elevating proBNP, s/p IV lasix x 1. Consider diuresis as kidney function allows. Renal consult awaiting.     The patient follows with cardiologist, Dr. Haley at [facility].     Pamela TAPIA-BC Assessment:  Alexandra Pena is an 83 year old man with past medical history of TAVR and Hypertension who was brought in by EMS due to progressive cough and shortness of breath, found to have COVID-19 and NSTEMI.    The patient's family is present at bedside to provide additional history as patient is on BiPAP. The patient denies chest pain at this time but has had increasing cough and progressive dyspnea. ECG consistent with atrial sensed, ventricular paced rhythm. Troponins significantly elevated and echocardiogram consistent with mildly reduced LVEF 45-50% with apical and mid inferior hypokinesis, may be secondary to Type I NSTEMI vs myocarditis from COVID infection, troponin also may be further elevated from renal dysfunction. Pro BNP significantly elevated and CXR consistent with left lower lobe pneumonia.    Recommendations:  [] COVID-19 pneumonia: S/p BiPAP. tx for COVID as per primary team.  [] NSTEMI: heparin drip for 48 hrs total for possible ACS. s/p Aspirin 325 mg x1, continue Aspirin 81 mg daily. Continue home beta blocker. Continue to trend troponin until it peaks, 7121>9050. Continue to monitor on telemetry. Pt would benefit from ischemic eval once acute medical issues stable. May also need evaluation for VTE.    Pt with elevating proBNP, s/p IV lasix x 1. Consider diuresis as kidney function allows. Renal consult awaiting.     The patient follows with cardiologist, Dr. Haley at [facility].     Pamela TAPIA-BC Assessment:  Alexandra Pena is an 83 year old man with past medical history of TAVR and Hypertension who was brought in by EMS due to progressive cough and shortness of breath, found to have COVID-19 and NSTEMI.    The patient's family is present at bedside to provide additional history as patient is on BiPAP. The patient denies chest pain at this time but has had increasing cough and progressive dyspnea. ECG consistent with atrial sensed, ventricular paced rhythm. Troponins significantly elevated and echocardiogram consistent with mildly reduced LVEF 45-50% with apical and mid inferior hypokinesis, may be secondary to Type I NSTEMI vs myocarditis from COVID infection, troponin also may be further elevated from renal dysfunction. Pro BNP significantly elevated and CXR consistent with left lower lobe pneumonia.    Recommendations:  [] COVID-19 pneumonia: S/p BiPAP. tx for COVID as per primary team.  [] NSTEMI: heparin drip for 48 hrs total for possible ACS. s/p Aspirin 325 mg x1, continue Aspirin 81 mg daily. Continue home beta blocker. Continue to trend troponin until it peaks, 7121>9050. Continue to monitor on telemetry. Pt would benefit from ischemic eval once acute medical issues stable. May also need evaluation for VTE.    Pt with elevating proBNP, s/p IV lasix x 1. Consider diuresis as kidney function allows. Renal consult awaiting.     The patient follows with cardiologist, Dr. Haley at [facility].     Pamela TAPIA-BC Assessment:  Alexandra Pena is an 83 year old man with past medical history of TAVR and Hypertension who was brought in by EMS due to progressive cough and shortness of breath, found to have COVID-19 and NSTEMI.    The patient's family is present at bedside to provide additional history as patient is on BiPAP. The patient denies chest pain at this time but has had increasing cough and progressive dyspnea. ECG consistent with atrial sensed, ventricular paced rhythm. Troponins significantly elevated and echocardiogram consistent with mildly reduced LVEF 45-50% with apical and mid inferior hypokinesis, may be secondary to Type I NSTEMI vs myocarditis from COVID infection, troponin also may be further elevated from renal dysfunction. Pro BNP significantly elevated and CXR consistent with left lower lobe pneumonia.    Recommendations:  [] COVID-19 pneumonia: S/p BiPAP. tx for COVID as per primary team.  [] NSTEMI: heparin drip for 48 hrs total for possible ACS. s/p Aspirin 325 mg x1, continue Aspirin 81 mg daily. Continue home beta blocker. Continue to trend troponin until it peaks, 7121>9050. Continue to monitor on telemetry. Pt would benefit from ischemic eval once acute medical issues stable. May also need evaluation for VTE.    Pt with elevating proBNP, s/p IV lasix x 1. Consider diuresis as kidney function allows. Renal consult awaiting.     The patient follows with cardiologist, Dr. Haley at [facility].     Pamela Stringer [facility]    cardio attending   discussed comorbidities including probable CHF  BNP 26770 with a recent need for BIPAP in the setting of renal failure severe anemia and COVID. EF 2/11/23 50-55 now currently EF 45-50 represents and interval decrease in EF  tn 4400/4700//[id number] nstemi which may have been triggered by COVID.  differential include ACS vs myocarditis favor the former given regional wall motion abnormalities on echo. high risk for cardiac cath given renal failure c 2.1 and anemia. hb 8.5. all routes remain hazardous       care is coordinated with dr Bearden and daughter at bedside dr harrison and ms stringer.  family friend/cousing dr mills [facility].   office await [facility] call back  Assessment:  Alexandra Pena is an 83 year old man with past medical history of TAVR and Hypertension who was brought in by EMS due to progressive cough and shortness of breath, found to have COVID-19 and NSTEMI.    The patient's family is present at bedside to provide additional history as patient is on BiPAP. The patient denies chest pain at this time but has had increasing cough and progressive dyspnea. ECG consistent with atrial sensed, ventricular paced rhythm. Troponins significantly elevated and echocardiogram consistent with mildly reduced LVEF 45-50% with apical and mid inferior hypokinesis, may be secondary to Type I NSTEMI vs myocarditis from COVID infection, troponin also may be further elevated from renal dysfunction. Pro BNP significantly elevated and CXR consistent with left lower lobe pneumonia.    Recommendations:  [] COVID-19 pneumonia: S/p BiPAP. tx for COVID as per primary team.  [] NSTEMI: heparin drip for 48 hrs total for possible ACS. s/p Aspirin 325 mg x1, continue Aspirin 81 mg daily. Continue home beta blocker. Continue to trend troponin until it peaks, 7121>9050. Continue to monitor on telemetry. Pt would benefit from ischemic eval once acute medical issues stable. May also need evaluation for VTE.    Pt with elevating proBNP, s/p IV lasix x 1. Consider diuresis as kidney function allows. Renal consult awaiting.     The patient follows with cardiologist, Dr. Haley at [facility].     Pamela Stringer [facility]    cardio attending   discussed comorbidities including probable CHF  BNP 76980 with a recent need for BIPAP in the setting of renal failure severe anemia and COVID. EF 2/11/23 50-55 now currently EF 45-50 represents and interval decrease in EF  tn 4400/[id number]//[id number] nstemi which may have been triggered by COVID.  differential include ACS vs myocarditis favor the former given regional wall motion abnormalities on echo. high risk for cardiac cath given renal failure c 2.1 and anemia. hb 8.5. all routes remain hazardous       care is coordinated with dr Bearden and daughter at bedside dr harrison and ms stringer.  family friend/cousing dr mills [facility].   office await [facility] call back  Assessment:  Alexandra Pena is an 83 year old man with past medical history of TAVR and Hypertension who was brought in by EMS due to progressive cough and shortness of breath, found to have COVID-19 and NSTEMI.    The patient's family is present at bedside to provide additional history as patient is on BiPAP. The patient denies chest pain at this time but has had increasing cough and progressive dyspnea. ECG consistent with atrial sensed, ventricular paced rhythm. Troponins significantly elevated and echocardiogram consistent with mildly reduced LVEF 45-50% with apical and mid inferior hypokinesis, may be secondary to Type I NSTEMI vs myocarditis from COVID infection, troponin also may be further elevated from renal dysfunction. Pro BNP significantly elevated and CXR consistent with left lower lobe pneumonia.    Recommendations:  [] COVID-19 pneumonia: S/p BiPAP. tx for COVID as per primary team.  [] NSTEMI: heparin drip for 48 hrs total for possible ACS. s/p Aspirin 325 mg x1, continue Aspirin 81 mg daily. Continue home beta blocker. Continue to trend troponin until it peaks, 7121>9050. Continue to monitor on telemetry. Pt would benefit from ischemic eval once acute medical issues stable. May also need evaluation for VTE.    Pt with elevating proBNP, s/p IV lasix x 1. Consider diuresis as kidney function allows. Renal consult awaiting.     The patient follows with cardiologist, Dr. Haley at [facility].     Pamela Stringer [facility]    cardio attending   discussed comorbidities including probable CHF  BNP 83394 with a recent need for BIPAP in the setting of renal failure severe anemia and COVID. EF 2/11/23 50-55 now currently EF 45-50 represents and interval decrease in EF  tn 4400/4700//[id number] nstemi which may have been triggered by COVID.  differential include ACS vs myocarditis favor the former given regional wall motion abnormalities on echo. high risk for cardiac cath given renal failure c 2.1 and anemia. hb 8.5. all routes remain hazardous       care is coordinated with dr Bearden and daughter at bedside dr harrison and ms stringer.  family friend/cousing dr mills [facility].   office await [facility] call back  Assessment:  Alexandra Pena is an 83 year old man with past medical history of TAVR and Hypertension who was brought in by EMS due to progressive cough and shortness of breath, found to have COVID-19 and NSTEMI.    The patient's family is present at bedside to provide additional history as patient is on BiPAP. The patient denies chest pain at this time but has had increasing cough and progressive dyspnea. ECG consistent with atrial sensed, ventricular paced rhythm. Troponins significantly elevated and echocardiogram consistent with mildly reduced LVEF 45-50% with apical and mid inferior hypokinesis, may be secondary to Type I NSTEMI vs myocarditis from COVID infection, troponin also may be further elevated from renal dysfunction. Pro BNP significantly elevated and CXR consistent with left lower lobe pneumonia.    Recommendations:  [] COVID-19 pneumonia: S/p BiPAP. tx for COVID as per primary team.  [] NSTEMI: heparin drip for 48 hrs total for possible ACS. s/p Aspirin 325 mg x1, continue Aspirin 81 mg daily. Continue home beta blocker. Continue to trend troponin until it peaks, 7121>9050. Continue to monitor on telemetry. Pt would benefit from ischemic eval once acute medical issues stable. May also need evaluation for VTE.    Pt with elevating proBNP, s/p IV lasix x 1. Consider diuresis as kidney function allows. Renal consult awaiting.     The patient follows with cardiologist, Dr. Haley at [facility].     Pamela Stringer [facility]    cardio attending   discussed comorbidities including probable CHF  BNP 55322 with a recent need for BIPAP in the setting of renal failure severe anemia and COVID. EF 2/11/23 50-55 now currently EF 45-50 represents and interval decrease in EF  tn 4400/4700//7121cw nstemi which may have been triggered by COVID.  differential include ACS vs myocarditis favor the former given regional wall motion abnormalities on echo. high risk for cardiac cath given renal failure c 2.1 and anemia. hb 8.5. all routes remain hazardous       care is coordinated with dr Bearden and daughter at bedside dr harrison and ms stringer.  family friend/cousin dr lina olivarez.   office await [facility] call back     discussed with dr bearden. he will arrange for [facility] transfer and evaluation given multiple comorbidities.  Assessment:  Alexandra Pena is an 83 year old man with past medical history of TAVR and Hypertension who was brought in by EMS due to progressive cough and shortness of breath, found to have COVID-19 and NSTEMI.    The patient's family is present at bedside to provide additional history as patient is on BiPAP. The patient denies chest pain at this time but has had increasing cough and progressive dyspnea. ECG consistent with atrial sensed, ventricular paced rhythm. Troponins significantly elevated and echocardiogram consistent with mildly reduced LVEF 45-50% with apical and mid inferior hypokinesis, may be secondary to Type I NSTEMI vs myocarditis from COVID infection, troponin also may be further elevated from renal dysfunction. Pro BNP significantly elevated and CXR consistent with left lower lobe pneumonia.    Recommendations:  [] COVID-19 pneumonia: S/p BiPAP. tx for COVID as per primary team.  [] NSTEMI: heparin drip for 48 hrs total for possible ACS. s/p Aspirin 325 mg x1, continue Aspirin 81 mg daily. Continue home beta blocker. Continue to trend troponin until it peaks, 7121>9050. Continue to monitor on telemetry. Pt would benefit from ischemic eval once acute medical issues stable. May also need evaluation for VTE.    Pt with elevating proBNP, s/p IV lasix x 1. Consider diuresis as kidney function allows. Renal consult awaiting.     The patient follows with cardiologist, Dr. Haley at [facility].     Pamela Stringer [facility]    cardio attending   discussed comorbidities including probable CHF  BNP 11914 with a recent need for BIPAP in the setting of renal failure severe anemia and COVID. EF 2/11/23 50-55 now currently EF 45-50 represents and interval decrease in EF  tn 4400/4700//7121cw nstemi which may have been triggered by COVID.  differential include ACS vs myocarditis favor the former given regional wall motion abnormalities on echo. high risk for cardiac cath given renal failure c 2.1 and anemia. hb 8.5. all routes remain hazardous       care is coordinated with dr Bearden and daughter at bedside dr harrison and ms stringer.  family friend/cousin dr lina olivarez.   office await [facility] call back     discussed with dr bearden. he will arrange for [facility] transfer and evaluation given multiple comorbidities.  Assessment:  Alexandra Pena is an 83 year old man with past medical history of TAVR and Hypertension who was brought in by EMS due to progressive cough and shortness of breath, found to have COVID-19 and NSTEMI.    The patient's family is present at bedside to provide additional history as patient is on BiPAP. The patient denies chest pain at this time but has had increasing cough and progressive dyspnea. ECG consistent with atrial sensed, ventricular paced rhythm. Troponins significantly elevated and echocardiogram consistent with mildly reduced LVEF 45-50% with apical and mid inferior hypokinesis, may be secondary to Type I NSTEMI vs myocarditis from COVID infection, troponin also may be further elevated from renal dysfunction. Pro BNP significantly elevated and CXR consistent with left lower lobe pneumonia.    Recommendations:  [] COVID-19 pneumonia: S/p BiPAP. tx for COVID as per primary team.  [] NSTEMI: heparin drip for 48 hrs total for possible ACS. s/p Aspirin 325 mg x1, continue Aspirin 81 mg daily. Continue home beta blocker. Continue to trend troponin until it peaks, 7121>9050. Continue to monitor on telemetry. Pt would benefit from ischemic eval once acute medical issues stable. May also need evaluation for VTE.    Pt with elevating proBNP, s/p IV lasix x 1. Consider diuresis as kidney function allows. Renal consult awaiting.     The patient follows with cardiologist, Dr. Haley at [facility].     Pamela Stringer [facility]    cardio attending   discussed comorbidities including probable CHF  BNP 17199 with a recent need for BIPAP in the setting of renal failure severe anemia and COVID. EF 2/11/23 50-55 now currently EF 45-50 represents and interval decrease in EF  tn 4400/4700//[id number] nstemi which may have been triggered by COVID.  differential include ACS vs myocarditis favor the former given regional wall motion abnormalities on echo. high risk for cardiac cath given renal failure c 2.1 and anemia. hb 8.5. all routes remain hazardous       care is coordinated with dr Bearden and daughter at bedside dr harrison and ms stringer.  family friend/cousin dr lina olivarez.   office await [facility] call back     discussed with dr bearden. he will arrange for [facility] transfer and evaluation given multiple comorbidities.

## 2023-11-30 NOTE — PROGRESS NOTE ADULT - ASSESSMENT
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     Pulm:  - Continue Decadron for enhanced anti-inflammatory effect  - Actively titrating nasal cannula settings to maintain SpO2 > 92%  - NIV PRN  - Incentive spirometry as able  - Albuterol PRN  - Tessalon Perles for cough  - Mucinex for congestion                  GI:  - DASH/TLC    Renal:  - Even to net negative fluid balance as tolerated by hemodynamics and renal fx.    - Continue to monitor Bun/Cr and UOP  - Replacing electrolytes as needed with Goal K> 4, PO> 3, Mg> 2               - Strict I&O's  - Avoid Nephro toxic medication  - Renally dose meds    Heme:  - Heparin gtt for NSTEMI    ID:  - Jhony/Decadron for Covid, Trend renal function with Jhony use  - Cefepime for PNA, will add Doxy for Atypical coverage given prolonged QTC  - Microbiology and Radiology reviewed   - trend CBC with diff, CMP  and fever curve    Endo:  - ISS for aggressive glycemic control to limit FS glucose to < 180mg/dl.    COVID 19 specific considerations and therapeutic options based on the available and rapidly changing literature    Critical Care Time (EXCLUSIVE of any non bundled procedures) :  34 minutes were spent assessing the patient's presenting problems of acute illness that pose a high probability of life threatening  deterioration or end organ damage / dysfunction.  Medical desicion making includes initiation / continuation of plan or care review data/ labwork/ radiographic study, direct patient care bedside ,  discussions with  consultants regarding care,  evaluation and interpretation of vital signs, any necessary ventilator management,   NIV or BIPAP changes  or initiation,    discussions with multidisipliary team,  am or pm rounds, discussions of goals of care with patient and family all non-inclusive of procedures.    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     Pulm:  - Continue Decadron for enhanced anti-inflammatory effect  - Actively titrating nasal cannula settings to maintain SpO2 > 92%  - NIV PRN  - Incentive spirometry as able  - Albuterol PRN  - Tessalon Perles for cough  - Mucinex for congestion                  GI:  - DASH/TLC    Renal:  - Even to net negative fluid balance as tolerated by hemodynamics and renal fx.    - Continue to monitor Bun/Cr and UOP  - Replacing electrolytes as needed with Goal K> 4, PO> 3, Mg> 2               - Strict I&O's  - Avoid Nephro toxic medication  - Renally dose meds    Heme:  - Heparin gtt for NSTEMI    ID:  - Jhony/Decadron for Covid, Trend renal function with Jhony use  - Cefepime for PNA, will add Doxy for Atypical coverage given prolonged QTC  - Microbiology and Radiology reviewed   - trend CBC with diff, CMP  and fever curve    Endo:  - ISS for aggressive glycemic control to limit FS glucose to < 180mg/dl.    COVID 19 specific considerations and therapeutic options based on the available and rapidly changing literature    Critical Care Time (EXCLUSIVE of any non bundled procedures) :  34 minutes were spent assessing the patient's presenting problems of acute illness that pose a high probability of life threatening  deterioration or end organ damage / dysfunction.  Medical desicion making includes initiation / continuation of plan or care review data/ labwork/ radiographic study, direct patient care bedside ,  discussions with  consultants regarding care,  evaluation and interpretation of vital signs, any necessary ventilator management,   NIV or BIPAP changes  or initiation,    discussions with multidisipliary team,  am or pm rounds, discussions of goals of care with patient and family all non-inclusive of procedures.    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 transfer and evaluation given multiple comorbidities.     Pulm:  - Continue Decadron for enhanced anti-inflammatory effect  - Actively titrating nasal cannula settings to maintain SpO2 > 92%  - NIV PRN  - Incentive spirometry as able  - Albuterol PRN  - Tessalon Perles for cough  - Mucinex for congestion                  GI:  - DASH/TLC    Renal:  - Even to net negative fluid balance as tolerated by hemodynamics and renal fx.    - Continue to monitor Bun/Cr and UOP  - Replacing electrolytes as needed with Goal K> 4, PO> 3, Mg> 2               - Strict I&O's  - Avoid Nephro toxic medication  - Renally dose meds    Heme:  - Heparin gtt for NSTEMI    ID:  - Jhony/Decadron for Covid, Trend renal function with Jhony use  - Cefepime for PNA, will add Doxy for Atypical coverage given prolonged QTC  - Microbiology and Radiology reviewed   - trend CBC with diff, CMP  and fever curve    Endo:  - ISS for aggressive glycemic control to limit FS glucose to < 180mg/dl.    COVID 19 specific considerations and therapeutic options based on the available and rapidly changing literature    Critical Care Time (EXCLUSIVE of any non bundled procedures) :  34 minutes were spent assessing the patient's presenting problems of acute illness that pose a high probability of life threatening  deterioration or end organ damage / dysfunction.  Medical desicion making includes initiation / continuation of plan or care review data/ labwork/ radiographic study, direct patient care bedside ,  discussions with  consultants regarding care,  evaluation and interpretation of vital signs, any necessary ventilator management,   NIV or BIPAP changes  or initiation,    discussions with multidisipliary team,  am or pm rounds, discussions of goals of care with patient and family all non-inclusive of procedures.    Date of entry of this note is equal to the date of services rendered

## 2023-11-30 NOTE — DIETITIAN INITIAL EVALUATION ADULT - PROBLEM SELECTOR PLAN 1
likely multifactorial from COVID, CAP, and acute decompensated systolic heart failure in the setting of NSTEMI.        Cardiology recommendations appreciated, start heparin IV per nomogram with ASA, statin and beta blocker.  Hold ACE/ARB with elevated creatinine.  Trend EKG and troponin.  EF reduced from last year, strict intake and outputs, trend BNP. wean from supplemental oxygen as tolerated.  Start COVID treatment per current recommendations with remdesivir and high dose steroids, monitor liver function.

## 2023-11-30 NOTE — PROGRESS NOTE ADULT - SUBJECTIVE AND OBJECTIVE BOX
Patient is a 83y old  Male who presents with a chief complaint of Acute on chronic systolic congestive heart failure     (2023 15:52)      BRIEF HOSPITAL COURSE: 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, type 2 DM, right lung mass who presented to ED today via EMS from home with history of progressive SOB associated with non-productive cough over the past several days. Found to be in AHRF 2/2 COVID and LLL PNA, NSTEMI with New Systolic HF    Events last 24 hours: Off bipap remains on nc in [facility]    PAST MEDICAL & SURGICAL HISTORY:  HTN (hypertension)      Aortic stenosis      Type 2 diabetes mellitus      Lung mass      Dyslipidemia      History of transcatheter aortic valve replacement (TAVR)      History of inguinal hernia repair, bilateral      History of appendectomy          Review of Systems:  CONSTITUTIONAL: No fever, chills, or fatigue.  EYES: No eye pain, visual disturbances, or discharge.  ENMT:  No difficulty hearing, tinnitus, or vertigo. No sinus or throat pain.  NECK: No pain or stiffness.  RESPIRATORY: No shortness of breath, cough, or wheezing.  CARDIOVASCULAR: No chest pain, palpitations, dizziness, or leg swelling.  GASTROINTESTINAL: No abdominal or epigastric pain. No nausea, vomiting, diarrhea, or constipation. No hematemesis, melena, or hematochezia.  GENITOURINARY: No dysuria, increased frequency, hematuria, or incontinence.  NEUROLOGICAL: No headaches, memory loss, loss of strength, numbness, or tremors.  SKIN: No itching, burning, rashes, or lesions.  MUSCULOSKELETAL: No joint pain or swelling. No muscle, back, or extremity pain.  PSYCHIATRIC: No depression, anxiety, mood swings, or difficulty sleeping.    Medications:  cefepime   IVPB 2000 milliGRAM(s) IV Intermittent every 12 hours  doxycycline IVPB      doxycycline IVPB 100 milliGRAM(s) IV Intermittent every 12 hours  remdesivir  IVPB   IV Intermittent   remdesivir  IVPB 100 milliGRAM(s) IV Intermittent every 24 hours    metoprolol tartrate 25 milliGRAM(s) Oral every 12 hours    albuterol    90 MICROgram(s) HFA Inhaler 1 Puff(s) Inhalation every 4 hours PRN  benzonatate 100 milliGRAM(s) Oral three times a day PRN  guaiFENesin  milliGRAM(s) Oral every 12 hours PRN    acetaminophen     Tablet .. 650 milliGRAM(s) Oral every 6 hours PRN      aspirin  chewable 81 milliGRAM(s) Oral daily  heparin   Injectable 6500 Unit(s) IV Push every 6 hours PRN  heparin   Injectable 3000 Unit(s) IV Push every 6 hours PRN  heparin  Infusion. 750 Unit(s)/Hr IV Continuous <Continuous>    pantoprazole    Tablet 40 milliGRAM(s) Oral before breakfast      dexAMETHasone  Injectable 6 milliGRAM(s) IV Push daily  insulin glargine Injectable (LANTUS) 15 Unit(s) SubCutaneous at bedtime  insulin lispro (ADMELOG) corrective regimen sliding scale   SubCutaneous four times a day before meals  simvastatin 40 milliGRAM(s) Oral at bedtime    multivitamin 1 Tablet(s) Oral daily      chlorhexidine 2% Cloths 1 Application(s) Topical <User Schedule>            ICU Vital Signs Last 24 Hrs  T(C): 36.8 (2023 16:00), Max: 36.9 (2023 05:00)  T(F): 98.3 (2023 16:00), Max: 98.5 (2023 05:00)  HR: 70 (2023 20:00) (60 - 92)  BP: 92/47 (2023 20:00) (87/43 - 124/102)  BP(mean): 62 (2023 20:00) (56 - 111)  ABP: --  ABP(mean): --  RR: 21 (2023 20:00) (11 - 23)  SpO2: 100% (2023 20:00) (82% - 100%)    O2 Parameters below as of 2023 19:00  Patient On (Oxygen Delivery Method): nasal cannula, 5L nasal cannula                I&O's Detail    2023 07:01  -  2023 07:00  --------------------------------------------------------  IN:    Heparin Infusion: 50 mL    Heparin Infusion: 30 mL  Total IN: 80 mL    OUT:  Total OUT: 0 mL    Total NET: 80 mL      2023 07:01  -  2023 21:54  --------------------------------------------------------  IN:    Heparin Infusion: 117.5 mL    IV PiggyBack: 100 mL    IV PiggyBack: 50 mL  Total IN: 267.5 mL    OUT:    Incontinent per Condom Catheter (mL): 250 mL  Total OUT: 250 mL    Total NET: 17.5 mL          LABS:                        8.9    9.39  )-----------( 124      ( 2023 09:50 )             27.2         138  |  101  |  45<H>  ----------------------------<  226<H>  5.1   |  27  |  2.11<H>    Ca    9.4      2023 05:30  Phos  3.5       Mg     2.0         TPro  7.0  /  Alb  2.9<L>  /  TBili  0.4  /  DBili  x   /  AST  93<H>  /  ALT  29  /  AlkPhos  43  30          CAPILLARY BLOOD GLUCOSE      POCT Blood Glucose.: 265 mg/dL (2023 21:22)    PT/INR - ( 2023 05:30 )   PT: 15.5 sec;   INR: 1.37 ratio         PTT - ( 2023 17:15 )  PTT:80.5 sec  Urinalysis Basic - ( 2023 17:15 )    Color: Yellow / Appearance: Clear / S.021 / pH: x  Gluc: x / Ketone: Trace mg/dL  / Bili: Negative / Urobili: 0.2 mg/dL   Blood: x / Protein: 30 mg/dL / Nitrite: Negative   Leuk Esterase: Negative / RBC: 0 /HPF / WBC 1 /HPF   Sq Epi: x / Non Sq Epi: x / Bacteria: Few /HPF      CULTURES:  Rapid RVP Result: Detected ( @ 14:20)      Physical Examination:    General: Well appearing, lying in bed in NAD.      HEENT: Pupils equal, reactive to light. Symmetric. No scleral icterus or injection.    PULM: Clear to auscultation B/L. No wheezes, rales, or rhonchi apprecaited. No significant sputum production or increased respiratory effort.    NECK: Supple, no lymphadenopathy, trachea midline.    CVS: Regular rate and rhythm, no murmurs appreciated, +s1/s2.    ABD: Soft, nondistended, nontender, normoactive bowel sounds.    EXT: No edema, nontender.    SKIN: Warm and well perfused, no rashes noted.    NEURO: Alert, oriented, interactive, nonfocal.    RADIOLOGY: < from: Xray Chest 1 View- PORTABLE-Urgent (23 @ 14:13) >    ACC: [id number] EXAM:  XR CHEST PORTABLE URGENT 1V   ORDERED BY: ANGELIKA RAMOS     PROCEDURE DATE:  2023          INTERPRETATION:  An AP portable chest x-ray was performed for clinical   concern of pneumonia.    There are no prior studies for comparison.    There is an infiltrate in the left lower lobe, consistent with pneumonia.   The remainder the exam is notable for marginally prominent cardiac   silhouette with AV sequential pacemaker and central pulmonary venous   engorgement but without bharati pulmonary edema. There is no pneumothorax.   There is no pleural effusion. No other hilar or mediastinal abnormality   is seen. There are degenerative changes of the spine and shoulders.    IMPRESSION:  1. Left lower lobe infiltrate is suspicious for pneumonia.  2. The cardiac silhouette is marginally prominent with AV sequential   pacemaker and central pulmonary venous engorgement, but without bharati   pulmonary edema.    --- End of Report ---            TINY LU MD; Attending Radiologist  This document has been electronically signed. 2023  2:30PM    < end of copied text >     Patient is a 83y old  Male who presents with a chief complaint of Acute on chronic systolic congestive heart failure     (2023 15:52)      BRIEF HOSPITAL COURSE: 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, type 2 DM, right lung mass who presented to ED today via EMS from home with history of progressive SOB associated with non-productive cough over the past several days. Found to be in AHRF 2/2 COVID and LLL PNA, NSTEMI with New Systolic HF    Events last 24 hours: Off bipap remains on nc in [facility]    PAST MEDICAL & SURGICAL HISTORY:  HTN (hypertension)      Aortic stenosis      Type 2 diabetes mellitus      Lung mass      Dyslipidemia      History of transcatheter aortic valve replacement (TAVR)      History of inguinal hernia repair, bilateral      History of appendectomy          Review of Systems:  CONSTITUTIONAL: No fever, chills, or fatigue.  EYES: No eye pain, visual disturbances, or discharge.  ENMT:  No difficulty hearing, tinnitus, or vertigo. No sinus or throat pain.  NECK: No pain or stiffness.  RESPIRATORY: No shortness of breath, cough, or wheezing.  CARDIOVASCULAR: No chest pain, palpitations, dizziness, or leg swelling.  GASTROINTESTINAL: No abdominal or epigastric pain. No nausea, vomiting, diarrhea, or constipation. No hematemesis, melena, or hematochezia.  GENITOURINARY: No dysuria, increased frequency, hematuria, or incontinence.  NEUROLOGICAL: No headaches, memory loss, loss of strength, numbness, or tremors.  SKIN: No itching, burning, rashes, or lesions.  MUSCULOSKELETAL: No joint pain or swelling. No muscle, back, or extremity pain.  PSYCHIATRIC: No depression, anxiety, mood swings, or difficulty sleeping.    Medications:  cefepime   IVPB 2000 milliGRAM(s) IV Intermittent every 12 hours  doxycycline IVPB      doxycycline IVPB 100 milliGRAM(s) IV Intermittent every 12 hours  remdesivir  IVPB   IV Intermittent   remdesivir  IVPB 100 milliGRAM(s) IV Intermittent every 24 hours    metoprolol tartrate 25 milliGRAM(s) Oral every 12 hours    albuterol    90 MICROgram(s) HFA Inhaler 1 Puff(s) Inhalation every 4 hours PRN  benzonatate 100 milliGRAM(s) Oral three times a day PRN  guaiFENesin  milliGRAM(s) Oral every 12 hours PRN    acetaminophen     Tablet .. 650 milliGRAM(s) Oral every 6 hours PRN      aspirin  chewable 81 milliGRAM(s) Oral daily  heparin   Injectable 6500 Unit(s) IV Push every 6 hours PRN  heparin   Injectable 3000 Unit(s) IV Push every 6 hours PRN  heparin  Infusion. 750 Unit(s)/Hr IV Continuous <Continuous>    pantoprazole    Tablet 40 milliGRAM(s) Oral before breakfast      dexAMETHasone  Injectable 6 milliGRAM(s) IV Push daily  insulin glargine Injectable (LANTUS) 15 Unit(s) SubCutaneous at bedtime  insulin lispro (ADMELOG) corrective regimen sliding scale   SubCutaneous four times a day before meals  simvastatin 40 milliGRAM(s) Oral at bedtime    multivitamin 1 Tablet(s) Oral daily      chlorhexidine 2% Cloths 1 Application(s) Topical <User Schedule>            ICU Vital Signs Last 24 Hrs  T(C): 36.8 (2023 16:00), Max: 36.9 (2023 05:00)  T(F): 98.3 (2023 16:00), Max: 98.5 (2023 05:00)  HR: 70 (2023 20:00) (60 - 92)  BP: 92/47 (2023 20:00) (87/43 - 124/102)  BP(mean): 62 (2023 20:00) (56 - 111)  ABP: --  ABP(mean): --  RR: 21 (2023 20:00) (11 - 23)  SpO2: 100% (2023 20:00) (82% - 100%)    O2 Parameters below as of 2023 19:00  Patient On (Oxygen Delivery Method): nasal cannula, 5L nasal cannula                I&O's Detail    2023 07:01  -  2023 07:00  --------------------------------------------------------  IN:    Heparin Infusion: 50 mL    Heparin Infusion: 30 mL  Total IN: 80 mL    OUT:  Total OUT: 0 mL    Total NET: 80 mL      2023 07:01  -  2023 21:54  --------------------------------------------------------  IN:    Heparin Infusion: 117.5 mL    IV PiggyBack: 100 mL    IV PiggyBack: 50 mL  Total IN: 267.5 mL    OUT:    Incontinent per Condom Catheter (mL): 250 mL  Total OUT: 250 mL    Total NET: 17.5 mL          LABS:                        8.9    9.39  )-----------( 124      ( 2023 09:50 )             27.2         138  |  101  |  45<H>  ----------------------------<  226<H>  5.1   |  27  |  2.11<H>    Ca    9.4      2023 05:30  Phos  3.5       Mg     2.0         TPro  7.0  /  Alb  2.9<L>  /  TBili  0.4  /  DBili  x   /  AST  93<H>  /  ALT  29  /  AlkPhos  43  30          CAPILLARY BLOOD GLUCOSE      POCT Blood Glucose.: 265 mg/dL (2023 21:22)    PT/INR - ( 2023 05:30 )   PT: 15.5 sec;   INR: 1.37 ratio         PTT - ( 2023 17:15 )  PTT:80.5 sec  Urinalysis Basic - ( 2023 17:15 )    Color: Yellow / Appearance: Clear / S.021 / pH: x  Gluc: x / Ketone: Trace mg/dL  / Bili: Negative / Urobili: 0.2 mg/dL   Blood: x / Protein: 30 mg/dL / Nitrite: Negative   Leuk Esterase: Negative / RBC: 0 /HPF / WBC 1 /HPF   Sq Epi: x / Non Sq Epi: x / Bacteria: Few /HPF      CULTURES:  Rapid RVP Result: Detected ( @ 14:20)      Physical Examination:    General: Well appearing, lying in bed in NAD.      HEENT: Pupils equal, reactive to light. Symmetric. No scleral icterus or injection.    PULM: Clear to auscultation B/L. No wheezes, rales, or rhonchi apprecaited. No significant sputum production or increased respiratory effort.    NECK: Supple, no lymphadenopathy, trachea midline.    CVS: Regular rate and rhythm, no murmurs appreciated, +s1/s2.    ABD: Soft, nondistended, nontender, normoactive bowel sounds.    EXT: No edema, nontender.    SKIN: Warm and well perfused, no rashes noted.    NEURO: Alert, oriented, interactive, nonfocal.    RADIOLOGY: < from: Xray Chest 1 View- PORTABLE-Urgent (23 @ 14:13) >    ACC: [id number] EXAM:  XR CHEST PORTABLE URGENT 1V   ORDERED BY: ANGELIKA RAMOS     PROCEDURE DATE:  2023          INTERPRETATION:  An AP portable chest x-ray was performed for clinical   concern of pneumonia.    There are no prior studies for comparison.    There is an infiltrate in the left lower lobe, consistent with pneumonia.   The remainder the exam is notable for marginally prominent cardiac   silhouette with AV sequential pacemaker and central pulmonary venous   engorgement but without bharati pulmonary edema. There is no pneumothorax.   There is no pleural effusion. No other hilar or mediastinal abnormality   is seen. There are degenerative changes of the spine and shoulders.    IMPRESSION:  1. Left lower lobe infiltrate is suspicious for pneumonia.  2. The cardiac silhouette is marginally prominent with AV sequential   pacemaker and central pulmonary venous engorgement, but without bharati   pulmonary edema.    --- End of Report ---            TINY LU MD; Attending Radiologist  This document has been electronically signed. 2023  2:30PM    < end of copied text >     Patient is a 83y old  Male who presents with a chief complaint of Acute on chronic systolic congestive heart failure     (2023 15:52)      BRIEF HOSPITAL COURSE: 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, type 2 DM, right lung mass who presented to ED today via EMS from home with history of progressive SOB associated with non-productive cough over the past several days. Found to be in AHRF 2/2 COVID and LLL PNA, NSTEMI with New Systolic HF    Events last 24 hours: Off bipap remains on nc in [facility]    PAST MEDICAL & SURGICAL HISTORY:  HTN (hypertension)      Aortic stenosis      Type 2 diabetes mellitus      Lung mass      Dyslipidemia      History of transcatheter aortic valve replacement (TAVR)      History of inguinal hernia repair, bilateral      History of appendectomy          Review of Systems:  CONSTITUTIONAL: No fever, chills, or fatigue.  EYES: No eye pain, visual disturbances, or discharge.  ENMT:  No difficulty hearing, tinnitus, or vertigo. No sinus or throat pain.  NECK: No pain or stiffness.  RESPIRATORY: No shortness of breath, cough, or wheezing.  CARDIOVASCULAR: No chest pain, palpitations, dizziness, or leg swelling.  GASTROINTESTINAL: No abdominal or epigastric pain. No nausea, vomiting, diarrhea, or constipation. No hematemesis, melena, or hematochezia.  GENITOURINARY: No dysuria, increased frequency, hematuria, or incontinence.  NEUROLOGICAL: No headaches, memory loss, loss of strength, numbness, or tremors.  SKIN: No itching, burning, rashes, or lesions.  MUSCULOSKELETAL: No joint pain or swelling. No muscle, back, or extremity pain.  PSYCHIATRIC: No depression, anxiety, mood swings, or difficulty sleeping.    Medications:  cefepime   IVPB 2000 milliGRAM(s) IV Intermittent every 12 hours  doxycycline IVPB      doxycycline IVPB 100 milliGRAM(s) IV Intermittent every 12 hours  remdesivir  IVPB   IV Intermittent   remdesivir  IVPB 100 milliGRAM(s) IV Intermittent every 24 hours    metoprolol tartrate 25 milliGRAM(s) Oral every 12 hours    albuterol    90 MICROgram(s) HFA Inhaler 1 Puff(s) Inhalation every 4 hours PRN  benzonatate 100 milliGRAM(s) Oral three times a day PRN  guaiFENesin  milliGRAM(s) Oral every 12 hours PRN    acetaminophen     Tablet .. 650 milliGRAM(s) Oral every 6 hours PRN      aspirin  chewable 81 milliGRAM(s) Oral daily  heparin   Injectable 6500 Unit(s) IV Push every 6 hours PRN  heparin   Injectable 3000 Unit(s) IV Push every 6 hours PRN  heparin  Infusion. 750 Unit(s)/Hr IV Continuous <Continuous>    pantoprazole    Tablet 40 milliGRAM(s) Oral before breakfast      dexAMETHasone  Injectable 6 milliGRAM(s) IV Push daily  insulin glargine Injectable (LANTUS) 15 Unit(s) SubCutaneous at bedtime  insulin lispro (ADMELOG) corrective regimen sliding scale   SubCutaneous four times a day before meals  simvastatin 40 milliGRAM(s) Oral at bedtime    multivitamin 1 Tablet(s) Oral daily      chlorhexidine 2% Cloths 1 Application(s) Topical <User Schedule>            ICU Vital Signs Last 24 Hrs  T(C): 36.8 (2023 16:00), Max: 36.9 (2023 05:00)  T(F): 98.3 (2023 16:00), Max: 98.5 (2023 05:00)  HR: 70 (2023 20:00) (60 - 92)  BP: 92/47 (2023 20:00) (87/43 - 124/102)  BP(mean): 62 (2023 20:00) (56 - 111)  ABP: --  ABP(mean): --  RR: 21 (2023 20:00) (11 - 23)  SpO2: 100% (2023 20:00) (82% - 100%)    O2 Parameters below as of 2023 19:00  Patient On (Oxygen Delivery Method): nasal cannula, 5L nasal cannula                I&O's Detail    2023 07:01  -  2023 07:00  --------------------------------------------------------  IN:    Heparin Infusion: 50 mL    Heparin Infusion: 30 mL  Total IN: 80 mL    OUT:  Total OUT: 0 mL    Total NET: 80 mL      2023 07:01  -  2023 21:54  --------------------------------------------------------  IN:    Heparin Infusion: 117.5 mL    IV PiggyBack: 100 mL    IV PiggyBack: 50 mL  Total IN: 267.5 mL    OUT:    Incontinent per Condom Catheter (mL): 250 mL  Total OUT: 250 mL    Total NET: 17.5 mL          LABS:                        8.9    9.39  )-----------( 124      ( 2023 09:50 )             27.2         138  |  101  |  45<H>  ----------------------------<  226<H>  5.1   |  27  |  2.11<H>    Ca    9.4      2023 05:30  Phos  3.5       Mg     2.0         TPro  7.0  /  Alb  2.9<L>  /  TBili  0.4  /  DBili  x   /  AST  93<H>  /  ALT  29  /  AlkPhos  43  30          CAPILLARY BLOOD GLUCOSE      POCT Blood Glucose.: 265 mg/dL (2023 21:22)    PT/INR - ( 2023 05:30 )   PT: 15.5 sec;   INR: 1.37 ratio         PTT - ( 2023 17:15 )  PTT:80.5 sec  Urinalysis Basic - ( 2023 17:15 )    Color: Yellow / Appearance: Clear / S.021 / pH: x  Gluc: x / Ketone: Trace mg/dL  / Bili: Negative / Urobili: 0.2 mg/dL   Blood: x / Protein: 30 mg/dL / Nitrite: Negative   Leuk Esterase: Negative / RBC: 0 /HPF / WBC 1 /HPF   Sq Epi: x / Non Sq Epi: x / Bacteria: Few /HPF      CULTURES:  Rapid RVP Result: Detected ( @ 14:20)      Physical Examination:    General: Well appearing, lying in bed in NAD.      HEENT: Pupils equal, reactive to light. Symmetric. No scleral icterus or injection.    PULM: Clear to auscultation B/L. No wheezes, rales, or rhonchi apprecaited. No significant sputum production or increased respiratory effort.    NECK: Supple, no lymphadenopathy, trachea midline.    CVS: Regular rate and rhythm, no murmurs appreciated, +s1/s2.    ABD: Soft, nondistended, nontender, normoactive bowel sounds.    EXT: No edema, nontender.    SKIN: Warm and well perfused, no rashes noted.    NEURO: Alert, oriented, interactive, nonfocal.    RADIOLOGY: < from: Xray Chest 1 View- PORTABLE-Urgent (23 @ 14:13) >    ACC: [id number] EXAM:  XR CHEST PORTABLE URGENT 1V   ORDERED BY: ANGELIKA RAMOS     PROCEDURE DATE:  2023          INTERPRETATION:  An AP portable chest x-ray was performed for clinical   concern of pneumonia.    There are no prior studies for comparison.    There is an infiltrate in the left lower lobe, consistent with pneumonia.   The remainder the exam is notable for marginally prominent cardiac   silhouette with AV sequential pacemaker and central pulmonary venous   engorgement but without bharati pulmonary edema. There is no pneumothorax.   There is no pleural effusion. No other hilar or mediastinal abnormality   is seen. There are degenerative changes of the spine and shoulders.    IMPRESSION:  1. Left lower lobe infiltrate is suspicious for pneumonia.  2. The cardiac silhouette is marginally prominent with AV sequential   pacemaker and central pulmonary venous engorgement, but without bharati   pulmonary edema.    --- End of Report ---            TINY LU MD; Attending Radiologist  This document has been electronically signed. 2023  2:30PM    < end of copied text >

## 2023-11-30 NOTE — CONSULT NOTE ADULT - SUBJECTIVE AND OBJECTIVE BOX
NEPHROLOGY CONSULTATION    CHIEF COMPLAINT: SOB    HPI:  Pt is 82 yo m with PMH dyslipidemia, HTN, status post TAVR on Eliquis, type 2 DM, R lung mass who presented to ED yesterday via EMS from home with progressive SOB associated with non-productive cough over the past several days. No chest pain, fevers, chills, nausea, abdominal pain, lightheadedness, dizziness, palpitations, irregular HR, sick contacts. Noted to have elevated troponin, ALEXI. Tested positive for COVID.     ROS:  as above    Allergies:  No Known Allergies    PAST MEDICAL & SURGICAL HISTORY:  HTN (hypertension)  Aortic stenosis  Type 2 diabetes mellitus  Lung mass  Dyslipidemia  History of transcatheter aortic valve replacement (TAVR)  History of inguinal hernia repair, bilateral  History of appendectomy    SOCIAL HISTORY:  negative    FAMILY HISTORY:  No significant family history    MEDICATIONS  (STANDING):  amLODIPine   Tablet 10 milliGRAM(s) Oral daily  ascorbic acid 500 milliGRAM(s) Oral daily  aspirin  chewable 81 milliGRAM(s) Oral daily  cefepime   IVPB 2000 milliGRAM(s) IV Intermittent every 12 hours  chlorhexidine 2% Cloths 1 Application(s) Topical <User Schedule>  dexAMETHasone  Injectable 6 milliGRAM(s) IV Push daily  doxycycline IVPB      doxycycline IVPB 100 milliGRAM(s) IV Intermittent every 12 hours  heparin  Infusion. 750 Unit(s)/Hr (7.5 mL/Hr) IV Continuous <Continuous>  insulin glargine Injectable (LANTUS) 15 Unit(s) SubCutaneous at bedtime  insulin lispro (ADMELOG) corrective regimen sliding scale   SubCutaneous four times a day before meals  metoprolol tartrate 25 milliGRAM(s) Oral every 12 hours  multivitamin 1 Tablet(s) Oral daily  pantoprazole    Tablet 40 milliGRAM(s) Oral before breakfast  remdesivir  IVPB   IV Intermittent   remdesivir  IVPB 100 milliGRAM(s) IV Intermittent every 24 hours  simvastatin 40 milliGRAM(s) Oral at bedtime    Vital Signs Last 24 Hrs  T(C): 36.9 (11-30-23 @ 12:00), Max: 36.9 (11-30-23 @ 05:00)  T(F): 98.5 (11-30-23 @ 12:00), Max: 98.5 (11-30-23 @ 05:00)  HR: 60 (11-30-23 @ 14:00) (60 - 97)  BP: 106/60 (11-30-23 @ 14:00) (101/55 - 126/63)  BP(mean): 74 (11-30-23 @ 14:00) (69 - 111)  RR: 19 (11-30-23 @ 14:00) (11 - 23)  SpO2: 97% (11-30-23 @ 14:00) (82% - 98%)    LABS:                        8.9    9.39  )-----------( 124      ( 30 Nov 2023 09:50 )             27.2     11-30    138  |  101  |  45<H>  ----------------------------<  226<H>  5.1   |  27  |  2.11<H>    Ca    9.4      30 Nov 2023 05:30  Phos  3.5     11-30  Mg     2.0     11-30    TPro  7.0  /  Alb  2.9<L>  /  TBili  0.4  /  DBili  x   /  AST  93<H>  /  ALT  29  /  AlkPhos  43  11-30    LIVER FUNCTIONS - ( 30 Nov 2023 05:30 )  Alb: 2.9 g/dL / Pro: 7.0 g/dL / ALK PHOS: 43 U/L / ALT: 29 U/L / AST: 93 U/L / GGT: x           PT/INR - ( 30 Nov 2023 05:30 )   PT: 15.5 sec;   INR: 1.37 ratio      PTT - ( 30 Nov 2023 09:50 )  PTT:54.7 sec    A/P:    full consult to follow    [phone]         NEPHROLOGY CONSULTATION    CHIEF COMPLAINT: SOB    HPI:  Pt is 82 yo m with PMH dyslipidemia, HTN, status post TAVR on Eliquis, type 2 DM, R lung mass who presented to ED yesterday via EMS from home with progressive SOB associated with non-productive cough over the past several days. No chest pain, fevers, chills, nausea, abdominal pain, lightheadedness, dizziness, palpitations, irregular HR, sick contacts. Noted to have elevated troponin, ALEXI. Tested positive for COVID.     ROS:  as above    Allergies:  No Known Allergies    PAST MEDICAL & SURGICAL HISTORY:  HTN (hypertension)  Aortic stenosis  Type 2 diabetes mellitus  Lung mass  Dyslipidemia  History of transcatheter aortic valve replacement (TAVR)  History of inguinal hernia repair, bilateral  History of appendectomy    SOCIAL HISTORY:  negative    FAMILY HISTORY:  No significant family history    MEDICATIONS  (STANDING):  amLODIPine   Tablet 10 milliGRAM(s) Oral daily  ascorbic acid 500 milliGRAM(s) Oral daily  aspirin  chewable 81 milliGRAM(s) Oral daily  cefepime   IVPB 2000 milliGRAM(s) IV Intermittent every 12 hours  chlorhexidine 2% Cloths 1 Application(s) Topical <User Schedule>  dexAMETHasone  Injectable 6 milliGRAM(s) IV Push daily  doxycycline IVPB      doxycycline IVPB 100 milliGRAM(s) IV Intermittent every 12 hours  heparin  Infusion. 750 Unit(s)/Hr (7.5 mL/Hr) IV Continuous <Continuous>  insulin glargine Injectable (LANTUS) 15 Unit(s) SubCutaneous at bedtime  insulin lispro (ADMELOG) corrective regimen sliding scale   SubCutaneous four times a day before meals  metoprolol tartrate 25 milliGRAM(s) Oral every 12 hours  multivitamin 1 Tablet(s) Oral daily  pantoprazole    Tablet 40 milliGRAM(s) Oral before breakfast  remdesivir  IVPB   IV Intermittent   remdesivir  IVPB 100 milliGRAM(s) IV Intermittent every 24 hours  simvastatin 40 milliGRAM(s) Oral at bedtime    Vital Signs Last 24 Hrs  T(C): 36.9 (11-30-23 @ 12:00), Max: 36.9 (11-30-23 @ 05:00)  T(F): 98.5 (11-30-23 @ 12:00), Max: 98.5 (11-30-23 @ 05:00)  HR: 60 (11-30-23 @ 14:00) (60 - 97)  BP: 106/60 (11-30-23 @ 14:00) (101/55 - 126/63)  BP(mean): 74 (11-30-23 @ 14:00) (69 - 111)  RR: 19 (11-30-23 @ 14:00) (11 - 23)  SpO2: 97% (11-30-23 @ 14:00) (82% - 98%)    LABS:                        8.9    9.39  )-----------( 124      ( 30 Nov 2023 09:50 )             27.2     11-30    138  |  101  |  45<H>  ----------------------------<  226<H>  5.1   |  27  |  2.11<H>    Ca    9.4      30 Nov 2023 05:30  Phos  3.5     11-30  Mg     2.0     11-30    TPro  7.0  /  Alb  2.9<L>  /  TBili  0.4  /  DBili  x   /  AST  93<H>  /  ALT  29  /  AlkPhos  43  11-30    LIVER FUNCTIONS - ( 30 Nov 2023 05:30 )  Alb: 2.9 g/dL / Pro: 7.0 g/dL / ALK PHOS: 43 U/L / ALT: 29 U/L / AST: 93 U/L / GGT: x           PT/INR - ( 30 Nov 2023 05:30 )   PT: 15.5 sec;   INR: 1.37 ratio      PTT - ( 30 Nov 2023 09:50 )  PTT:54.7 sec    A/P:    full consult to follow    [phone]         NEPHROLOGY CONSULTATION    CHIEF COMPLAINT: SOB    HPI:  Pt is 84 yo m with PMH dyslipidemia, HTN, status post TAVR on Eliquis, type 2 DM, R lung mass who presented to ED yesterday via EMS from home with progressive SOB associated with non-productive cough over the past several days. No chest pain, fevers, chills, nausea, abdominal pain, lightheadedness, dizziness, palpitations, irregular HR, sick contacts. Noted to have elevated troponin, ALEXI. Tested positive for COVID.     ROS:  as above    Allergies:  No Known Allergies    PAST MEDICAL & SURGICAL HISTORY:  HTN (hypertension)  Aortic stenosis  Type 2 diabetes mellitus  Lung mass  Dyslipidemia  History of transcatheter aortic valve replacement (TAVR)  History of inguinal hernia repair, bilateral  History of appendectomy    SOCIAL HISTORY:  negative    FAMILY HISTORY:  No significant family history    MEDICATIONS  (STANDING):  amLODIPine   Tablet 10 milliGRAM(s) Oral daily  ascorbic acid 500 milliGRAM(s) Oral daily  aspirin  chewable 81 milliGRAM(s) Oral daily  cefepime   IVPB 2000 milliGRAM(s) IV Intermittent every 12 hours  chlorhexidine 2% Cloths 1 Application(s) Topical <User Schedule>  dexAMETHasone  Injectable 6 milliGRAM(s) IV Push daily  doxycycline IVPB      doxycycline IVPB 100 milliGRAM(s) IV Intermittent every 12 hours  heparin  Infusion. 750 Unit(s)/Hr (7.5 mL/Hr) IV Continuous <Continuous>  insulin glargine Injectable (LANTUS) 15 Unit(s) SubCutaneous at bedtime  insulin lispro (ADMELOG) corrective regimen sliding scale   SubCutaneous four times a day before meals  metoprolol tartrate 25 milliGRAM(s) Oral every 12 hours  multivitamin 1 Tablet(s) Oral daily  pantoprazole    Tablet 40 milliGRAM(s) Oral before breakfast  remdesivir  IVPB   IV Intermittent   remdesivir  IVPB 100 milliGRAM(s) IV Intermittent every 24 hours  simvastatin 40 milliGRAM(s) Oral at bedtime    Vital Signs Last 24 Hrs  T(C): 36.9 (11-30-23 @ 12:00), Max: 36.9 (11-30-23 @ 05:00)  T(F): 98.5 (11-30-23 @ 12:00), Max: 98.5 (11-30-23 @ 05:00)  HR: 60 (11-30-23 @ 14:00) (60 - 97)  BP: 106/60 (11-30-23 @ 14:00) (101/55 - 126/63)  BP(mean): 74 (11-30-23 @ 14:00) (69 - 111)  RR: 19 (11-30-23 @ 14:00) (11 - 23)  SpO2: 97% (11-30-23 @ 14:00) (82% - 98%)    LABS:                        8.9    9.39  )-----------( 124      ( 30 Nov 2023 09:50 )             27.2     11-30    138  |  101  |  45<H>  ----------------------------<  226<H>  5.1   |  27  |  2.11<H>    Ca    9.4      30 Nov 2023 05:30  Phos  3.5     11-30  Mg     2.0     11-30    TPro  7.0  /  Alb  2.9<L>  /  TBili  0.4  /  DBili  x   /  AST  93<H>  /  ALT  29  /  AlkPhos  43  11-30    LIVER FUNCTIONS - ( 30 Nov 2023 05:30 )  Alb: 2.9 g/dL / Pro: 7.0 g/dL / ALK PHOS: 43 U/L / ALT: 29 U/L / AST: 93 U/L / GGT: x           PT/INR - ( 30 Nov 2023 05:30 )   PT: 15.5 sec;   INR: 1.37 ratio      PTT - ( 30 Nov 2023 09:50 )  PTT:54.7 sec    A/P:    full consult to follow    [phone]         NEPHROLOGY CONSULTATION    CHIEF COMPLAINT: SOB    HPI:  Pt is 84 yo m with PMH dyslipidemia, HTN, status post TAVR on Eliquis, type 2 DM, R lung mass who presented to ED yesterday via EMS from home with progressive SOB associated with non-productive cough over the past several days. No chest pain, fevers, chills, nausea, abdominal pain, lightheadedness, dizziness, palpitations, irregular HR, sick contacts. Noted to have elevated troponin, ALEXI. Tested positive for COVID. Awake, alert, comfortable on [place] O2.    ROS:  as above    Allergies:  No Known Allergies    PAST MEDICAL & SURGICAL HISTORY:  HTN (hypertension)  Aortic stenosis  Type 2 diabetes mellitus  Lung mass  Dyslipidemia  History of transcatheter aortic valve replacement (TAVR)  History of inguinal hernia repair, bilateral  History of appendectomy    SOCIAL HISTORY:  negative    FAMILY HISTORY:  No significant family history    MEDICATIONS  (STANDING):  amLODIPine   Tablet 10 milliGRAM(s) Oral daily  ascorbic acid 500 milliGRAM(s) Oral daily  aspirin  chewable 81 milliGRAM(s) Oral daily  cefepime   IVPB 2000 milliGRAM(s) IV Intermittent every 12 hours  chlorhexidine 2% Cloths 1 Application(s) Topical <User Schedule>  dexAMETHasone  Injectable 6 milliGRAM(s) IV Push daily  doxycycline IVPB      doxycycline IVPB 100 milliGRAM(s) IV Intermittent every 12 hours  heparin  Infusion. 750 Unit(s)/Hr (7.5 mL/Hr) IV Continuous <Continuous>  insulin glargine Injectable (LANTUS) 15 Unit(s) SubCutaneous at bedtime  insulin lispro (ADMELOG) corrective regimen sliding scale   SubCutaneous four times a day before meals  metoprolol tartrate 25 milliGRAM(s) Oral every 12 hours  multivitamin 1 Tablet(s) Oral daily  pantoprazole    Tablet 40 milliGRAM(s) Oral before breakfast  remdesivir  IVPB   IV Intermittent   remdesivir  IVPB 100 milliGRAM(s) IV Intermittent every 24 hours  simvastatin 40 milliGRAM(s) Oral at bedtime    Home Medications:  amLODIPine 10 mg oral tablet: 1 tab(s) orally once a day (29 Nov 2023 18:21)  apixaban 5 mg oral tablet: 1 tab(s) orally 2 times a day (29 Nov 2023 18:21)  aspirin 81 mg oral delayed release capsule: orally once a day (29 Nov 2023 18:26)  folic acid 1 mg oral tablet: 1 tab(s) orally once a day (29 Nov 2023 18:26)  furosemide 20 mg oral tablet: 1 tab(s) orally 2 times a day (29 Nov 2023 18:26)  glipiZIDE 5 mg oral tablet: 1 tab(s) orally once a day (29 Nov 2023 18:21)  losartan 25 mg oral tablet: 1 tab(s) orally once a day (29 Nov 2023 18:21)  metoprolol succinate 25 mg oral capsule, extended release: 1 cap(s) orally (29 Nov 2023 18:26)  montelukast 10 mg oral tablet: 1 tab(s) orally once a day (29 Nov 2023 18:26)  Plavix 75 mg oral tablet: 1 tab(s) orally (29 Nov 2023 18:21)  pravastatin 40 mg oral tablet: 1 tab(s) orally once a day (29 Nov 2023 18:21)  SITagliptin 50 mg oral tablet: 1 tab(s) orally once a day (29 Nov 2023 18:27)  vitamin E dl-alpha 1000 intl units oral capsule: 1 cap(s) orally once a day (29 Nov 2023 18:27)    Vital Signs Last 24 Hrs  T(C): 36.9 (11-30-23 @ 12:00), Max: 36.9 (11-30-23 @ 05:00)  T(F): 98.5 (11-30-23 @ 12:00), Max: 98.5 (11-30-23 @ 05:00)  HR: 60 (11-30-23 @ 14:00) (60 - 97)  BP: 106/60 (11-30-23 @ 14:00) (101/55 - 126/63)  BP(mean): 74 (11-30-23 @ 14:00) (69 - 111)  RR: 19 (11-30-23 @ 14:00) (11 - 23)  SpO2: 97% (11-30-23 @ 14:00) (82% - 98%)    s1s2  b/l air entry  soft, ND  no edema     LABS:                        8.9    9.39  )-----------( 124      ( 30 Nov 2023 09:50 )             27.2     11-30    138  |  101  |  45<H>  ----------------------------<  226<H>  5.1   |  27  |  2.11<H>    Ca    9.4      30 Nov 2023 05:30  Phos  3.5     11-30  Mg     2.0     11-30    TPro  7.0  /  Alb  2.9<L>  /  TBili  0.4  /  DBili  x   /  AST  93<H>  /  ALT  29  /  AlkPhos  43  11-30    LIVER FUNCTIONS - ( 30 Nov 2023 05:30 )  Alb: 2.9 g/dL / Pro: 7.0 g/dL / ALK PHOS: 43 U/L / ALT: 29 U/L / AST: 93 U/L / GGT: x           PT/INR - ( 30 Nov 2023 05:30 )   PT: 15.5 sec;   INR: 1.37 ratio      PTT - ( 30 Nov 2023 09:50 )  PTT:54.7 sec    A/P:    ALEXI/CKD 3 iso COVID, NSTEMI, hypotension (Cr 1.73 - 9/28/23)  Goal SBP > 90  Avoid ACE/ARB  UA w/pr 30, otherwise bland  Will f/u renal SONO  Low K diet   CBC, BMP in am    [phone]         NEPHROLOGY CONSULTATION    CHIEF COMPLAINT: SOB    HPI:  Pt is 84 yo m with PMH dyslipidemia, HTN, status post TAVR on Eliquis, type 2 DM, R lung mass who presented to ED yesterday via EMS from home with progressive SOB associated with non-productive cough over the past several days. No chest pain, fevers, chills, nausea, abdominal pain, lightheadedness, dizziness, palpitations, irregular HR, sick contacts. Noted to have elevated troponin, ALEXI. Tested positive for COVID. Awake, alert, comfortable on [place] O2.    ROS:  as above    Allergies:  No Known Allergies    PAST MEDICAL & SURGICAL HISTORY:  HTN (hypertension)  Aortic stenosis  Type 2 diabetes mellitus  Lung mass  Dyslipidemia  History of transcatheter aortic valve replacement (TAVR)  History of inguinal hernia repair, bilateral  History of appendectomy    SOCIAL HISTORY:  negative    FAMILY HISTORY:  No significant family history    MEDICATIONS  (STANDING):  amLODIPine   Tablet 10 milliGRAM(s) Oral daily  ascorbic acid 500 milliGRAM(s) Oral daily  aspirin  chewable 81 milliGRAM(s) Oral daily  cefepime   IVPB 2000 milliGRAM(s) IV Intermittent every 12 hours  chlorhexidine 2% Cloths 1 Application(s) Topical <User Schedule>  dexAMETHasone  Injectable 6 milliGRAM(s) IV Push daily  doxycycline IVPB      doxycycline IVPB 100 milliGRAM(s) IV Intermittent every 12 hours  heparin  Infusion. 750 Unit(s)/Hr (7.5 mL/Hr) IV Continuous <Continuous>  insulin glargine Injectable (LANTUS) 15 Unit(s) SubCutaneous at bedtime  insulin lispro (ADMELOG) corrective regimen sliding scale   SubCutaneous four times a day before meals  metoprolol tartrate 25 milliGRAM(s) Oral every 12 hours  multivitamin 1 Tablet(s) Oral daily  pantoprazole    Tablet 40 milliGRAM(s) Oral before breakfast  remdesivir  IVPB   IV Intermittent   remdesivir  IVPB 100 milliGRAM(s) IV Intermittent every 24 hours  simvastatin 40 milliGRAM(s) Oral at bedtime    Home Medications:  amLODIPine 10 mg oral tablet: 1 tab(s) orally once a day (29 Nov 2023 18:21)  apixaban 5 mg oral tablet: 1 tab(s) orally 2 times a day (29 Nov 2023 18:21)  aspirin 81 mg oral delayed release capsule: orally once a day (29 Nov 2023 18:26)  folic acid 1 mg oral tablet: 1 tab(s) orally once a day (29 Nov 2023 18:26)  furosemide 20 mg oral tablet: 1 tab(s) orally 2 times a day (29 Nov 2023 18:26)  glipiZIDE 5 mg oral tablet: 1 tab(s) orally once a day (29 Nov 2023 18:21)  losartan 25 mg oral tablet: 1 tab(s) orally once a day (29 Nov 2023 18:21)  metoprolol succinate 25 mg oral capsule, extended release: 1 cap(s) orally (29 Nov 2023 18:26)  montelukast 10 mg oral tablet: 1 tab(s) orally once a day (29 Nov 2023 18:26)  Plavix 75 mg oral tablet: 1 tab(s) orally (29 Nov 2023 18:21)  pravastatin 40 mg oral tablet: 1 tab(s) orally once a day (29 Nov 2023 18:21)  SITagliptin 50 mg oral tablet: 1 tab(s) orally once a day (29 Nov 2023 18:27)  vitamin E dl-alpha 1000 intl units oral capsule: 1 cap(s) orally once a day (29 Nov 2023 18:27)    Vital Signs Last 24 Hrs  T(C): 36.9 (11-30-23 @ 12:00), Max: 36.9 (11-30-23 @ 05:00)  T(F): 98.5 (11-30-23 @ 12:00), Max: 98.5 (11-30-23 @ 05:00)  HR: 60 (11-30-23 @ 14:00) (60 - 97)  BP: 106/60 (11-30-23 @ 14:00) (101/55 - 126/63)  BP(mean): 74 (11-30-23 @ 14:00) (69 - 111)  RR: 19 (11-30-23 @ 14:00) (11 - 23)  SpO2: 97% (11-30-23 @ 14:00) (82% - 98%)    s1s2  b/l air entry  soft, ND  no edema     LABS:                        8.9    9.39  )-----------( 124      ( 30 Nov 2023 09:50 )             27.2     11-30    138  |  101  |  45<H>  ----------------------------<  226<H>  5.1   |  27  |  2.11<H>    Ca    9.4      30 Nov 2023 05:30  Phos  3.5     11-30  Mg     2.0     11-30    TPro  7.0  /  Alb  2.9<L>  /  TBili  0.4  /  DBili  x   /  AST  93<H>  /  ALT  29  /  AlkPhos  43  11-30    LIVER FUNCTIONS - ( 30 Nov 2023 05:30 )  Alb: 2.9 g/dL / Pro: 7.0 g/dL / ALK PHOS: 43 U/L / ALT: 29 U/L / AST: 93 U/L / GGT: x           PT/INR - ( 30 Nov 2023 05:30 )   PT: 15.5 sec;   INR: 1.37 ratio      PTT - ( 30 Nov 2023 09:50 )  PTT:54.7 sec    A/P:    ALEXI/CKD 3 iso COVID, NSTEMI, hypotension (Cr 1.73 - 9/28/23)  Goal SBP > 90  Avoid ACE/ARB  UA w/pr 30, otherwise bland  Will f/u renal SONO  Low K diet   CBC, BMP in am    [phone]         NEPHROLOGY CONSULTATION    CHIEF COMPLAINT: SOB    HPI:  Pt is 82 yo m with PMH dyslipidemia, HTN, status post TAVR on Eliquis, type 2 DM, R lung mass who presented to ED yesterday via EMS from home with progressive SOB associated with non-productive cough over the past several days. No chest pain, fevers, chills, nausea, abdominal pain, lightheadedness, dizziness, palpitations, irregular HR, sick contacts. Noted to have elevated troponin, ALEXI. Tested positive for COVID. Awake, alert, comfortable on [place] O2.    ROS:  as above    Allergies:  No Known Allergies    PAST MEDICAL & SURGICAL HISTORY:  HTN (hypertension)  Aortic stenosis  Type 2 diabetes mellitus  Lung mass  Dyslipidemia  History of transcatheter aortic valve replacement (TAVR)  History of inguinal hernia repair, bilateral  History of appendectomy    SOCIAL HISTORY:  negative    FAMILY HISTORY:  No significant family history    MEDICATIONS  (STANDING):  amLODIPine   Tablet 10 milliGRAM(s) Oral daily  ascorbic acid 500 milliGRAM(s) Oral daily  aspirin  chewable 81 milliGRAM(s) Oral daily  cefepime   IVPB 2000 milliGRAM(s) IV Intermittent every 12 hours  chlorhexidine 2% Cloths 1 Application(s) Topical <User Schedule>  dexAMETHasone  Injectable 6 milliGRAM(s) IV Push daily  doxycycline IVPB      doxycycline IVPB 100 milliGRAM(s) IV Intermittent every 12 hours  heparin  Infusion. 750 Unit(s)/Hr (7.5 mL/Hr) IV Continuous <Continuous>  insulin glargine Injectable (LANTUS) 15 Unit(s) SubCutaneous at bedtime  insulin lispro (ADMELOG) corrective regimen sliding scale   SubCutaneous four times a day before meals  metoprolol tartrate 25 milliGRAM(s) Oral every 12 hours  multivitamin 1 Tablet(s) Oral daily  pantoprazole    Tablet 40 milliGRAM(s) Oral before breakfast  remdesivir  IVPB   IV Intermittent   remdesivir  IVPB 100 milliGRAM(s) IV Intermittent every 24 hours  simvastatin 40 milliGRAM(s) Oral at bedtime    Home Medications:  amLODIPine 10 mg oral tablet: 1 tab(s) orally once a day (29 Nov 2023 18:21)  apixaban 5 mg oral tablet: 1 tab(s) orally 2 times a day (29 Nov 2023 18:21)  aspirin 81 mg oral delayed release capsule: orally once a day (29 Nov 2023 18:26)  folic acid 1 mg oral tablet: 1 tab(s) orally once a day (29 Nov 2023 18:26)  furosemide 20 mg oral tablet: 1 tab(s) orally 2 times a day (29 Nov 2023 18:26)  glipiZIDE 5 mg oral tablet: 1 tab(s) orally once a day (29 Nov 2023 18:21)  losartan 25 mg oral tablet: 1 tab(s) orally once a day (29 Nov 2023 18:21)  metoprolol succinate 25 mg oral capsule, extended release: 1 cap(s) orally (29 Nov 2023 18:26)  montelukast 10 mg oral tablet: 1 tab(s) orally once a day (29 Nov 2023 18:26)  Plavix 75 mg oral tablet: 1 tab(s) orally (29 Nov 2023 18:21)  pravastatin 40 mg oral tablet: 1 tab(s) orally once a day (29 Nov 2023 18:21)  SITagliptin 50 mg oral tablet: 1 tab(s) orally once a day (29 Nov 2023 18:27)  vitamin E dl-alpha 1000 intl units oral capsule: 1 cap(s) orally once a day (29 Nov 2023 18:27)    Vital Signs Last 24 Hrs  T(C): 36.9 (11-30-23 @ 12:00), Max: 36.9 (11-30-23 @ 05:00)  T(F): 98.5 (11-30-23 @ 12:00), Max: 98.5 (11-30-23 @ 05:00)  HR: 60 (11-30-23 @ 14:00) (60 - 97)  BP: 106/60 (11-30-23 @ 14:00) (101/55 - 126/63)  BP(mean): 74 (11-30-23 @ 14:00) (69 - 111)  RR: 19 (11-30-23 @ 14:00) (11 - 23)  SpO2: 97% (11-30-23 @ 14:00) (82% - 98%)    s1s2  b/l air entry  soft, ND  no edema     LABS:                        8.9    9.39  )-----------( 124      ( 30 Nov 2023 09:50 )             27.2     11-30    138  |  101  |  45<H>  ----------------------------<  226<H>  5.1   |  27  |  2.11<H>    Ca    9.4      30 Nov 2023 05:30  Phos  3.5     11-30  Mg     2.0     11-30    TPro  7.0  /  Alb  2.9<L>  /  TBili  0.4  /  DBili  x   /  AST  93<H>  /  ALT  29  /  AlkPhos  43  11-30    LIVER FUNCTIONS - ( 30 Nov 2023 05:30 )  Alb: 2.9 g/dL / Pro: 7.0 g/dL / ALK PHOS: 43 U/L / ALT: 29 U/L / AST: 93 U/L / GGT: x           PT/INR - ( 30 Nov 2023 05:30 )   PT: 15.5 sec;   INR: 1.37 ratio      PTT - ( 30 Nov 2023 09:50 )  PTT:54.7 sec    A/P:    ALEXI/CKD 3 iso COVID, NSTEMI, hypotension (Cr 1.73 - 9/28/23)  Goal SBP > 90  Avoid ACE/ARB  UA w/pr 30, otherwise bland  Will f/u renal SONO  Low K diet   CBC, BMP in am    [phone]

## 2023-11-30 NOTE — PROGRESS NOTE ADULT - SUBJECTIVE AND OBJECTIVE BOX
Follow-up Critical Care Progress Note  Chief Complaint : Acute on chronic systolic congestive heart failure        patient desaturated this am off o2, doing ok on n/c 5 L  off NIV  no cp, sob, palp, n/v  states feels well      Allergies :No Known Allergies      PAST MEDICAL & SURGICAL HISTORY:  HTN (hypertension)    Aortic stenosis    Type 2 diabetes mellitus    Lung mass    Dyslipidemia    History of transcatheter aortic valve replacement (TAVR)    History of inguinal hernia repair, bilateral    History of appendectomy        Medications:  MEDICATIONS  (STANDING):  amLODIPine   Tablet 10 milliGRAM(s) Oral daily  ascorbic acid 500 milliGRAM(s) Oral daily  aspirin  chewable 81 milliGRAM(s) Oral daily  cefepime   IVPB 2000 milliGRAM(s) IV Intermittent every 12 hours  chlorhexidine 2% Cloths 1 Application(s) Topical <User Schedule>  dexAMETHasone  Injectable 6 milliGRAM(s) IV Push daily  doxycycline IVPB 100 milliGRAM(s) IV Intermittent every 12 hours  doxycycline IVPB      heparin  Infusion. 750 Unit(s)/Hr (7.5 mL/Hr) IV Continuous <Continuous>  insulin glargine Injectable (LANTUS) 15 Unit(s) SubCutaneous at bedtime  insulin lispro (ADMELOG) corrective regimen sliding scale   SubCutaneous four times a day before meals  metoprolol tartrate 25 milliGRAM(s) Oral every 12 hours  multivitamin 1 Tablet(s) Oral daily  pantoprazole    Tablet 40 milliGRAM(s) Oral before breakfast  remdesivir  IVPB 100 milliGRAM(s) IV Intermittent every 24 hours  remdesivir  IVPB   IV Intermittent   simvastatin 40 milliGRAM(s) Oral at bedtime    MEDICATIONS  (PRN):  acetaminophen     Tablet .. 650 milliGRAM(s) Oral every 6 hours PRN Temp greater or equal to 38C (100.4F), Mild Pain (1 - 3)  albuterol    90 MICROgram(s) HFA Inhaler 1 Puff(s) Inhalation every 4 hours PRN Shortness of Breath and/or Wheezing  benzonatate 100 milliGRAM(s) Oral three times a day PRN Cough  guaiFENesin  milliGRAM(s) Oral every 12 hours PRN Cough  heparin   Injectable 6500 Unit(s) IV Push every 6 hours PRN For aPTT less than 40  heparin   Injectable 3000 Unit(s) IV Push every 6 hours PRN For aPTT between 40 - 57      Antibiotics History  cefepime   IVPB 2000 milliGRAM(s) IV Intermittent once, 11-29-23 @ 15:21, Stop order after: 1 Doses  cefepime   IVPB 2000 milliGRAM(s) IV Intermittent every 12 hours, 11-29-23 @ 18:22  doxycycline IVPB 100 milliGRAM(s) IV Intermittent once, 11-29-23 @ 22:19  doxycycline IVPB    , 11-29-23 @ 22:38  doxycycline IVPB 100 milliGRAM(s) IV Intermittent every 12 hours, 11-30-23 @ 06:00  remdesivir  IVPB 100 milliGRAM(s) IV Intermittent every 24 hours, 11-30-23 @ 23:51, Stop order after: 4 Doses  remdesivir  IVPB 200 milliGRAM(s) IV Intermittent every 24 hours, 11-29-23 @ 18:33, Stop order after: 1 Doses  remdesivir  IVPB   IV Intermittent , 11-29-23 @ 18:21      Heme Medications   aspirin  chewable 81 milliGRAM(s) Oral daily, 11-30-23 @ 00:00  heparin   Injectable 6500 Unit(s) IV Push every 6 hours, 11-30-23 @ 03:10 PRN  heparin   Injectable 3000 Unit(s) IV Push every 6 hours, 11-30-23 @ 03:10 PRN  heparin  Infusion. 750 Unit(s)/Hr IV Continuous <Continuous>, 11-30-23 @ 03:10      GI Medications  pantoprazole    Tablet 40 milliGRAM(s) Oral before breakfast, 11-29-23 @ 18:24, Routine      COVID  11-29-23 @ 14:20  COVID -   Detected<!>       Trend Cardiac Enzymes  11-30-23 @ 05:30  EIA-CWZSQ-PJMNT-CPKMM/Trop I - -- - --  - --  -  --  /  7121.6<H>  11-29-23 @ 16:00  KVB-QBKKZ-NQHMJ-CPKMM/Trop I - -- - --  - --  -  --  /  4762.3<H>  11-29-23 @ 14:20  FQS-SLEBD-HBIBG-CPKMM/Trop I - -- - --  - --  -  --  /  4364.8<H>    Trend BNP  11-30-23 @ 05:30   -  27766<H>  11-29-23 @ 14:20   -  87390<H>    Procalcitonin Trend    WBC Trend  11-30-23 @ 05:30   -  7.92  11-30-23 @ 01:10   -  6.68  11-29-23 @ 14:20   -  6.95    H/H Trend  11-30-23 @ 05:30   -   8.8<L>/ 27.0<L>  11-30-23 @ 01:10   -   8.4<L>/ 25.4<L>  11-29-23 @ 14:20   -   10.1<L>/ 31.1<L>    Stool Occult Blood    Platelet Trend  11-30-23 @ 05:30   -  120<L>  11-30-23 @ 01:10   -  115<L>  11-29-23 @ 14:20   -  140<L>    Trend Sodium  11-30-23 @ 05:30   -  138  11-29-23 @ 14:20   -  136    Trend Potassium  11-30-23 @ 05:30   -  5.1  11-29-23 @ 14:20   -  4.5    Trend Bun/Cr  11-30-23 @ 05:30  BUN/CR -  45<H> / 2.11<H>  11-29-23 @ 14:20  BUN/CR -  37<H> / 2.08<H>    Lactic Acid Trend  11-29-23 @ 14:20   -   1.9    Trend AST/ALT/ALK Phos/Bili  11-30-23 @ 05:30   93<H>/29/43/0.4  11-29-23 @ 14:20   47<H>/28/55/0.4         Albumin Trend  11-30-23 @ 05:30   -   2.9<L>  11-29-23 @ 14:20   -   3.6      PTT - PT - INR Trend  11-30-23 @ 09:50   -   54.7<H> - -- - --  11-30-23 @ 05:30   -   58.1<H> - 15.5<H> - 1.37<H>  11-30-23 @ 01:10   -   104.1<H> - -- - --  11-29-23 @ 14:20   -   35.3 - 16.4<H> - 1.42<H>    Glucose Trend  11-30-23 @ 05:30   -  226<H> -- --  11-30-23 @ 05:18   -  -- -- 225<H>  11-29-23 @ 23:49   -  -- -- 439<H>  11-29-23 @ 23:41   -  -- -- 403<H>  11-29-23 @ 14:20   -  317<H> -- --        LABS:                        8.8    7.92  )-----------( 120      ( 30 Nov 2023 05:30 )             27.0     11-30    138  |  101  |  45<H>  ----------------------------<  226<H>  5.1   |  27  |  2.11<H>    Ca    9.4      30 Nov 2023 05:30  Phos  3.5     11-30  Mg     2.0     11-30    TPro  7.0  /  Alb  2.9<L>  /  TBili  0.4  /  DBili  x   /  AST  93<H>  /  ALT  29  /  AlkPhos  43  11-30           RADIOLOGY  CXR:  improved Left haziness, continued right haziness     VITALS:  T(C): 36.9 (11-30-23 @ 05:00), Max: 36.9 (11-30-23 @ 05:00)  T(F): 98.5 (11-30-23 @ 05:00), Max: 98.5 (11-30-23 @ 05:00)  HR: 64 (11-30-23 @ 08:00) (64 - 97)  BP: 114/68 (11-30-23 @ 08:00) (101/55 - 126/63)  BP(mean): 78 (11-30-23 @ 08:00) (69 - 101)  ABP: --  ABP(mean): --  RR: 18 (11-30-23 @ 08:00) (11 - 23)  SpO2: 82% (11-30-23 @ 08:00) (82% - 100%)  CVP(mm Hg): --  CVP(cm H2O): --    Ins and Outs     11-29-23 @ 07:01  -  11-30-23 @ 07:00  --------------------------------------------------------  IN: 80 mL / OUT: 0 mL / NET: 80 mL    11-30-23 @ 07:01  -  11-30-23 @ 10:15  --------------------------------------------------------  IN: 7.5 mL / OUT: 0 mL / NET: 7.5 mL          Weight (kg): 83.6 (11-29-23 @ 21:50)        I&O's Detail    29 Nov 2023 07:01  -  30 Nov 2023 07:00  --------------------------------------------------------  IN:    Heparin Infusion: 50 mL    Heparin Infusion: 30 mL  Total IN: 80 mL    OUT:  Total OUT: 0 mL    Total NET: 80 mL      30 Nov 2023 07:01 - 30 Nov 2023 10:15  --------------------------------------------------------  IN:    Heparin Infusion: 7.5 mL  Total IN: 7.5 mL    OUT:  Total OUT: 0 mL    Total NET: 7.5 mL            Follow-up Critical Care Progress Note  Chief Complaint : Acute on chronic systolic congestive heart failure        patient desaturated this am off o2, doing ok on n/c 5 L  off NIV  no cp, sob, palp, n/v  states feels well      Allergies :No Known Allergies      PAST MEDICAL & SURGICAL HISTORY:  HTN (hypertension)    Aortic stenosis    Type 2 diabetes mellitus    Lung mass    Dyslipidemia    History of transcatheter aortic valve replacement (TAVR)    History of inguinal hernia repair, bilateral    History of appendectomy        Medications:  MEDICATIONS  (STANDING):  amLODIPine   Tablet 10 milliGRAM(s) Oral daily  ascorbic acid 500 milliGRAM(s) Oral daily  aspirin  chewable 81 milliGRAM(s) Oral daily  cefepime   IVPB 2000 milliGRAM(s) IV Intermittent every 12 hours  chlorhexidine 2% Cloths 1 Application(s) Topical <User Schedule>  dexAMETHasone  Injectable 6 milliGRAM(s) IV Push daily  doxycycline IVPB 100 milliGRAM(s) IV Intermittent every 12 hours  doxycycline IVPB      heparin  Infusion. 750 Unit(s)/Hr (7.5 mL/Hr) IV Continuous <Continuous>  insulin glargine Injectable (LANTUS) 15 Unit(s) SubCutaneous at bedtime  insulin lispro (ADMELOG) corrective regimen sliding scale   SubCutaneous four times a day before meals  metoprolol tartrate 25 milliGRAM(s) Oral every 12 hours  multivitamin 1 Tablet(s) Oral daily  pantoprazole    Tablet 40 milliGRAM(s) Oral before breakfast  remdesivir  IVPB 100 milliGRAM(s) IV Intermittent every 24 hours  remdesivir  IVPB   IV Intermittent   simvastatin 40 milliGRAM(s) Oral at bedtime    MEDICATIONS  (PRN):  acetaminophen     Tablet .. 650 milliGRAM(s) Oral every 6 hours PRN Temp greater or equal to 38C (100.4F), Mild Pain (1 - 3)  albuterol    90 MICROgram(s) HFA Inhaler 1 Puff(s) Inhalation every 4 hours PRN Shortness of Breath and/or Wheezing  benzonatate 100 milliGRAM(s) Oral three times a day PRN Cough  guaiFENesin  milliGRAM(s) Oral every 12 hours PRN Cough  heparin   Injectable 6500 Unit(s) IV Push every 6 hours PRN For aPTT less than 40  heparin   Injectable 3000 Unit(s) IV Push every 6 hours PRN For aPTT between 40 - 57      Antibiotics History  cefepime   IVPB 2000 milliGRAM(s) IV Intermittent once, 11-29-23 @ 15:21, Stop order after: 1 Doses  cefepime   IVPB 2000 milliGRAM(s) IV Intermittent every 12 hours, 11-29-23 @ 18:22  doxycycline IVPB 100 milliGRAM(s) IV Intermittent once, 11-29-23 @ 22:19  doxycycline IVPB    , 11-29-23 @ 22:38  doxycycline IVPB 100 milliGRAM(s) IV Intermittent every 12 hours, 11-30-23 @ 06:00  remdesivir  IVPB 100 milliGRAM(s) IV Intermittent every 24 hours, 11-30-23 @ 23:51, Stop order after: 4 Doses  remdesivir  IVPB 200 milliGRAM(s) IV Intermittent every 24 hours, 11-29-23 @ 18:33, Stop order after: 1 Doses  remdesivir  IVPB   IV Intermittent , 11-29-23 @ 18:21      Heme Medications   aspirin  chewable 81 milliGRAM(s) Oral daily, 11-30-23 @ 00:00  heparin   Injectable 6500 Unit(s) IV Push every 6 hours, 11-30-23 @ 03:10 PRN  heparin   Injectable 3000 Unit(s) IV Push every 6 hours, 11-30-23 @ 03:10 PRN  heparin  Infusion. 750 Unit(s)/Hr IV Continuous <Continuous>, 11-30-23 @ 03:10      GI Medications  pantoprazole    Tablet 40 milliGRAM(s) Oral before breakfast, 11-29-23 @ 18:24, Routine      COVID  11-29-23 @ 14:20  COVID -   Detected<!>       Trend Cardiac Enzymes  11-30-23 @ 05:30  WCB-LQGRC-UXZPZ-CPKMM/Trop I - -- - --  - --  -  --  /  7121.6<H>  11-29-23 @ 16:00  JZM-GLFPM-JLCDN-CPKMM/Trop I - -- - --  - --  -  --  /  4762.3<H>  11-29-23 @ 14:20  KVZ-VPNTL-ILQJK-CPKMM/Trop I - -- - --  - --  -  --  /  4364.8<H>    Trend BNP  11-30-23 @ 05:30   -  52356<H>  11-29-23 @ 14:20   -  35234<H>    Procalcitonin Trend    WBC Trend  11-30-23 @ 05:30   -  7.92  11-30-23 @ 01:10   -  6.68  11-29-23 @ 14:20   -  6.95    H/H Trend  11-30-23 @ 05:30   -   8.8<L>/ 27.0<L>  11-30-23 @ 01:10   -   8.4<L>/ 25.4<L>  11-29-23 @ 14:20   -   10.1<L>/ 31.1<L>    Stool Occult Blood    Platelet Trend  11-30-23 @ 05:30   -  120<L>  11-30-23 @ 01:10   -  115<L>  11-29-23 @ 14:20   -  140<L>    Trend Sodium  11-30-23 @ 05:30   -  138  11-29-23 @ 14:20   -  136    Trend Potassium  11-30-23 @ 05:30   -  5.1  11-29-23 @ 14:20   -  4.5    Trend Bun/Cr  11-30-23 @ 05:30  BUN/CR -  45<H> / 2.11<H>  11-29-23 @ 14:20  BUN/CR -  37<H> / 2.08<H>    Lactic Acid Trend  11-29-23 @ 14:20   -   1.9    Trend AST/ALT/ALK Phos/Bili  11-30-23 @ 05:30   93<H>/29/43/0.4  11-29-23 @ 14:20   47<H>/28/55/0.4         Albumin Trend  11-30-23 @ 05:30   -   2.9<L>  11-29-23 @ 14:20   -   3.6      PTT - PT - INR Trend  11-30-23 @ 09:50   -   54.7<H> - -- - --  11-30-23 @ 05:30   -   58.1<H> - 15.5<H> - 1.37<H>  11-30-23 @ 01:10   -   104.1<H> - -- - --  11-29-23 @ 14:20   -   35.3 - 16.4<H> - 1.42<H>    Glucose Trend  11-30-23 @ 05:30   -  226<H> -- --  11-30-23 @ 05:18   -  -- -- 225<H>  11-29-23 @ 23:49   -  -- -- 439<H>  11-29-23 @ 23:41   -  -- -- 403<H>  11-29-23 @ 14:20   -  317<H> -- --        LABS:                        8.8    7.92  )-----------( 120      ( 30 Nov 2023 05:30 )             27.0     11-30    138  |  101  |  45<H>  ----------------------------<  226<H>  5.1   |  27  |  2.11<H>    Ca    9.4      30 Nov 2023 05:30  Phos  3.5     11-30  Mg     2.0     11-30    TPro  7.0  /  Alb  2.9<L>  /  TBili  0.4  /  DBili  x   /  AST  93<H>  /  ALT  29  /  AlkPhos  43  11-30           RADIOLOGY  CXR:  improved Left haziness, continued right haziness     VITALS:  T(C): 36.9 (11-30-23 @ 05:00), Max: 36.9 (11-30-23 @ 05:00)  T(F): 98.5 (11-30-23 @ 05:00), Max: 98.5 (11-30-23 @ 05:00)  HR: 64 (11-30-23 @ 08:00) (64 - 97)  BP: 114/68 (11-30-23 @ 08:00) (101/55 - 126/63)  BP(mean): 78 (11-30-23 @ 08:00) (69 - 101)  ABP: --  ABP(mean): --  RR: 18 (11-30-23 @ 08:00) (11 - 23)  SpO2: 82% (11-30-23 @ 08:00) (82% - 100%)  CVP(mm Hg): --  CVP(cm H2O): --    Ins and Outs     11-29-23 @ 07:01  -  11-30-23 @ 07:00  --------------------------------------------------------  IN: 80 mL / OUT: 0 mL / NET: 80 mL    11-30-23 @ 07:01  -  11-30-23 @ 10:15  --------------------------------------------------------  IN: 7.5 mL / OUT: 0 mL / NET: 7.5 mL          Weight (kg): 83.6 (11-29-23 @ 21:50)        I&O's Detail    29 Nov 2023 07:01  -  30 Nov 2023 07:00  --------------------------------------------------------  IN:    Heparin Infusion: 50 mL    Heparin Infusion: 30 mL  Total IN: 80 mL    OUT:  Total OUT: 0 mL    Total NET: 80 mL      30 Nov 2023 07:01 - 30 Nov 2023 10:15  --------------------------------------------------------  IN:    Heparin Infusion: 7.5 mL  Total IN: 7.5 mL    OUT:  Total OUT: 0 mL    Total NET: 7.5 mL            Follow-up Critical Care Progress Note  Chief Complaint : Acute on chronic systolic congestive heart failure        patient desaturated this am off o2, doing ok on n/c 5 L  off NIV  no cp, sob, palp, n/v  states feels well      Allergies :No Known Allergies      PAST MEDICAL & SURGICAL HISTORY:  HTN (hypertension)    Aortic stenosis    Type 2 diabetes mellitus    Lung mass    Dyslipidemia    History of transcatheter aortic valve replacement (TAVR)    History of inguinal hernia repair, bilateral    History of appendectomy        Medications:  MEDICATIONS  (STANDING):  amLODIPine   Tablet 10 milliGRAM(s) Oral daily  ascorbic acid 500 milliGRAM(s) Oral daily  aspirin  chewable 81 milliGRAM(s) Oral daily  cefepime   IVPB 2000 milliGRAM(s) IV Intermittent every 12 hours  chlorhexidine 2% Cloths 1 Application(s) Topical <User Schedule>  dexAMETHasone  Injectable 6 milliGRAM(s) IV Push daily  doxycycline IVPB 100 milliGRAM(s) IV Intermittent every 12 hours  doxycycline IVPB      heparin  Infusion. 750 Unit(s)/Hr (7.5 mL/Hr) IV Continuous <Continuous>  insulin glargine Injectable (LANTUS) 15 Unit(s) SubCutaneous at bedtime  insulin lispro (ADMELOG) corrective regimen sliding scale   SubCutaneous four times a day before meals  metoprolol tartrate 25 milliGRAM(s) Oral every 12 hours  multivitamin 1 Tablet(s) Oral daily  pantoprazole    Tablet 40 milliGRAM(s) Oral before breakfast  remdesivir  IVPB 100 milliGRAM(s) IV Intermittent every 24 hours  remdesivir  IVPB   IV Intermittent   simvastatin 40 milliGRAM(s) Oral at bedtime    MEDICATIONS  (PRN):  acetaminophen     Tablet .. 650 milliGRAM(s) Oral every 6 hours PRN Temp greater or equal to 38C (100.4F), Mild Pain (1 - 3)  albuterol    90 MICROgram(s) HFA Inhaler 1 Puff(s) Inhalation every 4 hours PRN Shortness of Breath and/or Wheezing  benzonatate 100 milliGRAM(s) Oral three times a day PRN Cough  guaiFENesin  milliGRAM(s) Oral every 12 hours PRN Cough  heparin   Injectable 6500 Unit(s) IV Push every 6 hours PRN For aPTT less than 40  heparin   Injectable 3000 Unit(s) IV Push every 6 hours PRN For aPTT between 40 - 57      Antibiotics History  cefepime   IVPB 2000 milliGRAM(s) IV Intermittent once, 11-29-23 @ 15:21, Stop order after: 1 Doses  cefepime   IVPB 2000 milliGRAM(s) IV Intermittent every 12 hours, 11-29-23 @ 18:22  doxycycline IVPB 100 milliGRAM(s) IV Intermittent once, 11-29-23 @ 22:19  doxycycline IVPB    , 11-29-23 @ 22:38  doxycycline IVPB 100 milliGRAM(s) IV Intermittent every 12 hours, 11-30-23 @ 06:00  remdesivir  IVPB 100 milliGRAM(s) IV Intermittent every 24 hours, 11-30-23 @ 23:51, Stop order after: 4 Doses  remdesivir  IVPB 200 milliGRAM(s) IV Intermittent every 24 hours, 11-29-23 @ 18:33, Stop order after: 1 Doses  remdesivir  IVPB   IV Intermittent , 11-29-23 @ 18:21      Heme Medications   aspirin  chewable 81 milliGRAM(s) Oral daily, 11-30-23 @ 00:00  heparin   Injectable 6500 Unit(s) IV Push every 6 hours, 11-30-23 @ 03:10 PRN  heparin   Injectable 3000 Unit(s) IV Push every 6 hours, 11-30-23 @ 03:10 PRN  heparin  Infusion. 750 Unit(s)/Hr IV Continuous <Continuous>, 11-30-23 @ 03:10      GI Medications  pantoprazole    Tablet 40 milliGRAM(s) Oral before breakfast, 11-29-23 @ 18:24, Routine      COVID  11-29-23 @ 14:20  COVID -   Detected<!>       Trend Cardiac Enzymes  11-30-23 @ 05:30  OKW-TTDAT-WDTRS-CPKMM/Trop I - -- - --  - --  -  --  /  7121.6<H>  11-29-23 @ 16:00  YSY-ROMKI-VZUHX-CPKMM/Trop I - -- - --  - --  -  --  /  4762.3<H>  11-29-23 @ 14:20  XJH-UBOZY-YDGBN-CPKMM/Trop I - -- - --  - --  -  --  /  4364.8<H>    Trend BNP  11-30-23 @ 05:30   -  17278<H>  11-29-23 @ 14:20   -  74323<H>    Procalcitonin Trend    WBC Trend  11-30-23 @ 05:30   -  7.92  11-30-23 @ 01:10   -  6.68  11-29-23 @ 14:20   -  6.95    H/H Trend  11-30-23 @ 05:30   -   8.8<L>/ 27.0<L>  11-30-23 @ 01:10   -   8.4<L>/ 25.4<L>  11-29-23 @ 14:20   -   10.1<L>/ 31.1<L>    Stool Occult Blood    Platelet Trend  11-30-23 @ 05:30   -  120<L>  11-30-23 @ 01:10   -  115<L>  11-29-23 @ 14:20   -  140<L>    Trend Sodium  11-30-23 @ 05:30   -  138  11-29-23 @ 14:20   -  136    Trend Potassium  11-30-23 @ 05:30   -  5.1  11-29-23 @ 14:20   -  4.5    Trend Bun/Cr  11-30-23 @ 05:30  BUN/CR -  45<H> / 2.11<H>  11-29-23 @ 14:20  BUN/CR -  37<H> / 2.08<H>    Lactic Acid Trend  11-29-23 @ 14:20   -   1.9    Trend AST/ALT/ALK Phos/Bili  11-30-23 @ 05:30   93<H>/29/43/0.4  11-29-23 @ 14:20   47<H>/28/55/0.4         Albumin Trend  11-30-23 @ 05:30   -   2.9<L>  11-29-23 @ 14:20   -   3.6      PTT - PT - INR Trend  11-30-23 @ 09:50   -   54.7<H> - -- - --  11-30-23 @ 05:30   -   58.1<H> - 15.5<H> - 1.37<H>  11-30-23 @ 01:10   -   104.1<H> - -- - --  11-29-23 @ 14:20   -   35.3 - 16.4<H> - 1.42<H>    Glucose Trend  11-30-23 @ 05:30   -  226<H> -- --  11-30-23 @ 05:18   -  -- -- 225<H>  11-29-23 @ 23:49   -  -- -- 439<H>  11-29-23 @ 23:41   -  -- -- 403<H>  11-29-23 @ 14:20   -  317<H> -- --        LABS:                        8.8    7.92  )-----------( 120      ( 30 Nov 2023 05:30 )             27.0     11-30    138  |  101  |  45<H>  ----------------------------<  226<H>  5.1   |  27  |  2.11<H>    Ca    9.4      30 Nov 2023 05:30  Phos  3.5     11-30  Mg     2.0     11-30    TPro  7.0  /  Alb  2.9<L>  /  TBili  0.4  /  DBili  x   /  AST  93<H>  /  ALT  29  /  AlkPhos  43  11-30           RADIOLOGY  CXR:  improved Left haziness, continued right haziness     VITALS:  T(C): 36.9 (11-30-23 @ 05:00), Max: 36.9 (11-30-23 @ 05:00)  T(F): 98.5 (11-30-23 @ 05:00), Max: 98.5 (11-30-23 @ 05:00)  HR: 64 (11-30-23 @ 08:00) (64 - 97)  BP: 114/68 (11-30-23 @ 08:00) (101/55 - 126/63)  BP(mean): 78 (11-30-23 @ 08:00) (69 - 101)  ABP: --  ABP(mean): --  RR: 18 (11-30-23 @ 08:00) (11 - 23)  SpO2: 82% (11-30-23 @ 08:00) (82% - 100%)  CVP(mm Hg): --  CVP(cm H2O): --    Ins and Outs     11-29-23 @ 07:01  -  11-30-23 @ 07:00  --------------------------------------------------------  IN: 80 mL / OUT: 0 mL / NET: 80 mL    11-30-23 @ 07:01  -  11-30-23 @ 10:15  --------------------------------------------------------  IN: 7.5 mL / OUT: 0 mL / NET: 7.5 mL          Weight (kg): 83.6 (11-29-23 @ 21:50)        I&O's Detail    29 Nov 2023 07:01  -  30 Nov 2023 07:00  --------------------------------------------------------  IN:    Heparin Infusion: 50 mL    Heparin Infusion: 30 mL  Total IN: 80 mL    OUT:  Total OUT: 0 mL    Total NET: 80 mL      30 Nov 2023 07:01 - 30 Nov 2023 10:15  --------------------------------------------------------  IN:    Heparin Infusion: 7.5 mL  Total IN: 7.5 mL    OUT:  Total OUT: 0 mL    Total NET: 7.5 mL

## 2023-11-30 NOTE — CONSULT NOTE ADULT - CONVERSATION DETAILS
met and spoke w/ pt at bedside this afternoon, pt very awake , alert, oriented, conversant, pleasant. I introduced self and explained role of supportive/palliative care .  Pt is  aware of why he is here, says was at home and became sob and has had a cough , he is aware he has covid, and fluid in his lungs . says  he sees a doctor in [facility] for his heart .  I asked pt how he was doing prior to this hospitalization, pt reports he was doing fine, that he lives at home w/ his wife , and he is normally ambulatory at home, and is still driving his car. he does have 3 children that do help them out when needed, one son Lake, lives a few blocks away, daughter lives nearby , and other son lives in [place].   I explained to pt that sometimes we need to know who could be their medical decision maker if he became unable to do so for himself, pt said it would be his wife , and if she could not , then it would be his son Lake . Pt says Lake is a doctor of psychiatry.   I then asked pt if he were to become sicker, and if his heart or breathing become compromised, would he want cpr performed and likely get a breathing tube  , pt said " of course I would"  explaining he loves his life right now, and would like to continue to try to live if he can . met and spoke w/ pt at bedside this afternoon, pt very awake , alert, oriented, conversant, pleasant. I introduced self and explained role of supportive/palliative care .  Pt is  aware of why he is here, says was at home and became sob and has had a cough , he is aware he has covid, and fluid in his lungs . says  he sees a doctor in [facility] for his heart .  I asked pt how he was doing prior to this hospitalization, pt reports he was doing fine, that he lives at home w/ his wife , and he is normally ambulatory at home, and is still driving his car. he does have 3 children that do help them out when needed, one son Lake, lives a few blocks away, daughter lives nearby , and other son lives in [place].   I explained to pt that sometimes we need to know who could be their medical decision maker if he became unable to do so for himself, pt said it would be his wife , and if she could not , then it would be his son Lake . Pt says Lake is a doctor of psychiatry.   I then asked pt if he were to become sicker, and if his heart or breathing become compromised, would he want cpr performed and likely get a breathing tube  , pt said " of course I would"  explaining he loves his life right now, and would like to continue to try to live if he can . met and spoke w/ pt at bedside this afternoon, pt very awake , alert, oriented, conversant, pleasant. I introduced self and explained role of supportive/palliative care .  Pt is  aware of why he is here, says was at home and became sob and has had a cough , he is aware he has covid, and fluid in his lungs . says  he sees a doctor in [facility] for his heart .  I asked pt how he was doing prior to this hospitalization, pt reports he was doing fine, that he lives at home w/ his wife , and he is normally ambulatory at home, and is still driving his car. he does have 3 children that do help them out when needed, one son Lake, lives a few blocks away, daughter lives nearby , and other son lives in [place].   I explained to pt that sometimes we need to know who could be their medical decision maker if he became unable to do so for himself, pt said it would be his wife , and if she could not , then it would be his son Lake . Pt says aLke is a doctor of psychiatry.   I then asked pt if he were to become sicker, and if his heart or breathing become compromised, would he want cpr performed and likely get a breathing tube  , pt said " of course I would"  explaining he loves his life right now, and would like to continue to try to live if he can .

## 2023-11-30 NOTE — DIETITIAN INITIAL EVALUATION ADULT - PERTINENT MEDS FT
MEDICATIONS  (STANDING):  amLODIPine   Tablet 10 milliGRAM(s) Oral daily  ascorbic acid 500 milliGRAM(s) Oral daily  aspirin  chewable 81 milliGRAM(s) Oral daily  cefepime   IVPB 2000 milliGRAM(s) IV Intermittent every 12 hours  chlorhexidine 2% Cloths 1 Application(s) Topical <User Schedule>  dexAMETHasone  Injectable 6 milliGRAM(s) IV Push daily  doxycycline IVPB      doxycycline IVPB 100 milliGRAM(s) IV Intermittent every 12 hours  heparin  Infusion. 750 Unit(s)/Hr (7.5 mL/Hr) IV Continuous <Continuous>  insulin glargine Injectable (LANTUS) 15 Unit(s) SubCutaneous at bedtime  insulin lispro (ADMELOG) corrective regimen sliding scale   SubCutaneous four times a day before meals  metoprolol tartrate 25 milliGRAM(s) Oral every 12 hours  multivitamin 1 Tablet(s) Oral daily  pantoprazole    Tablet 40 milliGRAM(s) Oral before breakfast  remdesivir  IVPB   IV Intermittent   remdesivir  IVPB 100 milliGRAM(s) IV Intermittent every 24 hours  simvastatin 40 milliGRAM(s) Oral at bedtime    MEDICATIONS  (PRN):  acetaminophen     Tablet .. 650 milliGRAM(s) Oral every 6 hours PRN Temp greater or equal to 38C (100.4F), Mild Pain (1 - 3)  albuterol    90 MICROgram(s) HFA Inhaler 1 Puff(s) Inhalation every 4 hours PRN Shortness of Breath and/or Wheezing  benzonatate 100 milliGRAM(s) Oral three times a day PRN Cough  guaiFENesin  milliGRAM(s) Oral every 12 hours PRN Cough  heparin   Injectable 6500 Unit(s) IV Push every 6 hours PRN For aPTT less than 40  heparin   Injectable 3000 Unit(s) IV Push every 6 hours PRN For aPTT between 40 - 57

## 2023-11-30 NOTE — PROGRESS NOTE ADULT - SUBJECTIVE AND OBJECTIVE BOX
LUZ MARIA EVANS  [id number]    Chief complaint: NSTEMI, COVID    Interval events: No c.o chest pain or sob. On 4l nc. Sinus on tele 60-70s bpm with occasional PVC    ALLERGIES:  No Known Allergies      PAST MEDICAL & SURGICAL HISTORY:  Hypertension  TAVR    ROS:  All 10 systems reviewed and positives noted in HPI    OBJECTIVE:    VITAL SIGNS:  Vital Signs Last 24 Hrs  T(C): 36.6 (29 Nov 2023 13:40), Max: 36.6 (29 Nov 2023 13:40)  T(F): 97.9 (29 Nov 2023 13:40), Max: 97.9 (29 Nov 2023 13:40)  HR: 94 (29 Nov 2023 15:28) (89 - 94)  BP: 119/72 (29 Nov 2023 13:40) (119/72 - 119/72)  BP(mean): --  RR: 22 (29 Nov 2023 13:40) (22 - 22)  SpO2: 98% (29 Nov 2023 15:28) (98% - 100%)    Parameters below as of 29 Nov 2023 13:40  Patient On (Oxygen Delivery Method): mask, nonrebreather  O2 Flow (L/min): 15    PHYSICAL EXAM:  General: on 4l n/c  HEENT: sclera anicteric  Neck: supple  CVS: JVP ~ 7 cm H20, RRR, s1, s2, no murmurs  Chest: + wheeze  Abdomen: distended  Extremities: mild lower extremity edema b/l  Neuro: awake, alert & oriented  Psych: normal affect      LABS:                        10.1   6.95  )-----------( 140      ( 29 Nov 2023 14:20 )             31.1     11-29    136  |  99  |  37<H>  ----------------------------<  317<H>  4.5   |  29  |  2.08<H>    Ca    9.7      29 Nov 2023 14:20  Mg     2.1     11-29    TPro  7.7  /  Alb  3.6  /  TBili  0.4  /  DBili  x   /  AST  47<H>  /  ALT  28  /  AlkPhos  55  11-29        PT/INR - ( 29 Nov 2023 14:20 )   PT: 16.4 sec;   INR: 1.42 ratio         PTT - ( 29 Nov 2023 14:20 )  PTT:35.3 sec      TTE (1/2022):  LVEF 60-65%  TAVR    TTE (11/29/23):   1. Technically difficult study with poor endocardial visualization.   2. Mildly decreased global left ventricular systolic function.   3. Left ventricular ejection fraction, by visual estimation, is 45 to 50%.   4. Mildly increased LV wall thickness.   5. Normal left ventricular internal cavity size.   6. The left ventricle endocardium is not well visualized, consider use   of IV ultrasonic enhancing agent to better evaluate regional wall motion. Abnormal septal motion which may be due to paced rhythm. The mid inferior and apical inferior walls appear hypokinetic.   7. The right ventricle is not well visualized, appears to have normal   systolic function.   8. The left atrium is not well visualized, appears generally normal in   size.   9. The right atrium is not well visualized, appears generally normal in   size.  10. Mild mitral annular calcification.  11. Mild thickening and calcification of the anterior mitral valve   leaflet.  12. Mild mitral valve regurgitation.  13. There is a bioprosthetic valve in the aortic position, appears well   seated with peak velocity within normal limits.  14. There is no evidence of pericardial effusion.    ECG (11/29/23): atrial sensed, ventricular paed   LUZ MARIA EVANS  [id number]    Chief complaint: NSTEMI, COVID    Interval events: No c.o chest pain or sob. On 4l nc. Sinus on tele 60-70s bpm with occasional PVC    ALLERGIES:  No Known Allergies      PAST MEDICAL & SURGICAL HISTORY:  Hypertension  TAVR    ROS:  All 10 systems reviewed and positives noted in HPI    OBJECTIVE:    VITAL SIGNS:  Vital Signs Last 24 Hrs  T(C): 36.6 (29 Nov 2023 13:40), Max: 36.6 (29 Nov 2023 13:40)  T(F): 97.9 (29 Nov 2023 13:40), Max: 97.9 (29 Nov 2023 13:40)  HR: 94 (29 Nov 2023 15:28) (89 - 94)  BP: 119/72 (29 Nov 2023 13:40) (119/72 - 119/72)  BP(mean): --  RR: 22 (29 Nov 2023 13:40) (22 - 22)  SpO2: 98% (29 Nov 2023 15:28) (98% - 100%)    Parameters below as of 29 Nov 2023 13:40  Patient On (Oxygen Delivery Method): mask, nonrebreather  O2 Flow (L/min): 15    PHYSICAL EXAM:  General: on 4l n/c  HEENT: sclera anicteric  Neck: supple  CVS: JVP ~ 7 cm H20, RRR, s1, s2, no murmurs  Chest: + wheeze  Abdomen: distended  Extremities: mild lower extremity edema b/l  Neuro: awake, alert & oriented  Psych: normal affect      LABS:                        10.1   6.95  )-----------( 140      ( 29 Nov 2023 14:20 )             31.1     11-29    136  |  99  |  37<H>  ----------------------------<  317<H>  4.5   |  29  |  2.08<H>    Ca    9.7      29 Nov 2023 14:20  Mg     2.1     11-29    TPro  7.7  /  Alb  3.6  /  TBili  0.4  /  DBili  x   /  AST  47<H>  /  ALT  28  /  AlkPhos  55  11-29        PT/INR - ( 29 Nov 2023 14:20 )   PT: 16.4 sec;   INR: 1.42 ratio         PTT - ( 29 Nov 2023 14:20 )  PTT:35.3 sec      TTE (1/2022):  LVEF 60-65%  TAVR    TTE (11/29/23):   1. Technically difficult study with poor endocardial visualization.   2. Mildly decreased global left ventricular systolic function.   3. Left ventricular ejection fraction, by visual estimation, is 45 to 50%.   4. Mildly increased LV wall thickness.   5. Normal left ventricular internal cavity size.   6. The left ventricle endocardium is not well visualized, consider use   of IV ultrasonic enhancing agent to better evaluate regional wall motion. Abnormal septal motion which may be due to paced rhythm. The mid inferior and apical inferior walls appear hypokinetic.   7. The right ventricle is not well visualized, appears to have normal   systolic function.   8. The left atrium is not well visualized, appears generally normal in   size.   9. The right atrium is not well visualized, appears generally normal in   size.  10. Mild mitral annular calcification.  11. Mild thickening and calcification of the anterior mitral valve   leaflet.  12. Mild mitral valve regurgitation.  13. There is a bioprosthetic valve in the aortic position, appears well   seated with peak velocity within normal limits.  14. There is no evidence of pericardial effusion.    ECG (11/29/23): atrial sensed, ventricular paed   LUZ MARIA EVANS  [id number]    Chief complaint: NSTEMI, COVID    Interval events: No c.o chest pain or sob. On 4l nc. Sinus on tele 60-70s bpm with occasional PVC    ALLERGIES:  No Known Allergies      PAST MEDICAL & SURGICAL HISTORY:  Hypertension  TAVR    ROS:  All 10 systems reviewed and positives noted in HPI    OBJECTIVE:    VITAL SIGNS:  Vital Signs Last 24 Hrs  T(C): 36.6 (29 Nov 2023 13:40), Max: 36.6 (29 Nov 2023 13:40)  T(F): 97.9 (29 Nov 2023 13:40), Max: 97.9 (29 Nov 2023 13:40)  HR: 94 (29 Nov 2023 15:28) (89 - 94)  BP: 119/72 (29 Nov 2023 13:40) (119/72 - 119/72)  BP(mean): --  RR: 22 (29 Nov 2023 13:40) (22 - 22)  SpO2: 98% (29 Nov 2023 15:28) (98% - 100%)    Parameters below as of 29 Nov 2023 13:40  Patient On (Oxygen Delivery Method): mask, nonrebreather  O2 Flow (L/min): 15    PHYSICAL EXAM:  General: on 4l n/c  HEENT: sclera anicteric  Neck: supple  CVS: JVP ~ 7 cm H20, RRR, s1, s2, no murmurs  Chest: + wheeze  Abdomen: distended  Extremities: mild lower extremity edema b/l  Neuro: awake, alert & oriented  Psych: normal affect      LABS:                        10.1   6.95  )-----------( 140      ( 29 Nov 2023 14:20 )             31.1     11-29    136  |  99  |  37<H>  ----------------------------<  317<H>  4.5   |  29  |  2.08<H>    Ca    9.7      29 Nov 2023 14:20  Mg     2.1     11-29    TPro  7.7  /  Alb  3.6  /  TBili  0.4  /  DBili  x   /  AST  47<H>  /  ALT  28  /  AlkPhos  55  11-29        PT/INR - ( 29 Nov 2023 14:20 )   PT: 16.4 sec;   INR: 1.42 ratio         PTT - ( 29 Nov 2023 14:20 )  PTT:35.3 sec      TTE (1/2022):  LVEF 60-65%  TAVR    TTE (11/29/23):   1. Technically difficult study with poor endocardial visualization.   2. Mildly decreased global left ventricular systolic function.   3. Left ventricular ejection fraction, by visual estimation, is 45 to 50%.   4. Mildly increased LV wall thickness.   5. Normal left ventricular internal cavity size.   6. The left ventricle endocardium is not well visualized, consider use   of IV ultrasonic enhancing agent to better evaluate regional wall motion. Abnormal septal motion which may be due to paced rhythm. The mid inferior and apical inferior walls appear hypokinetic.   7. The right ventricle is not well visualized, appears to have normal   systolic function.   8. The left atrium is not well visualized, appears generally normal in   size.   9. The right atrium is not well visualized, appears generally normal in   size.  10. Mild mitral annular calcification.  11. Mild thickening and calcification of the anterior mitral valve   leaflet.  12. Mild mitral valve regurgitation.  13. There is a bioprosthetic valve in the aortic position, appears well   seated with peak velocity within normal limits.  14. There is no evidence of pericardial effusion.    ECG (11/29/23): atrial sensed, ventricular paed   LUZ MARIA EVANS  [id number]    Chief complaint: NSTEMI, COVID    Interval events: No c.o chest pain or sob. On 5l nc. Sinus on tele 60-70s bpm with occasional PVC    ALLERGIES:  No Known Allergies      PAST MEDICAL & SURGICAL HISTORY:  Hypertension  TAVR    ROS:  All 10 systems reviewed and positives noted in HPI    OBJECTIVE:    VITAL SIGNS:  Vital Signs Last 24 Hrs  T(C): 36.6 (29 Nov 2023 13:40), Max: 36.6 (29 Nov 2023 13:40)  T(F): 97.9 (29 Nov 2023 13:40), Max: 97.9 (29 Nov 2023 13:40)  HR: 94 (29 Nov 2023 15:28) (89 - 94)  BP: 119/72 (29 Nov 2023 13:40) (119/72 - 119/72)  BP(mean): --  RR: 22 (29 Nov 2023 13:40) (22 - 22)  SpO2: 98% (29 Nov 2023 15:28) (98% - 100%)    Parameters below as of 29 Nov 2023 13:40  Patient On (Oxygen Delivery Method): mask, nonrebreather  O2 Flow (L/min): 15    PHYSICAL EXAM:  General: on 4l n/c  HEENT: sclera anicteric  Neck: supple  CVS: JVP ~ 7 cm H20, RRR, s1, s2, no murmurs  Chest: + wheeze  Abdomen: distended  Extremities: mild lower extremity edema b/l  Neuro: awake, alert & oriented  Psych: normal affect      LABS:                        10.1   6.95  )-----------( 140      ( 29 Nov 2023 14:20 )             31.1     11-29    136  |  99  |  37<H>  ----------------------------<  317<H>  4.5   |  29  |  2.08<H>    Ca    9.7      29 Nov 2023 14:20  Mg     2.1     11-29    TPro  7.7  /  Alb  3.6  /  TBili  0.4  /  DBili  x   /  AST  47<H>  /  ALT  28  /  AlkPhos  55  11-29        PT/INR - ( 29 Nov 2023 14:20 )   PT: 16.4 sec;   INR: 1.42 ratio         PTT - ( 29 Nov 2023 14:20 )  PTT:35.3 sec      TTE (1/2022):  LVEF 60-65%  TAVR    TTE (11/29/23):   1. Technically difficult study with poor endocardial visualization.   2. Mildly decreased global left ventricular systolic function.   3. Left ventricular ejection fraction, by visual estimation, is 45 to 50%.   4. Mildly increased LV wall thickness.   5. Normal left ventricular internal cavity size.   6. The left ventricle endocardium is not well visualized, consider use   of IV ultrasonic enhancing agent to better evaluate regional wall motion. Abnormal septal motion which may be due to paced rhythm. The mid inferior and apical inferior walls appear hypokinetic.   7. The right ventricle is not well visualized, appears to have normal   systolic function.   8. The left atrium is not well visualized, appears generally normal in   size.   9. The right atrium is not well visualized, appears generally normal in   size.  10. Mild mitral annular calcification.  11. Mild thickening and calcification of the anterior mitral valve   leaflet.  12. Mild mitral valve regurgitation.  13. There is a bioprosthetic valve in the aortic position, appears well   seated with peak velocity within normal limits.  14. There is no evidence of pericardial effusion.    ECG (11/29/23): atrial sensed, ventricular paed   LUZ MARIA EVANS  [id number]    Chief complaint: NSTEMI, COVID    Interval events: No c.o chest pain or sob. On 5l nc. Sinus on tele 60-70s bpm with occasional PVC    ALLERGIES:  No Known Allergies      PAST MEDICAL & SURGICAL HISTORY:  Hypertension  TAVR    ROS:  All 10 systems reviewed and positives noted in HPI    OBJECTIVE:    VITAL SIGNS:  Vital Signs Last 24 Hrs  T(C): 36.6 (29 Nov 2023 13:40), Max: 36.6 (29 Nov 2023 13:40)  T(F): 97.9 (29 Nov 2023 13:40), Max: 97.9 (29 Nov 2023 13:40)  HR: 94 (29 Nov 2023 15:28) (89 - 94)  BP: 119/72 (29 Nov 2023 13:40) (119/72 - 119/72)  BP(mean): --  RR: 22 (29 Nov 2023 13:40) (22 - 22)  SpO2: 98% (29 Nov 2023 15:28) (98% - 100%)    Parameters below as of 29 Nov 2023 13:40  Patient On (Oxygen Delivery Method): mask, nonrebreather  O2 Flow (L/min): 15    PHYSICAL EXAM:  General: on 4l n/c  HEENT: sclera anicteric  Neck: supple  CVS: JVP ~ 7 cm H20, RRR, s1, s2, no murmurs  Chest: + wheeze  Abdomen: distended  Extremities: mild lower extremity edema b/l  Neuro: awake, alert & oriented  Psych: normal affect      LABS:                        10.1   6.95  )-----------( 140      ( 29 Nov 2023 14:20 )             31.1     11-29    136  |  99  |  37<H>  ----------------------------<  317<H>  4.5   |  29  |  2.08<H>    Ca    9.7      29 Nov 2023 14:20  Mg     2.1     11-29    TPro  7.7  /  Alb  3.6  /  TBili  0.4  /  DBili  x   /  AST  47<H>  /  ALT  28  /  AlkPhos  55  11-29        PT/INR - ( 29 Nov 2023 14:20 )   PT: 16.4 sec;   INR: 1.42 ratio         PTT - ( 29 Nov 2023 14:20 )  PTT:35.3 sec      TTE (1/2022):  LVEF 60-65%  TAVR    TTE (11/29/23):   1. Technically difficult study with poor endocardial visualization.   2. Mildly decreased global left ventricular systolic function.   3. Left ventricular ejection fraction, by visual estimation, is 45 to 50%.   4. Mildly increased LV wall thickness.   5. Normal left ventricular internal cavity size.   6. The left ventricle endocardium is not well visualized, consider use   of IV ultrasonic enhancing agent to better evaluate regional wall motion. Abnormal septal motion which may be due to paced rhythm. The mid inferior and apical inferior walls appear hypokinetic.   7. The right ventricle is not well visualized, appears to have normal   systolic function.   8. The left atrium is not well visualized, appears generally normal in   size.   9. The right atrium is not well visualized, appears generally normal in   size.  10. Mild mitral annular calcification.  11. Mild thickening and calcification of the anterior mitral valve   leaflet.  12. Mild mitral valve regurgitation.  13. There is a bioprosthetic valve in the aortic position, appears well   seated with peak velocity within normal limits.  14. There is no evidence of pericardial effusion.    ECG (11/29/23): atrial sensed, ventricular paed   LUZ MARIA EVANS  [id number]    Chief complaint: NSTEMI, COVID    Interval events: No c.o chest pain or sob. On 5l nc. Sinus on tele 60-70s bpm with occasional PVC    ALLERGIES:  No Known Allergies      PAST MEDICAL & SURGICAL HISTORY:  Hypertension  TAVR    ROS:  All 10 systems reviewed and positives noted in HPI    OBJECTIVE:    VITAL SIGNS:  Vital Signs Last 24 Hrs  T(C): 36.6 (29 Nov 2023 13:40), Max: 36.6 (29 Nov 2023 13:40)  T(F): 97.9 (29 Nov 2023 13:40), Max: 97.9 (29 Nov 2023 13:40)  HR: 94 (29 Nov 2023 15:28) (89 - 94)  BP: 119/72 (29 Nov 2023 13:40) (119/72 - 119/72)  BP(mean): --  RR: 22 (29 Nov 2023 13:40) (22 - 22)  SpO2: 98% (29 Nov 2023 15:28) (98% - 100%)    Parameters below as of 29 Nov 2023 13:40  Patient On (Oxygen Delivery Method): mask, nonrebreather  O2 Flow (L/min): 15    PHYSICAL EXAM:  General: on 4l n/c  HEENT: sclera anicteric  Neck: supple  CVS: JVP ~ 7 cm H20, RRR, s1, s2, no murmurs  Chest: + wheeze  Abdomen: distended  Extremities: mild lower extremity edema b/l  Neuro: awake, alert & oriented  Psych: normal affect      LABS:                        10.1   6.95  )-----------( 140      ( 29 Nov 2023 14:20 )             31.1     11-29    136  |  99  |  37<H>  ----------------------------<  317<H>  4.5   |  29  |  2.08<H>    Ca    9.7      29 Nov 2023 14:20  Mg     2.1     11-29    TPro  7.7  /  Alb  3.6  /  TBili  0.4  /  DBili  x   /  AST  47<H>  /  ALT  28  /  AlkPhos  55  11-29        PT/INR - ( 29 Nov 2023 14:20 )   PT: 16.4 sec;   INR: 1.42 ratio         PTT - ( 29 Nov 2023 14:20 )  PTT:35.3 sec      TTE (1/2022):  LVEF 60-65%  TAVR    TTE (11/29/23):   1. Technically difficult study with poor endocardial visualization.   2. Mildly decreased global left ventricular systolic function.   3. Left ventricular ejection fraction, by visual estimation, is 45 to 50%.   4. Mildly increased LV wall thickness.   5. Normal left ventricular internal cavity size.   6. The left ventricle endocardium is not well visualized, consider use   of IV ultrasonic enhancing agent to better evaluate regional wall motion. Abnormal septal motion which may be due to paced rhythm. The mid inferior and apical inferior walls appear hypokinetic.   7. The right ventricle is not well visualized, appears to have normal   systolic function.   8. The left atrium is not well visualized, appears generally normal in   size.   9. The right atrium is not well visualized, appears generally normal in   size.  10. Mild mitral annular calcification.  11. Mild thickening and calcification of the anterior mitral valve   leaflet.  12. Mild mitral valve regurgitation.  13. There is a bioprosthetic valve in the aortic position, appears well   seated with peak velocity within normal limits.  14. There is no evidence of pericardial effusion.    ECG (11/29/23): atrial sensed, ventricular paed

## 2023-11-30 NOTE — DIETITIAN INITIAL EVALUATION ADULT - ORAL INTAKE PTA/DIET HISTORY
Pt reports excellent appetite PTA. States that his wife prepares all meals at home. 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 Avoids sweets due to hx DMII. NKFA. No chewing/swallowing issues. Uses dentures at home (does not currently have). Independent in feeding.

## 2023-11-30 NOTE — DIETITIAN INITIAL EVALUATION ADULT - NS FNS DIET ORDER
Diet, Consistent Carbohydrate w/Evening Snack:   DASH/TLC {Sodium & Cholesterol Restricted} (11-30-23 @ 08:35)

## 2023-11-30 NOTE — DIETITIAN INITIAL EVALUATION ADULT - NSICDXPASTSURGICALHX_GEN_ALL_CORE_FT
PAST SURGICAL HISTORY:  History of appendectomy     History of inguinal hernia repair, bilateral     History of transcatheter aortic valve replacement (TAVR)

## 2023-11-30 NOTE — CONSULT NOTE ADULT - ASSESSMENT
A/P 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, type 2 DM, right lung mass presenting with SOB, found to be COVID positive with evidence of acute decompensated heart failure ad NSTEMI no cp, sob, palp, n/v  adm to ccu , was on bi pap     states feels better  on n/c 5 L - artemio well           Assessment  NSTEMI  COVID 19   CHF   Hypoxic respiratory failure  with respiratory distress requiring NIV  ALEXI unsure of baseline  H/o right upper lobe  lung nodule with second nodule in same lung-      Underlying  VHD s/p TAVR, HTN (hypertension), DM2, high chol SOB          Plan  heparin Drip, Asa, Statin, BB  Diurese PRN for hypoxia/SOB/volume   Cardio consult   ALEXI vs CKD   - neph consult   covid tx     PT, OOB        Palliative :  asked for GOC to determine HCP, case d/w ccu team in AM, chart reviewed.  Pt seen bedside this afternoon, very awake, alert, oriented, likes to talk, pleasant.   Reports breathing feels much better, he has no pain and a good appetite.  Does have a prod moist cough   Explained role of palliative care , discussed his dx, and who is his family and support, pt has a wife, 3 children, and 5 grandchildren .  See GOC note above.    Pt says his wife should be his decision  maker, but if she can't,  then his son Lake is second.  Pt's  goal is to recover from this event, and return home . He  wishes for full code status .

## 2023-11-30 NOTE — PROGRESS NOTE ADULT - ASSESSMENT
Physical Examination:  GENERAL:               Alert, Oriented, No acute distress.    HEENT:                   No JVD, Moist MM  PULM:                     Bilateral air entry, Clear to auscultation bilaterally, no significant sputum production, No Rales, No Rhonchi, +Wheezing  CVS:                         S1, S2,  +Murmur  ABD:                        Soft, nondistended, nontender, normoactive bowel sounds,   EXT:                         mild edema, nontender, No Cyanosis or Clubbing   Vascular:                Warm Extremities,    NEURO:                  Alert, oriented, interactive, nonfocal, follows commands  PSYC:                      Calm, + Insight.        Assessment  NSTEMI  COVID 19   Hypoxic respiratory failure  with respiratory distress requiring NIV  ALEXI unsure of baseline  H/o right upper lobe spiculated lung nodule with second nodule in same lung, possible stage 4 cancer   Undelrying VHD s/p TAVR, HTN (hypertension), DM2, high cholSOB (shortness of breath)     Plan  heparin Drip, Asa, Statin, BB  Diurese PRN for hypoxia/SOB/volume status  Cardio f/u  noted uptredning Trop.  ALEXI vs CKD - will get renal eval    Remdesivir, Decadron  n/c o2 to maintain sat  on empiric doxy    PT, OOB        PMD:		Josephine 		                   Notified(Date): 11/30/2023- being contacted by ACP to update  Jan 2022- cr was 1.6  Family Updated:  	Angeline [phone]	                                 Date: 11/30/2023      Sedation & Analgesia:	  Diet/Nutrition:		   Diet, Consistent Carbohydrate w/Evening Snack:   DASH/TLC Sodium & Cholesterol Restricted (11-30-23 @ 08:35) [Active]        GI PPx:			pantoprazole    Tablet 40 milliGRAM(s) Oral before breakfast      DVT Ppx:		  aspirin  chewable 81 milliGRAM(s) Oral daily  heparin  Infusion. 750 Unit(s)/Hr IV Continuous <Continuous>         Activity:		    Head of Bed:               35-45 Deg  Glycemic Control:          ascorbic acid 500 milliGRAM(s) Oral daily  dexAMETHasone  Injectable 6 milliGRAM(s) IV Push daily  insulin glargine Injectable (LANTUS) 15 Unit(s) SubCutaneous at bedtime  insulin lispro (ADMELOG) corrective regimen sliding scale   SubCutaneous four times a day before meals  multivitamin 1 Tablet(s) Oral daily  simvastatin 40 milliGRAM(s) Oral at bedtime      Lines:  CENTRAL LINE: 	[ ] YES [ x] NO	                    LOCATION:   	                       DATE INSERTED:   	                    REMOVE:  [ ] YES [ ] NO    A-LINE:  	                [ ] YES [x ] NO                      LOCATION:   	                       DATE INSERTED: 		            REMOVE:  [ ] YES [ ] NO    NEWSOME: 		        [ ] YES [ x] NO  		                                       DATE INSERTED:		            REMOVE:  [ ] YES [ ] NO      Restraints were deemed necessary to prevent removal of life-sustaining devices [  ] YES   [   x ]  NO    Disposition:  ICU Care    Goals of Care: Full code, palliative care for further discussions Physical Examination:  GENERAL:               Alert, Oriented, No acute distress.    HEENT:                   No JVD, Moist MM  PULM:                     Bilateral air entry, Clear to auscultation bilaterally, no significant sputum production, No Rales, No Rhonchi, +Wheezing  CVS:                         S1, S2,  +Murmur  ABD:                        Soft, nondistended, nontender, normoactive bowel sounds,   EXT:                         mild edema, nontender, No Cyanosis or Clubbing   Vascular:                Warm Extremities,    NEURO:                  Alert, oriented, interactive, nonfocal, follows commands  PSYC:                      Calm, + Insight.        Assessment  NSTEMI  COVID 19   Hypoxic respiratory failure  with respiratory distress requiring NIV  ALEXI unsure of baseline  H/o right upper lobe spiculated lung nodule with second nodule in same lung, possible stage 4 cancer   Undelrying VHD s/p TAVR, HTN (hypertension), DM2, high cholSOB (shortness of breath)     Plan  heparin Drip, Asa, Statin, BB  Diurese PRN for hypoxia/SOB/volume status  Cardio f/u  noted uptredning Trop.  ALEXI vs CKD - will get renal eval    Remdesivir, Decadron  n/c o2 to maintain sat  on empiric doxy    PT, OOB        PMD:		Josephine 		                   Notified(Date): 11/30/2023- being contacted by ACP to update  Jan 2022- cr was 1.6  Family Updated:  	Angeline [phone]	                                 Date: 11/30/2023      Sedation & Analgesia:	  Diet/Nutrition:		   Diet, Consistent Carbohydrate w/Evening Snack:   DASH/TLC Sodium & Cholesterol Restricted (11-30-23 @ 08:35) [Active]        GI PPx:			pantoprazole    Tablet 40 milliGRAM(s) Oral before breakfast      DVT Ppx:		  aspirin  chewable 81 milliGRAM(s) Oral daily  heparin  Infusion. 750 Unit(s)/Hr IV Continuous <Continuous>         Activity:		    Head of Bed:               35-45 Deg  Glycemic Control:          ascorbic acid 500 milliGRAM(s) Oral daily  dexAMETHasone  Injectable 6 milliGRAM(s) IV Push daily  insulin glargine Injectable (LANTUS) 15 Unit(s) SubCutaneous at bedtime  insulin lispro (ADMELOG) corrective regimen sliding scale   SubCutaneous four times a day before meals  multivitamin 1 Tablet(s) Oral daily  simvastatin 40 milliGRAM(s) Oral at bedtime      Lines:  CENTRAL LINE: 	[ ] YES [ x] NO	                    LOCATION:   	                       DATE INSERTED:   	                    REMOVE:  [ ] YES [ ] NO    A-LINE:  	                [ ] YES [x ] NO                      LOCATION:   	                       DATE INSERTED: 		            REMOVE:  [ ] YES [ ] NO    NEWSOME: 		        [ ] YES [ x] NO  		                                       DATE INSERTED:		            REMOVE:  [ ] YES [ ] NO      Restraints were deemed necessary to prevent removal of life-sustaining devices [  ] YES   [   x ]  NO    Disposition:  ICU Care    Goals of Care: Full code, palliative care for further discussions Physical Examination:  GENERAL:               Alert, Oriented, No acute distress.    HEENT:                   No JVD, Moist MM  PULM:                     Bilateral air entry, Clear to auscultation bilaterally, no significant sputum production, No Rales, No Rhonchi, +Wheezing  CVS:                         S1, S2,  +Murmur  ABD:                        Soft, nondistended, nontender, normoactive bowel sounds,   EXT:                         mild edema, nontender, No Cyanosis or Clubbing   Vascular:                Warm Extremities,    NEURO:                  Alert, oriented, interactive, nonfocal, follows commands  PSYC:                      Calm, + Insight.        Assessment  NSTEMI  COVID 19   Hypoxic respiratory failure  with respiratory distress requiring NIV  ALEXI unsure of baseline  H/o right upper lobe spiculated lung nodule with second nodule in same lung, possible stage 4 cancer   Undelrying VHD s/p TAVR, HTN (hypertension), DM2, high cholSOB (shortness of breath)     Plan  heparin Drip, Asa, Statin, BB  Diurese PRN for hypoxia/SOB/volume status  Cardio f/u  noted uptredning Trop.  ALEXI vs CKD - will get renal eval    Remdesivir, Decadron  n/c o2 to maintain sat  on empiric doxy    PT, OOB        PMD:		Josephine 		                   Notified(Date): 11/30/2023- being contacted by ACP to update  Jan 2022- cr was 1.6  Family Updated:  	Angeline [phone]	                                 Date: 11/30/2023      Sedation & Analgesia:	  Diet/Nutrition:		   Diet, Consistent Carbohydrate w/Evening Snack:   DASH/TLC Sodium & Cholesterol Restricted (11-30-23 @ 08:35) [Active]        GI PPx:			pantoprazole    Tablet 40 milliGRAM(s) Oral before breakfast      DVT Ppx:		  aspirin  chewable 81 milliGRAM(s) Oral daily  heparin  Infusion. 750 Unit(s)/Hr IV Continuous <Continuous>         Activity:		    Head of Bed:               35-45 Deg  Glycemic Control:          ascorbic acid 500 milliGRAM(s) Oral daily  dexAMETHasone  Injectable 6 milliGRAM(s) IV Push daily  insulin glargine Injectable (LANTUS) 15 Unit(s) SubCutaneous at bedtime  insulin lispro (ADMELOG) corrective regimen sliding scale   SubCutaneous four times a day before meals  multivitamin 1 Tablet(s) Oral daily  simvastatin 40 milliGRAM(s) Oral at bedtime      Lines:  CENTRAL LINE: 	[ ] YES [ x] NO	                    LOCATION:   	                       DATE INSERTED:   	                    REMOVE:  [ ] YES [ ] NO    A-LINE:  	                [ ] YES [x ] NO                      LOCATION:   	                       DATE INSERTED: 		            REMOVE:  [ ] YES [ ] NO    NEWSOME: 		        [ ] YES [ x] NO  		                                       DATE INSERTED:		            REMOVE:  [ ] YES [ ] NO      Restraints were deemed necessary to prevent removal of life-sustaining devices [  ] YES   [   x ]  NO    Disposition:  ICU Care    Goals of Care: Full code, palliative care for further discussions

## 2023-12-01 ENCOUNTER — TRANSCRIPTION ENCOUNTER (OUTPATIENT)
Age: 83
End: 2023-12-01

## 2023-12-01 VITALS
SYSTOLIC BLOOD PRESSURE: 117 MMHG | HEART RATE: 60 BPM | OXYGEN SATURATION: 100 % | RESPIRATION RATE: 15 BRPM | DIASTOLIC BLOOD PRESSURE: 57 MMHG

## 2023-12-01 LAB
ANION GAP SERPL CALC-SCNC: 7 MMOL/L — SIGNIFICANT CHANGE UP (ref 5–17)
APTT BLD: 85 SEC — HIGH (ref 24.5–35.6)
BUN SERPL-MCNC: 79 MG/DL — HIGH (ref 7–23)
CALCIUM SERPL-MCNC: 9 MG/DL — SIGNIFICANT CHANGE UP (ref 8.4–10.5)
CHLORIDE SERPL-SCNC: 102 MMOL/L — SIGNIFICANT CHANGE UP (ref 96–108)
CHLORIDE UR-SCNC: 41 MMOL/L — SIGNIFICANT CHANGE UP
CO2 SERPL-SCNC: 28 MMOL/L — SIGNIFICANT CHANGE UP (ref 22–31)
CREAT ?TM UR-MCNC: 114 MG/DL — SIGNIFICANT CHANGE UP
CREAT SERPL-MCNC: 2.78 MG/DL — HIGH (ref 0.5–1.3)
EGFR: 22 ML/MIN/1.73M2 — LOW
GLUCOSE BLDC GLUCOMTR-MCNC: 140 MG/DL — HIGH (ref 70–99)
GLUCOSE BLDC GLUCOMTR-MCNC: 168 MG/DL — HIGH (ref 70–99)
GLUCOSE BLDC GLUCOMTR-MCNC: 204 MG/DL — HIGH (ref 70–99)
GLUCOSE SERPL-MCNC: 104 MG/DL — HIGH (ref 70–99)
HCT VFR BLD CALC: 26.7 % — LOW (ref 39–50)
HGB BLD-MCNC: 8.8 G/DL — LOW (ref 13–17)
MAGNESIUM SERPL-MCNC: 2 MG/DL — SIGNIFICANT CHANGE UP (ref 1.6–2.6)
MCHC RBC-ENTMCNC: 33 GM/DL — SIGNIFICANT CHANGE UP (ref 32–36)
MCHC RBC-ENTMCNC: 34.9 PG — HIGH (ref 27–34)
MCV RBC AUTO: 106 FL — HIGH (ref 80–100)
NRBC # BLD: 0 /100 WBCS — SIGNIFICANT CHANGE UP (ref 0–0)
PHOSPHATE SERPL-MCNC: 4.5 MG/DL — SIGNIFICANT CHANGE UP (ref 2.5–4.5)
PLATELET # BLD AUTO: 126 K/UL — LOW (ref 150–400)
POTASSIUM SERPL-MCNC: 4.4 MMOL/L — SIGNIFICANT CHANGE UP (ref 3.5–5.3)
POTASSIUM SERPL-SCNC: 4.4 MMOL/L — SIGNIFICANT CHANGE UP (ref 3.5–5.3)
RBC # BLD: 2.52 M/UL — LOW (ref 4.2–5.8)
RBC # FLD: 17 % — HIGH (ref 10.3–14.5)
SODIUM SERPL-SCNC: 137 MMOL/L — SIGNIFICANT CHANGE UP (ref 135–145)
SODIUM UR-SCNC: 36 MMOL/L — SIGNIFICANT CHANGE UP
TROPONIN I, HIGH SENSITIVITY RESULT: 7676.4 NG/L — HIGH
WBC # BLD: 11.62 K/UL — HIGH (ref 3.8–10.5)
WBC # FLD AUTO: 11.62 K/UL — HIGH (ref 3.8–10.5)

## 2023-12-01 PROCEDURE — 85027 COMPLETE CBC AUTOMATED: CPT

## 2023-12-01 PROCEDURE — 96365 THER/PROPH/DIAG IV INF INIT: CPT

## 2023-12-01 PROCEDURE — 82728 ASSAY OF FERRITIN: CPT

## 2023-12-01 PROCEDURE — 83735 ASSAY OF MAGNESIUM: CPT

## 2023-12-01 PROCEDURE — 71045 X-RAY EXAM CHEST 1 VIEW: CPT | Mod: 26

## 2023-12-01 PROCEDURE — 82436 ASSAY OF URINE CHLORIDE: CPT

## 2023-12-01 PROCEDURE — 82570 ASSAY OF URINE CREATININE: CPT

## 2023-12-01 PROCEDURE — 71045 X-RAY EXAM CHEST 1 VIEW: CPT

## 2023-12-01 PROCEDURE — 84145 PROCALCITONIN (PCT): CPT

## 2023-12-01 PROCEDURE — 83036 HEMOGLOBIN GLYCOSYLATED A1C: CPT

## 2023-12-01 PROCEDURE — 36415 COLL VENOUS BLD VENIPUNCTURE: CPT

## 2023-12-01 PROCEDURE — 85379 FIBRIN DEGRADATION QUANT: CPT

## 2023-12-01 PROCEDURE — 93005 ELECTROCARDIOGRAM TRACING: CPT

## 2023-12-01 PROCEDURE — 87640 STAPH A DNA AMP PROBE: CPT

## 2023-12-01 PROCEDURE — 99232 SBSQ HOSP IP/OBS MODERATE 35: CPT

## 2023-12-01 PROCEDURE — 82962 GLUCOSE BLOOD TEST: CPT

## 2023-12-01 PROCEDURE — 83615 LACTATE (LD) (LDH) ENZYME: CPT

## 2023-12-01 PROCEDURE — 87641 MR-STAPH DNA AMP PROBE: CPT

## 2023-12-01 PROCEDURE — 96375 TX/PRO/DX INJ NEW DRUG ADDON: CPT

## 2023-12-01 PROCEDURE — 84100 ASSAY OF PHOSPHORUS: CPT

## 2023-12-01 PROCEDURE — 85025 COMPLETE CBC W/AUTO DIFF WBC: CPT

## 2023-12-01 PROCEDURE — 0225U NFCT DS DNA&RNA 21 SARSCOV2: CPT

## 2023-12-01 PROCEDURE — 94640 AIRWAY INHALATION TREATMENT: CPT

## 2023-12-01 PROCEDURE — 76775 US EXAM ABDO BACK WALL LIM: CPT

## 2023-12-01 PROCEDURE — 93306 TTE W/DOPPLER COMPLETE: CPT

## 2023-12-01 PROCEDURE — 80048 BASIC METABOLIC PNL TOTAL CA: CPT

## 2023-12-01 PROCEDURE — 85610 PROTHROMBIN TIME: CPT

## 2023-12-01 PROCEDURE — 84484 ASSAY OF TROPONIN QUANT: CPT

## 2023-12-01 PROCEDURE — 83605 ASSAY OF LACTIC ACID: CPT

## 2023-12-01 PROCEDURE — 99285 EMERGENCY DEPT VISIT HI MDM: CPT

## 2023-12-01 PROCEDURE — 85730 THROMBOPLASTIN TIME PARTIAL: CPT

## 2023-12-01 PROCEDURE — 81001 URINALYSIS AUTO W/SCOPE: CPT

## 2023-12-01 PROCEDURE — 84300 ASSAY OF URINE SODIUM: CPT

## 2023-12-01 PROCEDURE — 94660 CPAP INITIATION&MGMT: CPT

## 2023-12-01 PROCEDURE — 83880 ASSAY OF NATRIURETIC PEPTIDE: CPT

## 2023-12-01 PROCEDURE — 80053 COMPREHEN METABOLIC PANEL: CPT

## 2023-12-01 PROCEDURE — 82803 BLOOD GASES ANY COMBINATION: CPT

## 2023-12-01 RX ORDER — SIMVASTATIN 20 MG/1
1 TABLET, FILM COATED ORAL
Qty: 0 | Refills: 0 | DISCHARGE
Start: 2023-12-01

## 2023-12-01 RX ORDER — DEXAMETHASONE 0.5 MG/5ML
25 ELIXIR ORAL
Qty: 0 | Refills: 0 | DISCHARGE
Start: 2023-12-01

## 2023-12-01 RX ORDER — METOPROLOL TARTRATE 50 MG
1 TABLET ORAL
Qty: 0 | Refills: 0 | DISCHARGE
Start: 2023-12-01

## 2023-12-01 RX ORDER — HEPARIN SODIUM 5000 [USP'U]/ML
950 INJECTION INTRAVENOUS; SUBCUTANEOUS
Qty: 0 | Refills: 0 | DISCHARGE
Start: 2023-12-01

## 2023-12-01 RX ORDER — REMDESIVIR 5 MG/ML
100 INJECTION INTRAVENOUS
Qty: 0 | Refills: 0 | DISCHARGE
Start: 2023-12-01 | End: 2023-12-03

## 2023-12-01 RX ORDER — INSULIN GLARGINE 100 [IU]/ML
15 INJECTION, SOLUTION SUBCUTANEOUS
Qty: 0 | Refills: 0 | DISCHARGE
Start: 2023-12-01

## 2023-12-01 RX ORDER — INSULIN LISPRO 100/ML
0 VIAL (ML) SUBCUTANEOUS
Qty: 0 | Refills: 0 | DISCHARGE
Start: 2023-12-01

## 2023-12-01 RX ORDER — ASPIRIN/CALCIUM CARB/MAGNESIUM 324 MG
1 TABLET ORAL
Qty: 0 | Refills: 0 | DISCHARGE
Start: 2023-12-01

## 2023-12-01 RX ORDER — ACETAMINOPHEN 500 MG
2 TABLET ORAL
Qty: 0 | Refills: 0 | DISCHARGE
Start: 2023-12-01

## 2023-12-01 RX ORDER — PANTOPRAZOLE SODIUM 20 MG/1
1 TABLET, DELAYED RELEASE ORAL
Qty: 0 | Refills: 0 | DISCHARGE
Start: 2023-12-01

## 2023-12-01 RX ORDER — ALBUTEROL 90 UG/1
1 AEROSOL, METERED ORAL
Qty: 0 | Refills: 0 | DISCHARGE
Start: 2023-12-01

## 2023-12-01 RX ADMIN — Medication 4: at 11:27

## 2023-12-01 RX ADMIN — Medication 6 MILLIGRAM(S): at 05:04

## 2023-12-01 RX ADMIN — Medication 25 MILLIGRAM(S): at 17:25

## 2023-12-01 RX ADMIN — Medication 100 MILLIGRAM(S): at 17:00

## 2023-12-01 RX ADMIN — PANTOPRAZOLE SODIUM 40 MILLIGRAM(S): 20 TABLET, DELAYED RELEASE ORAL at 05:03

## 2023-12-01 RX ADMIN — Medication 81 MILLIGRAM(S): at 11:36

## 2023-12-01 RX ADMIN — Medication 2: at 07:48

## 2023-12-01 RX ADMIN — Medication 1 TABLET(S): at 11:36

## 2023-12-01 RX ADMIN — Medication 25 MILLIGRAM(S): at 05:04

## 2023-12-01 RX ADMIN — HEPARIN SODIUM 950 UNIT(S)/HR: 5000 INJECTION INTRAVENOUS; SUBCUTANEOUS at 07:47

## 2023-12-01 RX ADMIN — CHLORHEXIDINE GLUCONATE 1 APPLICATION(S): 213 SOLUTION TOPICAL at 05:05

## 2023-12-01 RX ADMIN — CEFEPIME 100 MILLIGRAM(S): 1 INJECTION, POWDER, FOR SOLUTION INTRAMUSCULAR; INTRAVENOUS at 05:04

## 2023-12-01 RX ADMIN — Medication 100 MILLIGRAM(S): at 05:04

## 2023-12-01 RX ADMIN — HEPARIN SODIUM 950 UNIT(S)/HR: 5000 INJECTION INTRAVENOUS; SUBCUTANEOUS at 00:51

## 2023-12-01 NOTE — PROGRESS NOTE ADULT - SUBJECTIVE AND OBJECTIVE BOX
Awake, alert, comfortable on NC O2    Vital Signs Last 24 Hrs  T(C): 36.6 (12-01-23 @ 16:00), Max: 36.9 (12-01-23 @ 08:00)  T(F): 97.8 (12-01-23 @ 16:00), Max: 98.4 (12-01-23 @ 08:00)  HR: 60 (12-01-23 @ 18:00) (60 - 82)  BP: 117/57 (12-01-23 @ 18:00) (96/55 - 135/106)  BP(mean): 72 (12-01-23 @ 18:00) (63 - 115)  RR: 15 (12-01-23 @ 18:00) (14 - 21)  SpO2: 100% (12-01-23 @ 18:00) (92% - 100%)    I&O's Detail    30 Nov 2023 07:01  -  01 Dec 2023 07:00  --------------------------------------------------------  IN:    Heparin Infusion: 222 mL    IV PiggyBack: 100 mL    IV PiggyBack: 100 mL    IV PiggyBack: 200 mL  Total IN: 622 mL    OUT:    Incontinent per Condom Catheter (mL): 250 mL    Voided (mL): 400 mL  Total OUT: 650 mL    s1s2  b/l air entry  soft, ND  tr edema                         8.8    11.62 )-----------( 126      ( 01 Dec 2023 05:30 )             26.7     01 Dec 2023 05:30    137    |  102    |  79     ----------------------------<  104    4.4     |  28     |  2.78     Ca    9.0        01 Dec 2023 05:30  Phos  4.5       01 Dec 2023 05:30  Mg     2.0       01 Dec 2023 05:30    TPro  7.0    /  Alb  2.9    /  TBili  0.4    /  DBili  x      /  AST  93     /  ALT  29     /  AlkPhos  43     30 Nov 2023 05:30    LIVER FUNCTIONS - ( 30 Nov 2023 05:30 )  Alb: 2.9 g/dL / Pro: 7.0 g/dL / ALK PHOS: 43 U/L / ALT: 29 U/L / AST: 93 U/L / GGT: x           PT/INR - ( 30 Nov 2023 05:30 )   PT: 15.5 sec;   INR: 1.37 ratio      acetaminophen     Tablet .. 650 milliGRAM(s) Oral every 6 hours PRN  albuterol    90 MICROgram(s) HFA Inhaler 1 Puff(s) Inhalation every 4 hours PRN  aspirin  chewable 81 milliGRAM(s) Oral daily  benzonatate 100 milliGRAM(s) Oral three times a day PRN  chlorhexidine 2% Cloths 1 Application(s) Topical <User Schedule>  dexAMETHasone  Injectable 6 milliGRAM(s) IV Push daily  doxycycline IVPB 100 milliGRAM(s) IV Intermittent every 12 hours  doxycycline IVPB      guaiFENesin  milliGRAM(s) Oral every 12 hours PRN  heparin   Injectable 6500 Unit(s) IV Push every 6 hours PRN  heparin   Injectable 3000 Unit(s) IV Push every 6 hours PRN  heparin  Infusion. 750 Unit(s)/Hr IV Continuous <Continuous>  insulin glargine Injectable (LANTUS) 15 Unit(s) SubCutaneous at bedtime  insulin lispro (ADMELOG) corrective regimen sliding scale   SubCutaneous four times a day before meals  metoprolol tartrate 25 milliGRAM(s) Oral every 12 hours  multivitamin 1 Tablet(s) Oral daily  pantoprazole    Tablet 40 milliGRAM(s) Oral before breakfast  remdesivir  IVPB 100 milliGRAM(s) IV Intermittent every 24 hours  remdesivir  IVPB   IV Intermittent   simvastatin 40 milliGRAM(s) Oral at bedtime    A/P:    ALEXI/CKD 3 iso COVID, NSTEMI, hypotension (Cr 1.73 - 9/28/23)  Avoid ACE/ARB  UA w/pr 30, otherwise bland  Renal SONO w/o hydro   F/u BMP, UO  Tx to [facility] for further management     [phone]         Awake, alert, comfortable on NC O2    Vital Signs Last 24 Hrs  T(C): 36.6 (12-01-23 @ 16:00), Max: 36.9 (12-01-23 @ 08:00)  T(F): 97.8 (12-01-23 @ 16:00), Max: 98.4 (12-01-23 @ 08:00)  HR: 60 (12-01-23 @ 18:00) (60 - 82)  BP: 117/57 (12-01-23 @ 18:00) (96/55 - 135/106)  BP(mean): 72 (12-01-23 @ 18:00) (63 - 115)  RR: 15 (12-01-23 @ 18:00) (14 - 21)  SpO2: 100% (12-01-23 @ 18:00) (92% - 100%)    I&O's Detail    30 Nov 2023 07:01  -  01 Dec 2023 07:00  --------------------------------------------------------  IN:    Heparin Infusion: 222 mL    IV PiggyBack: 100 mL    IV PiggyBack: 100 mL    IV PiggyBack: 200 mL  Total IN: 622 mL    OUT:    Incontinent per Condom Catheter (mL): 250 mL    Voided (mL): 400 mL  Total OUT: 650 mL    s1s2  b/l air entry  soft, ND  tr edema                         8.8    11.62 )-----------( 126      ( 01 Dec 2023 05:30 )             26.7     01 Dec 2023 05:30    137    |  102    |  79     ----------------------------<  104    4.4     |  28     |  2.78     Ca    9.0        01 Dec 2023 05:30  Phos  4.5       01 Dec 2023 05:30  Mg     2.0       01 Dec 2023 05:30    TPro  7.0    /  Alb  2.9    /  TBili  0.4    /  DBili  x      /  AST  93     /  ALT  29     /  AlkPhos  43     30 Nov 2023 05:30    LIVER FUNCTIONS - ( 30 Nov 2023 05:30 )  Alb: 2.9 g/dL / Pro: 7.0 g/dL / ALK PHOS: 43 U/L / ALT: 29 U/L / AST: 93 U/L / GGT: x           PT/INR - ( 30 Nov 2023 05:30 )   PT: 15.5 sec;   INR: 1.37 ratio      acetaminophen     Tablet .. 650 milliGRAM(s) Oral every 6 hours PRN  albuterol    90 MICROgram(s) HFA Inhaler 1 Puff(s) Inhalation every 4 hours PRN  aspirin  chewable 81 milliGRAM(s) Oral daily  benzonatate 100 milliGRAM(s) Oral three times a day PRN  chlorhexidine 2% Cloths 1 Application(s) Topical <User Schedule>  dexAMETHasone  Injectable 6 milliGRAM(s) IV Push daily  doxycycline IVPB 100 milliGRAM(s) IV Intermittent every 12 hours  doxycycline IVPB      guaiFENesin  milliGRAM(s) Oral every 12 hours PRN  heparin   Injectable 6500 Unit(s) IV Push every 6 hours PRN  heparin   Injectable 3000 Unit(s) IV Push every 6 hours PRN  heparin  Infusion. 750 Unit(s)/Hr IV Continuous <Continuous>  insulin glargine Injectable (LANTUS) 15 Unit(s) SubCutaneous at bedtime  insulin lispro (ADMELOG) corrective regimen sliding scale   SubCutaneous four times a day before meals  metoprolol tartrate 25 milliGRAM(s) Oral every 12 hours  multivitamin 1 Tablet(s) Oral daily  pantoprazole    Tablet 40 milliGRAM(s) Oral before breakfast  remdesivir  IVPB 100 milliGRAM(s) IV Intermittent every 24 hours  remdesivir  IVPB   IV Intermittent   simvastatin 40 milliGRAM(s) Oral at bedtime    A/P:    ALEXI/CKD 3 iso COVID, NSTEMI, hypotension (Cr 1.73 - 9/28/23)  Avoid ACE/ARB  UA w/pr 30, otherwise bland  Renal SONO w/o hydro   F/u BMP, UO  Tx to [facility] for further management     [phone]         Awake, alert, comfortable on NC O2    Vital Signs Last 24 Hrs  T(C): 36.6 (12-01-23 @ 16:00), Max: 36.9 (12-01-23 @ 08:00)  T(F): 97.8 (12-01-23 @ 16:00), Max: 98.4 (12-01-23 @ 08:00)  HR: 60 (12-01-23 @ 18:00) (60 - 82)  BP: 117/57 (12-01-23 @ 18:00) (96/55 - 135/106)  BP(mean): 72 (12-01-23 @ 18:00) (63 - 115)  RR: 15 (12-01-23 @ 18:00) (14 - 21)  SpO2: 100% (12-01-23 @ 18:00) (92% - 100%)    I&O's Detail    30 Nov 2023 07:01  -  01 Dec 2023 07:00  --------------------------------------------------------  IN:    Heparin Infusion: 222 mL    IV PiggyBack: 100 mL    IV PiggyBack: 100 mL    IV PiggyBack: 200 mL  Total IN: 622 mL    OUT:    Incontinent per Condom Catheter (mL): 250 mL    Voided (mL): 400 mL  Total OUT: 650 mL    s1s2  b/l air entry  soft, ND  tr edema                         8.8    11.62 )-----------( 126      ( 01 Dec 2023 05:30 )             26.7     01 Dec 2023 05:30    137    |  102    |  79     ----------------------------<  104    4.4     |  28     |  2.78     Ca    9.0        01 Dec 2023 05:30  Phos  4.5       01 Dec 2023 05:30  Mg     2.0       01 Dec 2023 05:30    TPro  7.0    /  Alb  2.9    /  TBili  0.4    /  DBili  x      /  AST  93     /  ALT  29     /  AlkPhos  43     30 Nov 2023 05:30    LIVER FUNCTIONS - ( 30 Nov 2023 05:30 )  Alb: 2.9 g/dL / Pro: 7.0 g/dL / ALK PHOS: 43 U/L / ALT: 29 U/L / AST: 93 U/L / GGT: x           PT/INR - ( 30 Nov 2023 05:30 )   PT: 15.5 sec;   INR: 1.37 ratio      acetaminophen     Tablet .. 650 milliGRAM(s) Oral every 6 hours PRN  albuterol    90 MICROgram(s) HFA Inhaler 1 Puff(s) Inhalation every 4 hours PRN  aspirin  chewable 81 milliGRAM(s) Oral daily  benzonatate 100 milliGRAM(s) Oral three times a day PRN  chlorhexidine 2% Cloths 1 Application(s) Topical <User Schedule>  dexAMETHasone  Injectable 6 milliGRAM(s) IV Push daily  doxycycline IVPB 100 milliGRAM(s) IV Intermittent every 12 hours  doxycycline IVPB      guaiFENesin  milliGRAM(s) Oral every 12 hours PRN  heparin   Injectable 6500 Unit(s) IV Push every 6 hours PRN  heparin   Injectable 3000 Unit(s) IV Push every 6 hours PRN  heparin  Infusion. 750 Unit(s)/Hr IV Continuous <Continuous>  insulin glargine Injectable (LANTUS) 15 Unit(s) SubCutaneous at bedtime  insulin lispro (ADMELOG) corrective regimen sliding scale   SubCutaneous four times a day before meals  metoprolol tartrate 25 milliGRAM(s) Oral every 12 hours  multivitamin 1 Tablet(s) Oral daily  pantoprazole    Tablet 40 milliGRAM(s) Oral before breakfast  remdesivir  IVPB 100 milliGRAM(s) IV Intermittent every 24 hours  remdesivir  IVPB   IV Intermittent   simvastatin 40 milliGRAM(s) Oral at bedtime    A/P:    ALEXI/CKD 3 iso COVID, NSTEMI, hypotension (Cr 1.73 - 9/28/23)  Avoid ACE/ARB  UA w/pr 30, otherwise bland  Renal SONO w/o hydro   F/u BMP, UO  Tx to [facility] for further management     [phone]

## 2023-12-01 NOTE — DISCHARGE NOTE PROVIDER - PROVIDER TOKENS
PROVIDER:[TOKEN:[[id number]]] PROVIDER:[TOKEN:[[id number]]] PROVIDER:[TOKEN:[[id number]]]

## 2023-12-01 NOTE — PROGRESS NOTE ADULT - ASSESSMENT
Physical Examination:  GENERAL:               Alert, Oriented, No acute distress.    HEENT:                   No JVD, Moist MM  PULM:                     Bilateral air entry, Clear to auscultation bilaterally, no significant sputum production, No Rales, No Rhonchi, +Wheezing  CVS:                         S1, S2,  +Murmur  ABD:                        Soft, nondistended, nontender, normoactive bowel sounds,   EXT:                         no edema, nontender, No Cyanosis or Clubbing   NEURO:                  Alert, oriented, interactive, nonfocal, follows commands  PSYC:                      Calm, + Insight.        Assessment  NSTEMI  COVID 19   Hypoxic respiratory failure  with respiratory distress requiring NIV  ALEXI unsure of baseline  H/o right upper lobe spiculated lung nodule with second nodule in same lung, possible stage 4 cancer   Undelrying VHD s/p TAVR, HTN (hypertension), DM2, high cholSOB (shortness of breath)     Plan  stop heparin Drip as now 2 days  Continue Asa, Statin, BB  hold ace/arb due to renal function  Diurese PRN for hypoxia/SOB/volume status  Cardio f/u - plan to get ischemic workup, and possible tx to [facility]  Renal f/u  hold diuresis  trend renal function   Remdesivir, Decadron  n/c o2 to maintain sat  on empiric doxy, cefepime,  will d/c cefepime today     PT, OOB        PMD:		Josephine 		                   Notified(Date): 11/30/2023- being contacted by ACP to update  Jan 2022- cr was 1.6  Family Updated:  	Angeline [phone]	                                 Date: 12/1/2023      Sedation & Analgesia:	  Diet/Nutrition:		   Diet, Consistent Carbohydrate w/Evening Snack:   DASH/TLC Sodium & Cholesterol Restricted (11-30-23 @ 08:35) [Active]        GI PPx:			pantoprazole    Tablet 40 milliGRAM(s) Oral before breakfast      DVT Ppx:		  aspirin  chewable 81 milliGRAM(s) Oral daily  heparin  Infusion. 750 Unit(s)/Hr IV Continuous <Continuous>         Activity:		    Head of Bed:               35-45 Deg  Glycemic Control:        insulin ss      Lines:  PIV     Restraints were deemed necessary to prevent removal of life-sustaining devices [  ] YES   [   x ]  NO    Disposition:  can transfer to medical floor     Goals of Care: Full code, palliative care for further discussions Physical Examination:  GENERAL:               Alert, Oriented, No acute distress.    HEENT:                   No JVD, Moist MM  PULM:                     Bilateral air entry, Clear to auscultation bilaterally, no significant sputum production, No Rales, No Rhonchi, +Wheezing  CVS:                         S1, S2,  +Murmur  ABD:                        Soft, nondistended, nontender, normoactive bowel sounds,   EXT:                         no edema, nontender, No Cyanosis or Clubbing   NEURO:                  Alert, oriented, interactive, nonfocal, follows commands  PSYC:                      Calm, + Insight.        Assessment  NSTEMI  COVID 19   Hypoxic respiratory failure  with respiratory distress requiring NIV  ALEXI unsure of baseline  H/o right upper lobe spiculated lung nodule with second nodule in same lung, possible stage 4 cancer   Undelrying VHD s/p TAVR, HTN (hypertension), DM2, high cholSOB (shortness of breath)     Plan  stop heparin Drip as now 2 days  Continue Asa, Statin, BB  hold ace/arb due to renal function  Diurese PRN for hypoxia/SOB/volume status  Cardio f/u - plan to get ischemic workup, and possible tx to   Renal f/u  hold diuresis  trend renal function   Remdesivir, Decadron  n/c o2 to maintain sat  on empiric doxy, cefepime,  will d/c cefepime today     PT, OOB        PMD:		Josephine 		                   Notified(Date): 11/30/2023- being contacted by ACP to update  Jan 2022- cr was 1.6  Family Updated:  	Angeline [phone]	                                 Date: 12/1/2023      Sedation & Analgesia:	  Diet/Nutrition:		   Diet, Consistent Carbohydrate w/Evening Snack:   DASH/TLC Sodium & Cholesterol Restricted (11-30-23 @ 08:35) [Active]        GI PPx:			pantoprazole    Tablet 40 milliGRAM(s) Oral before breakfast      DVT Ppx:		  aspirin  chewable 81 milliGRAM(s) Oral daily  heparin  Infusion. 750 Unit(s)/Hr IV Continuous <Continuous>         Activity:		    Head of Bed:               35-45 Deg  Glycemic Control:        insulin ss      Lines:  PIV     Restraints were deemed necessary to prevent removal of life-sustaining devices [  ] YES   [   x ]  NO    Disposition:  can transfer to medical floor     Goals of Care: Full code, palliative care for further discussions Physical Examination:  GENERAL:               Alert, Oriented, No acute distress.    HEENT:                   No JVD, Moist MM  PULM:                     Bilateral air entry, Clear to auscultation bilaterally, no significant sputum production, No Rales, No Rhonchi, +Wheezing  CVS:                         S1, S2,  +Murmur  ABD:                        Soft, nondistended, nontender, normoactive bowel sounds,   EXT:                         no edema, nontender, No Cyanosis or Clubbing   NEURO:                  Alert, oriented, interactive, nonfocal, follows commands  PSYC:                      Calm, + Insight.        Assessment  NSTEMI  COVID 19   Hypoxic respiratory failure  with respiratory distress requiring NIV  ALEXI unsure of baseline  H/o right upper lobe spiculated lung nodule with second nodule in same lung, possible stage 4 cancer   Undelrying VHD s/p TAVR, HTN (hypertension), DM2, high cholSOB (shortness of breath)     Plan  stop heparin Drip as now 2 days  Continue Asa, Statin, BB  hold ace/arb due to renal function  Diurese PRN for hypoxia/SOB/volume status  Cardio f/u - plan to get ischemic workup, and possible tx to [facility]  Renal f/u  hold diuresis  trend renal function   Remdesivir, Decadron  n/c o2 to maintain sat  on empiric doxy, cefepime,  will d/c cefepime today     PT, OOB        PMD:		Josephine 		                   Notified(Date): 11/30/2023- being contacted by ACP to update  Jan 2022- cr was 1.6  Family Updated:  	Angeline [phone]	                                 Date: 12/1/2023      Sedation & Analgesia:	  Diet/Nutrition:		   Diet, Consistent Carbohydrate w/Evening Snack:   DASH/TLC Sodium & Cholesterol Restricted (11-30-23 @ 08:35) [Active]        GI PPx:			pantoprazole    Tablet 40 milliGRAM(s) Oral before breakfast      DVT Ppx:		  aspirin  chewable 81 milliGRAM(s) Oral daily  heparin  Infusion. 750 Unit(s)/Hr IV Continuous <Continuous>         Activity:		    Head of Bed:               35-45 Deg  Glycemic Control:        insulin ss      Lines:  PIV     Restraints were deemed necessary to prevent removal of life-sustaining devices [  ] YES   [   x ]  NO    Disposition:  can transfer to medical floor     Goals of Care: Full code, palliative care for further discussions

## 2023-12-01 NOTE — PROGRESS NOTE ADULT - SUBJECTIVE AND OBJECTIVE BOX
LUZ MARIA EVANS  [id number]    Chief complaint: NSTEMI, COVID    Interval events: No c.o chest pain or sob. On 5l nc. Sinus on tele 60-90s bpm    ALLERGIES:  No Known Allergies      PAST MEDICAL & SURGICAL HISTORY:  Hypertension  TAVR    ROS:  All 10 systems reviewed and positives noted in HPI    OBJECTIVE:    VITAL SIGNS:  Vital Signs Last 24 Hrs  T(C): 36.6 (29 Nov 2023 13:40), Max: 36.6 (29 Nov 2023 13:40)  T(F): 97.9 (29 Nov 2023 13:40), Max: 97.9 (29 Nov 2023 13:40)  HR: 94 (29 Nov 2023 15:28) (89 - 94)  BP: 119/72 (29 Nov 2023 13:40) (119/72 - 119/72)  BP(mean): --  RR: 22 (29 Nov 2023 13:40) (22 - 22)  SpO2: 98% (29 Nov 2023 15:28) (98% - 100%)    Parameters below as of 29 Nov 2023 13:40  Patient On (Oxygen Delivery Method): mask, nonrebreather  O2 Flow (L/min): 15    PHYSICAL EXAM:  General: on 4l n/c  HEENT: sclera anicteric  Neck: supple  CVS: JVP ~ 7 cm H20, RRR, s1, s2, no murmurs  Chest: + wheeze  Abdomen: distended  Extremities: mild lower extremity edema b/l  Neuro: awake, alert & oriented  Psych: normal affect      LABS:                        10.1   6.95  )-----------( 140      ( 29 Nov 2023 14:20 )             31.1     11-29    136  |  99  |  37<H>  ----------------------------<  317<H>  4.5   |  29  |  2.08<H>    Ca    9.7      29 Nov 2023 14:20  Mg     2.1     11-29    TPro  7.7  /  Alb  3.6  /  TBili  0.4  /  DBili  x   /  AST  47<H>  /  ALT  28  /  AlkPhos  55  11-29        PT/INR - ( 29 Nov 2023 14:20 )   PT: 16.4 sec;   INR: 1.42 ratio         PTT - ( 29 Nov 2023 14:20 )  PTT:35.3 sec      TTE (1/2022):  LVEF 60-65%  TAVR    TTE (11/29/23):   1. Technically difficult study with poor endocardial visualization.   2. Mildly decreased global left ventricular systolic function.   3. Left ventricular ejection fraction, by visual estimation, is 45 to 50%.   4. Mildly increased LV wall thickness.   5. Normal left ventricular internal cavity size.   6. The left ventricle endocardium is not well visualized, consider use   of IV ultrasonic enhancing agent to better evaluate regional wall motion. Abnormal septal motion which may be due to paced rhythm. The mid inferior and apical inferior walls appear hypokinetic.   7. The right ventricle is not well visualized, appears to have normal   systolic function.   8. The left atrium is not well visualized, appears generally normal in   size.   9. The right atrium is not well visualized, appears generally normal in   size.  10. Mild mitral annular calcification.  11. Mild thickening and calcification of the anterior mitral valve   leaflet.  12. Mild mitral valve regurgitation.  13. There is a bioprosthetic valve in the aortic position, appears well   seated with peak velocity within normal limits.  14. There is no evidence of pericardial effusion.    ECG (11/29/23): atrial sensed, ventricular paed   LUZ MARIA EVANS  [id number]    Chief complaint: NSTEMI, COVID    Interval events: No c.o chest pain or sob. On 5l nc. Sinus on tele 60-90s bpm    ALLERGIES:  No Known Allergies      PAST MEDICAL & SURGICAL HISTORY:  Hypertension  TAVR    ROS:  All 10 systems reviewed and positives noted in HPI    OBJECTIVE:    VITAL SIGNS:  Vital Signs Last 24 Hrs  T(C): 36.6 (29 Nov 2023 13:40), Max: 36.6 (29 Nov 2023 13:40)  T(F): 97.9 (29 Nov 2023 13:40), Max: 97.9 (29 Nov 2023 13:40)  HR: 94 (29 Nov 2023 15:28) (89 - 94)  BP: 119/72 (29 Nov 2023 13:40) (119/72 - 119/72)  BP(mean): --  RR: 22 (29 Nov 2023 13:40) (22 - 22)  SpO2: 98% (29 Nov 2023 15:28) (98% - 100%)    Parameters below as of 29 Nov 2023 13:40  Patient On (Oxygen Delivery Method): mask, nonrebreather  O2 Flow (L/min): 15    PHYSICAL EXAM:  General: on 4l n/c  HEENT: sclera anicteric  Neck: supple  CVS: JVP ~ 7 cm H20, RRR, s1, s2, no murmurs  Chest: + wheeze  Abdomen: distended  Extremities: mild lower extremity edema b/l  Neuro: awake, alert & oriented  Psych: normal affect      LABS:                        10.1   6.95  )-----------( 140      ( 29 Nov 2023 14:20 )             31.1     11-29    136  |  99  |  37<H>  ----------------------------<  317<H>  4.5   |  29  |  2.08<H>    Ca    9.7      29 Nov 2023 14:20  Mg     2.1     11-29    TPro  7.7  /  Alb  3.6  /  TBili  0.4  /  DBili  x   /  AST  47<H>  /  ALT  28  /  AlkPhos  55  11-29        PT/INR - ( 29 Nov 2023 14:20 )   PT: 16.4 sec;   INR: 1.42 ratio         PTT - ( 29 Nov 2023 14:20 )  PTT:35.3 sec      TTE (1/2022):  LVEF 60-65%  TAVR    TTE (11/29/23):   1. Technically difficult study with poor endocardial visualization.   2. Mildly decreased global left ventricular systolic function.   3. Left ventricular ejection fraction, by visual estimation, is 45 to 50%.   4. Mildly increased LV wall thickness.   5. Normal left ventricular internal cavity size.   6. The left ventricle endocardium is not well visualized, consider use   of IV ultrasonic enhancing agent to better evaluate regional wall motion. Abnormal septal motion which may be due to paced rhythm. The mid inferior and apical inferior walls appear hypokinetic.   7. The right ventricle is not well visualized, appears to have normal   systolic function.   8. The left atrium is not well visualized, appears generally normal in   size.   9. The right atrium is not well visualized, appears generally normal in   size.  10. Mild mitral annular calcification.  11. Mild thickening and calcification of the anterior mitral valve   leaflet.  12. Mild mitral valve regurgitation.  13. There is a bioprosthetic valve in the aortic position, appears well   seated with peak velocity within normal limits.  14. There is no evidence of pericardial effusion.    ECG (11/29/23): atrial sensed, ventricular paed   LUZ MARIA EVANS  [id number]    Chief complaint: NSTEMI, COVID    Interval events: No c.o chest pain or sob. On 5l nc. Sinus on tele 60-90s bpm    ALLERGIES:  No Known Allergies      PAST MEDICAL & SURGICAL HISTORY:  Hypertension  TAVR    ROS:  All 10 systems reviewed and positives noted in HPI    OBJECTIVE:    VITAL SIGNS:  Vital Signs Last 24 Hrs  T(C): 36.6 (29 Nov 2023 13:40), Max: 36.6 (29 Nov 2023 13:40)  T(F): 97.9 (29 Nov 2023 13:40), Max: 97.9 (29 Nov 2023 13:40)  HR: 94 (29 Nov 2023 15:28) (89 - 94)  BP: 119/72 (29 Nov 2023 13:40) (119/72 - 119/72)  BP(mean): --  RR: 22 (29 Nov 2023 13:40) (22 - 22)  SpO2: 98% (29 Nov 2023 15:28) (98% - 100%)    Parameters below as of 29 Nov 2023 13:40  Patient On (Oxygen Delivery Method): mask, nonrebreather  O2 Flow (L/min): 15    PHYSICAL EXAM:  General: on 4l n/c  HEENT: sclera anicteric  Neck: supple  CVS: JVP ~ 7 cm H20, RRR, s1, s2, no murmurs  Chest: + wheeze  Abdomen: distended  Extremities: mild lower extremity edema b/l  Neuro: awake, alert & oriented  Psych: normal affect      LABS:                        10.1   6.95  )-----------( 140      ( 29 Nov 2023 14:20 )             31.1     11-29    136  |  99  |  37<H>  ----------------------------<  317<H>  4.5   |  29  |  2.08<H>    Ca    9.7      29 Nov 2023 14:20  Mg     2.1     11-29    TPro  7.7  /  Alb  3.6  /  TBili  0.4  /  DBili  x   /  AST  47<H>  /  ALT  28  /  AlkPhos  55  11-29        PT/INR - ( 29 Nov 2023 14:20 )   PT: 16.4 sec;   INR: 1.42 ratio         PTT - ( 29 Nov 2023 14:20 )  PTT:35.3 sec      TTE (1/2022):  LVEF 60-65%  TAVR    TTE (11/29/23):   1. Technically difficult study with poor endocardial visualization.   2. Mildly decreased global left ventricular systolic function.   3. Left ventricular ejection fraction, by visual estimation, is 45 to 50%.   4. Mildly increased LV wall thickness.   5. Normal left ventricular internal cavity size.   6. The left ventricle endocardium is not well visualized, consider use   of IV ultrasonic enhancing agent to better evaluate regional wall motion. Abnormal septal motion which may be due to paced rhythm. The mid inferior and apical inferior walls appear hypokinetic.   7. The right ventricle is not well visualized, appears to have normal   systolic function.   8. The left atrium is not well visualized, appears generally normal in   size.   9. The right atrium is not well visualized, appears generally normal in   size.  10. Mild mitral annular calcification.  11. Mild thickening and calcification of the anterior mitral valve   leaflet.  12. Mild mitral valve regurgitation.  13. There is a bioprosthetic valve in the aortic position, appears well   seated with peak velocity within normal limits.  14. There is no evidence of pericardial effusion.    ECG (11/29/23): atrial sensed, ventricular paed

## 2023-12-01 NOTE — DISCHARGE NOTE PROVIDER - HOSPITAL COURSE
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 Denied chest pain, fevers, chills, nausea, abdominal pain, lightheadedness, dizziness, palpitations, irregular HR, sick contacts.  Evaluation in ED revealed patient was COVID positive, but also revealed elevated troponin with evidence of acute decompensated heart failure. Started on BiPAP for work of breathing and transferred to ICU for further management.        In ICU started on treatment for COVID with remdesivir and decadron.  Started on doxycyline empirically for possibility of underlying bacterial pnuemonia.  Also started medical management for NSTEMI with Heparin infusion, statin, ASA and beta blocker, diuresed with Lasix 40mg x 1.  ACE/ARB held as patient noted to have elevated creatinine.  Improved, weaned from BiPAP to nasal cannula.  Troponin peaked at 9051 on 12/30, patient remained asymptomatic.  Troponin downtrending, creatinine rising.  TTE revealed TAVR functioning well with EF 45-50%, previously reported as normal.  Troponin currently downtrending to 7676, creatinine rising and currently 2.78 from 2.08 on admission.    Patient OOB, tolerating diet.  Continues on Heparin for NSTEMI, downgraded to telemetry bed.  Now for transfer to [facility] for further evaluation and management.      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 Denied chest pain, fevers, chills, nausea, abdominal pain, lightheadedness, dizziness, palpitations, irregular HR, sick contacts.  Evaluation in ED revealed patient was COVID positive, but also revealed elevated troponin with evidence of acute decompensated heart failure. Started on BiPAP for work of breathing and transferred to ICU for further management.        In ICU started on treatment for COVID with remdesivir and decadron.  Started on doxycyline empirically for possibility of underlying bacterial pnuemonia.  Also started medical management for NSTEMI with Heparin infusion, statin, ASA and beta blocker, diuresed with Lasix 40mg x 1.  ACE/ARB held as patient noted to have elevated creatinine.  Improved, weaned from BiPAP to nasal cannula.  Troponin peaked at 9051 on 12/30, patient remained asymptomatic.  Troponin downtrending, creatinine rising.  TTE revealed TAVR functioning well with EF 45-50%, previously reported as normal.  Troponin currently downtrending to 7676, creatinine rising and currently 2.78 from 2.08 on admission.    Patient OOB, tolerating diet.  Continues on Heparin for NSTEMI, downgraded to telemetry bed.  Now for transfer to [facility] for further evaluation and management.      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 Denied chest pain, fevers, chills, nausea, abdominal pain, lightheadedness, dizziness, palpitations, irregular HR, sick contacts.  Evaluation in ED revealed patient was COVID positive, but also revealed elevated troponin with evidence of acute decompensated heart failure. Started on BiPAP for work of breathing and transferred to ICU for further management.        In ICU started on treatment for COVID with remdesivir and decadron.  Started on doxycyline empirically for possibility of underlying bacterial pnuemonia.  Also started medical management for NSTEMI with Heparin infusion, statin, ASA and beta blocker, diuresed with Lasix 40mg x 1.  ACE/ARB held as patient noted to have elevated creatinine.  Improved, weaned from BiPAP to nasal cannula.  Troponin peaked at 9051 on 12/30, patient remained asymptomatic.  Troponin downtrending, creatinine rising.  TTE revealed TAVR functioning well with EF 45-50%, previously reported as normal.  Troponin currently downtrending to 7676, creatinine rising and currently 2.78 from 2.08 on admission.    Patient OOB, tolerating diet.  Continues on Heparin for NSTEMI, downgraded to telemetry bed.  Now for transfer to [facility] for further evaluation and management.      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 11/29 via EMS from home with history of progressive SOB associated with non-productive cough over the past several days. Denied chest pain, fevers, chills, nausea, abdominal pain, lightheadedness, dizziness, palpitations, irregular HR, sick contacts.  Evaluation in ED revealed patient was COVID positive, but also revealed elevated troponin with evidence of acute decompensated heart failure. Started on BiPAP for work of breathing and transferred to ICU for further management.        In ICU started on treatment for COVID with remdesivir and decadron.  Started on doxycyline empirically for possibility of underlying bacterial pnuemonia.  Also started medical management for NSTEMI with Heparin infusion, statin, ASA and beta blocker, diuresed with Lasix 40mg x 1.  ACE/ARB held as patient noted to have elevated creatinine.  Improved, weaned from BiPAP to nasal cannula.  Troponin peaked at 9051 on 12/30, patient remained asymptomatic.  Troponin downtrending, creatinine rising.  TTE revealed TAVR functioning well with EF 45-50%, previously reported as normal.  Troponin currently downtrending to 7676, creatinine rising and currently 2.78 from 2.08 on admission.    Patient OOB, tolerating diet.  Continues on Heparin for NSTEMI, downgraded to telemetry bed.  Now for transfer to [facility] for further evaluation and management.     For full account of hospital course please refer to actual medical records. As this is a brief summery. 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 Denied chest pain, fevers, chills, nausea, abdominal pain, lightheadedness, dizziness, palpitations, irregular HR, sick contacts.  Evaluation in ED revealed patient was COVID positive, but also revealed elevated troponin with evidence of acute decompensated heart failure. Started on BiPAP for work of breathing and transferred to ICU for further management.        In ICU started on treatment for COVID with remdesivir and decadron.  Started on doxycyline empirically for possibility of underlying bacterial pnuemonia.  Also started medical management for NSTEMI with Heparin infusion, statin, ASA and beta blocker, diuresed with Lasix 40mg x 1.  ACE/ARB held as patient noted to have elevated creatinine.  Improved, weaned from BiPAP to nasal cannula.  Troponin peaked at 9051 on 12/30, patient remained asymptomatic.  Troponin downtrending, creatinine rising.  TTE revealed TAVR functioning well with EF 45-50%, previously reported as normal.  Troponin currently downtrending to 7676, creatinine rising and currently 2.78 from 2.08 on admission.    Patient OOB, tolerating diet.  Continues on Heparin for NSTEMI, downgraded to telemetry bed.  Now for transfer to [facility] for further evaluation and management.     For full account of hospital course please refer to actual medical records. As this is a brief summery. 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 Denied chest pain, fevers, chills, nausea, abdominal pain, lightheadedness, dizziness, palpitations, irregular HR, sick contacts.  Evaluation in ED revealed patient was COVID positive, but also revealed elevated troponin with evidence of acute decompensated heart failure. Started on BiPAP for work of breathing and transferred to ICU for further management.        In ICU started on treatment for COVID with remdesivir and decadron.  Started on doxycyline empirically for possibility of underlying bacterial pnuemonia.  Also started medical management for NSTEMI with Heparin infusion, statin, ASA and beta blocker, diuresed with Lasix 40mg x 1.  ACE/ARB held as patient noted to have elevated creatinine.  Improved, weaned from BiPAP to nasal cannula.  Troponin peaked at 9051 on 12/30, patient remained asymptomatic.  Troponin downtrending, creatinine rising.  TTE revealed TAVR functioning well with EF 45-50%, previously reported as normal.  Troponin currently downtrending to 7676, creatinine rising and currently 2.78 from 2.08 on admission.    Patient OOB, tolerating diet.  Continues on Heparin for NSTEMI, downgraded to telemetry bed.  Now for transfer to [facility] for further evaluation and management.     For full account of hospital course please refer to actual medical records. As this is a brief summery.

## 2023-12-01 NOTE — DISCHARGE NOTE PROVIDER - CARE PROVIDER_API CALL
TOMASZ BATEMAN  [address] [place] [id number]  Phone: [phone]  Fax: [phone]  Follow Up Time:    TOMASZ BATEMAN  [address] [place] [address]  Phone: [phone]  Fax: [phone]  Follow Up Time:    TOMASZ BATEMAN  [address] [place] [address]  Phone: [phone]  Fax: [phone]  Follow Up Time:

## 2023-12-01 NOTE — PROGRESS NOTE ADULT - ASSESSMENT
Assessment:  Alexandra Pena is an 83 year old man with past medical history of TAVR and Hypertension who was brought in by EMS due to progressive cough and shortness of breath, found to have COVID-19 and NSTEMI.    The patient's family is present at bedside to provide additional history as patient is on BiPAP. The patient denies chest pain at this time but has had increasing cough and progressive dyspnea. ECG consistent with atrial sensed, ventricular paced rhythm. Troponins significantly elevated and echocardiogram consistent with mildly reduced LVEF 45-50% with apical and mid inferior hypokinesis, may be secondary to Type I NSTEMI vs myocarditis from COVID infection, troponin also may be further elevated from renal dysfunction. Pro BNP significantly elevated and CXR consistent with left lower lobe pneumonia.    Recommendations:  [] COVID-19 pneumonia: S/p BiPAP. tx for COVID as per primary team.  [] NSTEMI: s/p heparin drip for 48 hrs total for possible ACS. s/p Aspirin 325 mg x1, continue Aspirin 81 mg daily. Continue home beta blocker. trop peaked 11/30 8105> 7676. Continue to monitor on telemetry. Pt would benefit from ischemic eval, May also need evaluation for VTE.    Pt with elevating proBNP, s/p IV lasix x 1. Consider diuresis as kidney function allows. Renal consulted    The patient follows with cardiologist, Dr. Haley at [facility]. Pt for transfer to [facility] for ongoing management as pt is a high rx due to amira, anemia    Pamela Reyes [facility]       Assessment:  Alexandra Pena is an 83 year old man with past medical history of TAVR and Hypertension who was brought in by EMS due to progressive cough and shortness of breath, found to have COVID-19 and NSTEMI.    The patient's family is present at bedside to provide additional history as patient is on BiPAP. The patient denies chest pain at this time but has had increasing cough and progressive dyspnea. ECG consistent with atrial sensed, ventricular paced rhythm. Troponins significantly elevated and echocardiogram consistent with mildly reduced LVEF 45-50% with apical and mid inferior hypokinesis, may be secondary to Type I NSTEMI vs myocarditis from COVID infection, troponin also may be further elevated from renal dysfunction. Pro BNP significantly elevated and CXR consistent with left lower lobe pneumonia.    Recommendations:  [] COVID-19 pneumonia: S/p BiPAP. tx for COVID as per primary team.  [] NSTEMI: s/p heparin drip for 48 hrs total for possible ACS. s/p Aspirin 325 mg x1, continue Aspirin 81 mg daily. Continue home beta blocker. trop peaked 11/30 8105> 7676. Continue to monitor on telemetry. Pt would benefit from ischemic eval, May also need evaluation for VTE.    Pt with elevating proBNP, s/p IV lasix x 1. Consider diuresis as kidney function allows. Renal consulted    The patient follows with cardiologist, Dr. Haley at [facility]. Pt for transfer to [facility] for ongoing management as pt is a high rx due to amira, anemia    Pamela Reyes [facility]       Assessment:  Alexandra Pena is an 83 year old man with past medical history of TAVR and Hypertension who was brought in by EMS due to progressive cough and shortness of breath, found to have COVID-19 and NSTEMI.    The patient's family is present at bedside to provide additional history as patient is on BiPAP. The patient denies chest pain at this time but has had increasing cough and progressive dyspnea. ECG consistent with atrial sensed, ventricular paced rhythm. Troponins significantly elevated and echocardiogram consistent with mildly reduced LVEF 45-50% with apical and mid inferior hypokinesis, may be secondary to Type I NSTEMI vs myocarditis from COVID infection, troponin also may be further elevated from renal dysfunction. Pro BNP significantly elevated and CXR consistent with left lower lobe pneumonia.    Recommendations:  [] COVID-19 pneumonia: S/p BiPAP. tx for COVID as per primary team.  [] NSTEMI: s/p heparin drip for 48 hrs total for possible ACS. s/p Aspirin 325 mg x1, continue Aspirin 81 mg daily. Continue home beta blocker. trop peaked 11/30 8105> 7676. Continue to monitor on telemetry. Pt would benefit from ischemic eval, May also need evaluation for VTE.    Pt with elevating proBNP, s/p IV lasix x 1. Consider diuresis as kidney function allows. Renal consulted    The patient follows with cardiologist, Dr. Haley at [facility]. Pt for transfer to [facility] for ongoing management as pt is a high rx due to amira, anemia    Pamela Reyes [facility]

## 2023-12-01 NOTE — DISCHARGE NOTE NURSING/CASE MANAGEMENT/SOCIAL WORK - NSDCPEFALRISK_GEN_ALL_CORE
For information on Fall & Injury Prevention, visit: [email or web address] OR  [email or web address] OR  https://www.cdc.gov/steadi/patient.html For information on Fall & Injury Prevention, visit: [email or web address]/[email or web address] OR  [email or web address]://[email or web address]-[email or web address]-[email or web address]-injury OR  https://www.cdc.gov/steadi/patient.html For information on Fall & Injury Prevention, visit: [email or web address] OR  [email or web address] OR  https://www.cdc.gov/steadi/patient.html

## 2023-12-01 NOTE — PROGRESS NOTE ADULT - SUBJECTIVE AND OBJECTIVE BOX
Follow-up Critical Care Progress Note  Chief Complaint : Acute on chronic systolic congestive heart failure      patient seen and examined  comfortable  no cp, sob, palp, n/v, cough, secretions  states feeling better      Allergies :No Known Allergies      PAST MEDICAL & SURGICAL HISTORY:  HTN (hypertension)    Aortic stenosis    Type 2 diabetes mellitus    Lung mass    Dyslipidemia    History of transcatheter aortic valve replacement (TAVR)    History of inguinal hernia repair, bilateral    History of appendectomy        Medications:  MEDICATIONS  (STANDING):  aspirin  chewable 81 milliGRAM(s) Oral daily  cefepime   IVPB 2000 milliGRAM(s) IV Intermittent every 12 hours  chlorhexidine 2% Cloths 1 Application(s) Topical <User Schedule>  dexAMETHasone  Injectable 6 milliGRAM(s) IV Push daily  doxycycline IVPB 100 milliGRAM(s) IV Intermittent every 12 hours  doxycycline IVPB      heparin  Infusion. 750 Unit(s)/Hr (7.5 mL/Hr) IV Continuous <Continuous>  insulin glargine Injectable (LANTUS) 15 Unit(s) SubCutaneous at bedtime  insulin lispro (ADMELOG) corrective regimen sliding scale   SubCutaneous four times a day before meals  metoprolol tartrate 25 milliGRAM(s) Oral every 12 hours  multivitamin 1 Tablet(s) Oral daily  pantoprazole    Tablet 40 milliGRAM(s) Oral before breakfast  remdesivir  IVPB 100 milliGRAM(s) IV Intermittent every 24 hours  remdesivir  IVPB   IV Intermittent   simvastatin 40 milliGRAM(s) Oral at bedtime    MEDICATIONS  (PRN):  acetaminophen     Tablet .. 650 milliGRAM(s) Oral every 6 hours PRN Temp greater or equal to 38C (100.4F), Mild Pain (1 - 3)  albuterol    90 MICROgram(s) HFA Inhaler 1 Puff(s) Inhalation every 4 hours PRN Shortness of Breath and/or Wheezing  benzonatate 100 milliGRAM(s) Oral three times a day PRN Cough  guaiFENesin  milliGRAM(s) Oral every 12 hours PRN Cough  heparin   Injectable 6500 Unit(s) IV Push every 6 hours PRN For aPTT less than 40  heparin   Injectable 3000 Unit(s) IV Push every 6 hours PRN For aPTT between 40 - 57      Antibiotics History  cefepime   IVPB 2000 milliGRAM(s) IV Intermittent once, 11-29-23 @ 15:21, Stop order after: 1 Doses  cefepime   IVPB 2000 milliGRAM(s) IV Intermittent every 12 hours, 11-29-23 @ 18:22  doxycycline IVPB 100 milliGRAM(s) IV Intermittent every 12 hours, 11-30-23 @ 06:00  doxycycline IVPB 100 milliGRAM(s) IV Intermittent once, 11-29-23 @ 22:19  doxycycline IVPB    , 11-29-23 @ 22:38  remdesivir  IVPB   IV Intermittent , 11-29-23 @ 18:21  remdesivir  IVPB 200 milliGRAM(s) IV Intermittent every 24 hours, 11-29-23 @ 18:33, Stop order after: 1 Doses  remdesivir  IVPB 100 milliGRAM(s) IV Intermittent every 24 hours, 11-30-23 @ 23:51, Stop order after: 4 Doses      Heme Medications   aspirin  chewable 81 milliGRAM(s) Oral daily, 11-30-23 @ 00:00  heparin   Injectable 6500 Unit(s) IV Push every 6 hours, 11-30-23 @ 03:10 PRN  heparin   Injectable 3000 Unit(s) IV Push every 6 hours, 11-30-23 @ 03:10 PRN  heparin  Infusion. 750 Unit(s)/Hr IV Continuous <Continuous>, 11-30-23 @ 03:10      GI Medications  pantoprazole    Tablet 40 milliGRAM(s) Oral before breakfast, 11-29-23 @ 18:24, Routine      COVID  11-29-23 @ 14:20  COVID -   Detected<!>      COVID Biomarkers    11-30-23 @ 09:50 ESR --  ---  CRP --  ---  DDimer  297<H>   ---   <H>   ---   Ferritin 240       Trend Cardiac Enzymes  12-01-23 @ 05:30  ALK-EWVSQ-TBHOH-CPKMM/Trop I - -- - --  - --  -  --  /  7676.4<H>  11-30-23 @ 23:19  ABJ-CQNGV-VXBOP-CPKMM/Trop I - -- - --  - --  -  --  /  8105.2<H>  11-30-23 @ 17:15  LBG-SCEPP-KHFFM-CPKMM/Trop I - -- - --  - --  -  --  /  7999.4<H>  11-30-23 @ 09:50  KFL-GHIZG-PEMYK-CPKMM/Trop I - -- - --  - --  -  --  /  9050.6<H>  11-30-23 @ 05:30  NFG-WWVEV-XMECD-CPKMM/Trop I - -- - --  - --  -  --  /  7121.6<H>  11-29-23 @ 16:00  JAY-KKXGB-JJYMY-CPKMM/Trop I - -- - --  - --  -  --  /  4762.3<H>    Trend BNP  11-30-23 @ 05:30   -  23164<H>  11-29-23 @ 14:20   -  86363<H>    Procalcitonin Trend  11-30-23 @ 09:50   -   0.47<H>    WBC Trend  12-01-23 @ 05:30   -  11.62<H>  11-30-23 @ 09:50   -  9.39  11-30-23 @ 05:30   -  7.92  11-30-23 @ 01:10   -  6.68  11-29-23 @ 14:20   -  6.95    H/H Trend  12-01-23 @ 05:30   -   8.8<L>/ 26.7<L>  11-30-23 @ 09:50   -   8.9<L>/ 27.2<L>  11-30-23 @ 05:30   -   8.8<L>/ 27.0<L>  11-30-23 @ 01:10   -   8.4<L>/ 25.4<L>  11-29-23 @ 14:20   -   10.1<L>/ 31.1<L>     Platelet Trend  12-01-23 @ 05:30   -  126<L>  11-30-23 @ 09:50   -  124<L>  11-30-23 @ 05:30   -  120<L>  11-30-23 @ 01:10   -  115<L>  11-29-23 @ 14:20   -  140<L>    Trend Sodium  12-01-23 @ 05:30   -  137  11-30-23 @ 05:30   -  138  11-29-23 @ 14:20   -  136    Trend Potassium  12-01-23 @ 05:30   -  4.4  11-30-23 @ 05:30   -  5.1  11-29-23 @ 14:20   -  4.5    Trend Bun/Cr  12-01-23 @ 05:30  BUN/CR -  79<H> / 2.78<H>  11-30-23 @ 05:30  BUN/CR -  45<H> / 2.11<H>  11-29-23 @ 14:20  BUN/CR -  37<H> / 2.08<H>    Lactic Acid Trend  11-29-23 @ 14:20   -   1.9    ABG Trend    Trend AST/ALT/ALK Phos/Bili  11-30-23 @ 05:30   93<H>/29/43/0.4  11-29-23 @ 14:20   47<H>/28/55/0.4       Albumin Trend  11-30-23 @ 05:30   -   2.9<L>  11-29-23 @ 14:20   -   3.6      PTT - PT - INR Trend  12-01-23 @ 00:20   -   85.0<H> - -- - --  11-30-23 @ 17:15   -   80.5<H> - -- - --  11-30-23 @ 09:50   -   54.7<H> - -- - --  11-30-23 @ 05:30   -   58.1<H> - 15.5<H> - 1.37<H>  11-30-23 @ 01:10   -   104.1<H> - -- - --  11-29-23 @ 14:20   -   35.3 - 16.4<H> - 1.42<H>    Glucose Trend  12-01-23 @ 07:46   -  -- -- 168<H>  12-01-23 @ 05:30   -  104<H> -- --  11-30-23 @ 21:22   -  -- -- 265<H>  11-30-23 @ 17:15   -  -- -- 187<H>  11-30-23 @ 12:56   -  -- -- 147<H>  11-30-23 @ 05:30   -  226<H> -- --  11-30-23 @ 05:18   -  -- -- 225<H>  11-29-23 @ 23:49   -  -- -- 439<H>  11-29-23 @ 23:41   -  -- -- 403<H>  11-29-23 @ 14:20   -  317<H> -- --    A1C with Estimated Average Glucose Result: 7.4 % *H* [4.0 - 5.6] (11-30-23 @ 05:30)      LABS:                        8.8    11.62 )-----------( 126      ( 01 Dec 2023 05:30 )             26.7     12-01    137  |  102  |  79<H>  ----------------------------<  104<H>  4.4   |  28  |  2.78<H>    Ca    9.0      01 Dec 2023 05:30  Phos  4.5     12-01  Mg     2.0     12-01    TPro  7.0  /  Alb  2.9<L>  /  TBili  0.4  /  DBili  x   /  AST  93<H>  /  ALT  29  /  AlkPhos  43  11-30     cxr   Right middle lobe infiltrate       VITALS:  T(C): 36.9 (12-01-23 @ 08:00), Max: 36.9 (11-30-23 @ 12:00)  T(F): 98.4 (12-01-23 @ 08:00), Max: 98.5 (11-30-23 @ 12:00)  HR: 66 (12-01-23 @ 10:00) (60 - 91)  BP: 112/81 (12-01-23 @ 10:00) (87/43 - 135/106)  BP(mean): 88 (12-01-23 @ 10:00) (56 - 115)  ABP: --  ABP(mean): --  RR: 19 (12-01-23 @ 10:00) (13 - 21)  SpO2: 98% (12-01-23 @ 10:00) (92% - 100%)  CVP(mm Hg): --  CVP(cm H2O): --    Ins and Outs     11-30-23 @ 07:01  -  12-01-23 @ 07:00  --------------------------------------------------------  IN: 612.5 mL / OUT: 650 mL / NET: -37.5 mL          Weight (kg): 83.6 (11-29-23 @ 21:50)        I&O's Detail    30 Nov 2023 07:01  -  01 Dec 2023 07:00  --------------------------------------------------------  IN:    Heparin Infusion: 212.5 mL    IV PiggyBack: 100 mL    IV PiggyBack: 100 mL    IV PiggyBack: 200 mL  Total IN: 612.5 mL    OUT:    Incontinent per Condom Catheter (mL): 250 mL    Voided (mL): 400 mL  Total OUT: 650 mL    Total NET: -37.5 mL    Follow-up Critical Care Progress Note  Chief Complaint : Acute on chronic systolic congestive heart failure      patient seen and examined  comfortable  no cp, sob, palp, n/v, cough, secretions  states feeling better      Allergies :No Known Allergies      PAST MEDICAL & SURGICAL HISTORY:  HTN (hypertension)    Aortic stenosis    Type 2 diabetes mellitus    Lung mass    Dyslipidemia    History of transcatheter aortic valve replacement (TAVR)    History of inguinal hernia repair, bilateral    History of appendectomy        Medications:  MEDICATIONS  (STANDING):  aspirin  chewable 81 milliGRAM(s) Oral daily  cefepime   IVPB 2000 milliGRAM(s) IV Intermittent every 12 hours  chlorhexidine 2% Cloths 1 Application(s) Topical <User Schedule>  dexAMETHasone  Injectable 6 milliGRAM(s) IV Push daily  doxycycline IVPB 100 milliGRAM(s) IV Intermittent every 12 hours  doxycycline IVPB      heparin  Infusion. 750 Unit(s)/Hr (7.5 mL/Hr) IV Continuous <Continuous>  insulin glargine Injectable (LANTUS) 15 Unit(s) SubCutaneous at bedtime  insulin lispro (ADMELOG) corrective regimen sliding scale   SubCutaneous four times a day before meals  metoprolol tartrate 25 milliGRAM(s) Oral every 12 hours  multivitamin 1 Tablet(s) Oral daily  pantoprazole    Tablet 40 milliGRAM(s) Oral before breakfast  remdesivir  IVPB 100 milliGRAM(s) IV Intermittent every 24 hours  remdesivir  IVPB   IV Intermittent   simvastatin 40 milliGRAM(s) Oral at bedtime    MEDICATIONS  (PRN):  acetaminophen     Tablet .. 650 milliGRAM(s) Oral every 6 hours PRN Temp greater or equal to 38C (100.4F), Mild Pain (1 - 3)  albuterol    90 MICROgram(s) HFA Inhaler 1 Puff(s) Inhalation every 4 hours PRN Shortness of Breath and/or Wheezing  benzonatate 100 milliGRAM(s) Oral three times a day PRN Cough  guaiFENesin  milliGRAM(s) Oral every 12 hours PRN Cough  heparin   Injectable 6500 Unit(s) IV Push every 6 hours PRN For aPTT less than 40  heparin   Injectable 3000 Unit(s) IV Push every 6 hours PRN For aPTT between 40 - 57      Antibiotics History  cefepime   IVPB 2000 milliGRAM(s) IV Intermittent once, 11-29-23 @ 15:21, Stop order after: 1 Doses  cefepime   IVPB 2000 milliGRAM(s) IV Intermittent every 12 hours, 11-29-23 @ 18:22  doxycycline IVPB 100 milliGRAM(s) IV Intermittent every 12 hours, 11-30-23 @ 06:00  doxycycline IVPB 100 milliGRAM(s) IV Intermittent once, 11-29-23 @ 22:19  doxycycline IVPB    , 11-29-23 @ 22:38  remdesivir  IVPB   IV Intermittent , 11-29-23 @ 18:21  remdesivir  IVPB 200 milliGRAM(s) IV Intermittent every 24 hours, 11-29-23 @ 18:33, Stop order after: 1 Doses  remdesivir  IVPB 100 milliGRAM(s) IV Intermittent every 24 hours, 11-30-23 @ 23:51, Stop order after: 4 Doses      Heme Medications   aspirin  chewable 81 milliGRAM(s) Oral daily, 11-30-23 @ 00:00  heparin   Injectable 6500 Unit(s) IV Push every 6 hours, 11-30-23 @ 03:10 PRN  heparin   Injectable 3000 Unit(s) IV Push every 6 hours, 11-30-23 @ 03:10 PRN  heparin  Infusion. 750 Unit(s)/Hr IV Continuous <Continuous>, 11-30-23 @ 03:10      GI Medications  pantoprazole    Tablet 40 milliGRAM(s) Oral before breakfast, 11-29-23 @ 18:24, Routine      COVID  11-29-23 @ 14:20  COVID -   Detected<!>      COVID Biomarkers    11-30-23 @ 09:50 ESR --  ---  CRP --  ---  DDimer  297<H>   ---   <H>   ---   Ferritin 240       Trend Cardiac Enzymes  12-01-23 @ 05:30  SFY-ATRFD-JJVYH-CPKMM/Trop I - -- - --  - --  -  --  /  7676.4<H>  11-30-23 @ 23:19  JUM-TXKET-LEIJA-CPKMM/Trop I - -- - --  - --  -  --  /  8105.2<H>  11-30-23 @ 17:15  WLT-ERRUZ-MDAUN-CPKMM/Trop I - -- - --  - --  -  --  /  7999.4<H>  11-30-23 @ 09:50  SIC-GGXMF-GLNYN-CPKMM/Trop I - -- - --  - --  -  --  /  9050.6<H>  11-30-23 @ 05:30  TQO-VHKXM-RSGSQ-CPKMM/Trop I - -- - --  - --  -  --  /  7121.6<H>  11-29-23 @ 16:00  IIZ-NOMZC-VOMNP-CPKMM/Trop I - -- - --  - --  -  --  /  4762.3<H>    Trend BNP  11-30-23 @ 05:30   -  99256<H>  11-29-23 @ 14:20   -  20968<H>    Procalcitonin Trend  11-30-23 @ 09:50   -   0.47<H>    WBC Trend  12-01-23 @ 05:30   -  11.62<H>  11-30-23 @ 09:50   -  9.39  11-30-23 @ 05:30   -  7.92  11-30-23 @ 01:10   -  6.68  11-29-23 @ 14:20   -  6.95    H/H Trend  12-01-23 @ 05:30   -   8.8<L>/ 26.7<L>  11-30-23 @ 09:50   -   8.9<L>/ 27.2<L>  11-30-23 @ 05:30   -   8.8<L>/ 27.0<L>  11-30-23 @ 01:10   -   8.4<L>/ 25.4<L>  11-29-23 @ 14:20   -   10.1<L>/ 31.1<L>     Platelet Trend  12-01-23 @ 05:30   -  126<L>  11-30-23 @ 09:50   -  124<L>  11-30-23 @ 05:30   -  120<L>  11-30-23 @ 01:10   -  115<L>  11-29-23 @ 14:20   -  140<L>    Trend Sodium  12-01-23 @ 05:30   -  137  11-30-23 @ 05:30   -  138  11-29-23 @ 14:20   -  136    Trend Potassium  12-01-23 @ 05:30   -  4.4  11-30-23 @ 05:30   -  5.1  11-29-23 @ 14:20   -  4.5    Trend Bun/Cr  12-01-23 @ 05:30  BUN/CR -  79<H> / 2.78<H>  11-30-23 @ 05:30  BUN/CR -  45<H> / 2.11<H>  11-29-23 @ 14:20  BUN/CR -  37<H> / 2.08<H>    Lactic Acid Trend  11-29-23 @ 14:20   -   1.9    ABG Trend    Trend AST/ALT/ALK Phos/Bili  11-30-23 @ 05:30   93<H>/29/43/0.4  11-29-23 @ 14:20   47<H>/28/55/0.4       Albumin Trend  11-30-23 @ 05:30   -   2.9<L>  11-29-23 @ 14:20   -   3.6      PTT - PT - INR Trend  12-01-23 @ 00:20   -   85.0<H> - -- - --  11-30-23 @ 17:15   -   80.5<H> - -- - --  11-30-23 @ 09:50   -   54.7<H> - -- - --  11-30-23 @ 05:30   -   58.1<H> - 15.5<H> - 1.37<H>  11-30-23 @ 01:10   -   104.1<H> - -- - --  11-29-23 @ 14:20   -   35.3 - 16.4<H> - 1.42<H>    Glucose Trend  12-01-23 @ 07:46   -  -- -- 168<H>  12-01-23 @ 05:30   -  104<H> -- --  11-30-23 @ 21:22   -  -- -- 265<H>  11-30-23 @ 17:15   -  -- -- 187<H>  11-30-23 @ 12:56   -  -- -- 147<H>  11-30-23 @ 05:30   -  226<H> -- --  11-30-23 @ 05:18   -  -- -- 225<H>  11-29-23 @ 23:49   -  -- -- 439<H>  11-29-23 @ 23:41   -  -- -- 403<H>  11-29-23 @ 14:20   -  317<H> -- --    A1C with Estimated Average Glucose Result: 7.4 % *H* [4.0 - 5.6] (11-30-23 @ 05:30)      LABS:                        8.8    11.62 )-----------( 126      ( 01 Dec 2023 05:30 )             26.7     12-01    137  |  102  |  79<H>  ----------------------------<  104<H>  4.4   |  28  |  2.78<H>    Ca    9.0      01 Dec 2023 05:30  Phos  4.5     12-01  Mg     2.0     12-01    TPro  7.0  /  Alb  2.9<L>  /  TBili  0.4  /  DBili  x   /  AST  93<H>  /  ALT  29  /  AlkPhos  43  11-30     cxr   Right middle lobe infiltrate       VITALS:  T(C): 36.9 (12-01-23 @ 08:00), Max: 36.9 (11-30-23 @ 12:00)  T(F): 98.4 (12-01-23 @ 08:00), Max: 98.5 (11-30-23 @ 12:00)  HR: 66 (12-01-23 @ 10:00) (60 - 91)  BP: 112/81 (12-01-23 @ 10:00) (87/43 - 135/106)  BP(mean): 88 (12-01-23 @ 10:00) (56 - 115)  ABP: --  ABP(mean): --  RR: 19 (12-01-23 @ 10:00) (13 - 21)  SpO2: 98% (12-01-23 @ 10:00) (92% - 100%)  CVP(mm Hg): --  CVP(cm H2O): --    Ins and Outs     11-30-23 @ 07:01  -  12-01-23 @ 07:00  --------------------------------------------------------  IN: 612.5 mL / OUT: 650 mL / NET: -37.5 mL          Weight (kg): 83.6 (11-29-23 @ 21:50)        I&O's Detail    30 Nov 2023 07:01  -  01 Dec 2023 07:00  --------------------------------------------------------  IN:    Heparin Infusion: 212.5 mL    IV PiggyBack: 100 mL    IV PiggyBack: 100 mL    IV PiggyBack: 200 mL  Total IN: 612.5 mL    OUT:    Incontinent per Condom Catheter (mL): 250 mL    Voided (mL): 400 mL  Total OUT: 650 mL    Total NET: -37.5 mL    Follow-up Critical Care Progress Note  Chief Complaint : Acute on chronic systolic congestive heart failure      patient seen and examined  comfortable  no cp, sob, palp, n/v, cough, secretions  states feeling better      Allergies :No Known Allergies      PAST MEDICAL & SURGICAL HISTORY:  HTN (hypertension)    Aortic stenosis    Type 2 diabetes mellitus    Lung mass    Dyslipidemia    History of transcatheter aortic valve replacement (TAVR)    History of inguinal hernia repair, bilateral    History of appendectomy        Medications:  MEDICATIONS  (STANDING):  aspirin  chewable 81 milliGRAM(s) Oral daily  cefepime   IVPB 2000 milliGRAM(s) IV Intermittent every 12 hours  chlorhexidine 2% Cloths 1 Application(s) Topical <User Schedule>  dexAMETHasone  Injectable 6 milliGRAM(s) IV Push daily  doxycycline IVPB 100 milliGRAM(s) IV Intermittent every 12 hours  doxycycline IVPB      heparin  Infusion. 750 Unit(s)/Hr (7.5 mL/Hr) IV Continuous <Continuous>  insulin glargine Injectable (LANTUS) 15 Unit(s) SubCutaneous at bedtime  insulin lispro (ADMELOG) corrective regimen sliding scale   SubCutaneous four times a day before meals  metoprolol tartrate 25 milliGRAM(s) Oral every 12 hours  multivitamin 1 Tablet(s) Oral daily  pantoprazole    Tablet 40 milliGRAM(s) Oral before breakfast  remdesivir  IVPB 100 milliGRAM(s) IV Intermittent every 24 hours  remdesivir  IVPB   IV Intermittent   simvastatin 40 milliGRAM(s) Oral at bedtime    MEDICATIONS  (PRN):  acetaminophen     Tablet .. 650 milliGRAM(s) Oral every 6 hours PRN Temp greater or equal to 38C (100.4F), Mild Pain (1 - 3)  albuterol    90 MICROgram(s) HFA Inhaler 1 Puff(s) Inhalation every 4 hours PRN Shortness of Breath and/or Wheezing  benzonatate 100 milliGRAM(s) Oral three times a day PRN Cough  guaiFENesin  milliGRAM(s) Oral every 12 hours PRN Cough  heparin   Injectable 6500 Unit(s) IV Push every 6 hours PRN For aPTT less than 40  heparin   Injectable 3000 Unit(s) IV Push every 6 hours PRN For aPTT between 40 - 57      Antibiotics History  cefepime   IVPB 2000 milliGRAM(s) IV Intermittent once, 11-29-23 @ 15:21, Stop order after: 1 Doses  cefepime   IVPB 2000 milliGRAM(s) IV Intermittent every 12 hours, 11-29-23 @ 18:22  doxycycline IVPB 100 milliGRAM(s) IV Intermittent every 12 hours, 11-30-23 @ 06:00  doxycycline IVPB 100 milliGRAM(s) IV Intermittent once, 11-29-23 @ 22:19  doxycycline IVPB    , 11-29-23 @ 22:38  remdesivir  IVPB   IV Intermittent , 11-29-23 @ 18:21  remdesivir  IVPB 200 milliGRAM(s) IV Intermittent every 24 hours, 11-29-23 @ 18:33, Stop order after: 1 Doses  remdesivir  IVPB 100 milliGRAM(s) IV Intermittent every 24 hours, 11-30-23 @ 23:51, Stop order after: 4 Doses      Heme Medications   aspirin  chewable 81 milliGRAM(s) Oral daily, 11-30-23 @ 00:00  heparin   Injectable 6500 Unit(s) IV Push every 6 hours, 11-30-23 @ 03:10 PRN  heparin   Injectable 3000 Unit(s) IV Push every 6 hours, 11-30-23 @ 03:10 PRN  heparin  Infusion. 750 Unit(s)/Hr IV Continuous <Continuous>, 11-30-23 @ 03:10      GI Medications  pantoprazole    Tablet 40 milliGRAM(s) Oral before breakfast, 11-29-23 @ 18:24, Routine      COVID  11-29-23 @ 14:20  COVID -   Detected<!>      COVID Biomarkers    11-30-23 @ 09:50 ESR --  ---  CRP --  ---  DDimer  297<H>   ---   <H>   ---   Ferritin 240       Trend Cardiac Enzymes  12-01-23 @ 05:30  VKU-TUZHI-GLQQF-CPKMM/Trop I - -- - --  - --  -  --  /  7676.4<H>  11-30-23 @ 23:19  MUJ-OGBVP-UKLXB-CPKMM/Trop I - -- - --  - --  -  --  /  8105.2<H>  11-30-23 @ 17:15  GFU-EVZJX-SKPSJ-CPKMM/Trop I - -- - --  - --  -  --  /  7999.4<H>  11-30-23 @ 09:50  TZW-JDGXN-SUKYC-CPKMM/Trop I - -- - --  - --  -  --  /  9050.6<H>  11-30-23 @ 05:30  LVZ-SCNMY-QIOKJ-CPKMM/Trop I - -- - --  - --  -  --  /  7121.6<H>  11-29-23 @ 16:00  OTX-CACAJ-VWHQB-CPKMM/Trop I - -- - --  - --  -  --  /  4762.3<H>    Trend BNP  11-30-23 @ 05:30   -  46114<H>  11-29-23 @ 14:20   -  75010<H>    Procalcitonin Trend  11-30-23 @ 09:50   -   0.47<H>    WBC Trend  12-01-23 @ 05:30   -  11.62<H>  11-30-23 @ 09:50   -  9.39  11-30-23 @ 05:30   -  7.92  11-30-23 @ 01:10   -  6.68  11-29-23 @ 14:20   -  6.95    H/H Trend  12-01-23 @ 05:30   -   8.8<L>/ 26.7<L>  11-30-23 @ 09:50   -   8.9<L>/ 27.2<L>  11-30-23 @ 05:30   -   8.8<L>/ 27.0<L>  11-30-23 @ 01:10   -   8.4<L>/ 25.4<L>  11-29-23 @ 14:20   -   10.1<L>/ 31.1<L>     Platelet Trend  12-01-23 @ 05:30   -  126<L>  11-30-23 @ 09:50   -  124<L>  11-30-23 @ 05:30   -  120<L>  11-30-23 @ 01:10   -  115<L>  11-29-23 @ 14:20   -  140<L>    Trend Sodium  12-01-23 @ 05:30   -  137  11-30-23 @ 05:30   -  138  11-29-23 @ 14:20   -  136    Trend Potassium  12-01-23 @ 05:30   -  4.4  11-30-23 @ 05:30   -  5.1  11-29-23 @ 14:20   -  4.5    Trend Bun/Cr  12-01-23 @ 05:30  BUN/CR -  79<H> / 2.78<H>  11-30-23 @ 05:30  BUN/CR -  45<H> / 2.11<H>  11-29-23 @ 14:20  BUN/CR -  37<H> / 2.08<H>    Lactic Acid Trend  11-29-23 @ 14:20   -   1.9    ABG Trend    Trend AST/ALT/ALK Phos/Bili  11-30-23 @ 05:30   93<H>/29/43/0.4  11-29-23 @ 14:20   47<H>/28/55/0.4       Albumin Trend  11-30-23 @ 05:30   -   2.9<L>  11-29-23 @ 14:20   -   3.6      PTT - PT - INR Trend  12-01-23 @ 00:20   -   85.0<H> - -- - --  11-30-23 @ 17:15   -   80.5<H> - -- - --  11-30-23 @ 09:50   -   54.7<H> - -- - --  11-30-23 @ 05:30   -   58.1<H> - 15.5<H> - 1.37<H>  11-30-23 @ 01:10   -   104.1<H> - -- - --  11-29-23 @ 14:20   -   35.3 - 16.4<H> - 1.42<H>    Glucose Trend  12-01-23 @ 07:46   -  -- -- 168<H>  12-01-23 @ 05:30   -  104<H> -- --  11-30-23 @ 21:22   -  -- -- 265<H>  11-30-23 @ 17:15   -  -- -- 187<H>  11-30-23 @ 12:56   -  -- -- 147<H>  11-30-23 @ 05:30   -  226<H> -- --  11-30-23 @ 05:18   -  -- -- 225<H>  11-29-23 @ 23:49   -  -- -- 439<H>  11-29-23 @ 23:41   -  -- -- 403<H>  11-29-23 @ 14:20   -  317<H> -- --    A1C with Estimated Average Glucose Result: 7.4 % *H* [4.0 - 5.6] (11-30-23 @ 05:30)      LABS:                        8.8    11.62 )-----------( 126      ( 01 Dec 2023 05:30 )             26.7     12-01    137  |  102  |  79<H>  ----------------------------<  104<H>  4.4   |  28  |  2.78<H>    Ca    9.0      01 Dec 2023 05:30  Phos  4.5     12-01  Mg     2.0     12-01    TPro  7.0  /  Alb  2.9<L>  /  TBili  0.4  /  DBili  x   /  AST  93<H>  /  ALT  29  /  AlkPhos  43  11-30     cxr   Right middle lobe infiltrate       VITALS:  T(C): 36.9 (12-01-23 @ 08:00), Max: 36.9 (11-30-23 @ 12:00)  T(F): 98.4 (12-01-23 @ 08:00), Max: 98.5 (11-30-23 @ 12:00)  HR: 66 (12-01-23 @ 10:00) (60 - 91)  BP: 112/81 (12-01-23 @ 10:00) (87/43 - 135/106)  BP(mean): 88 (12-01-23 @ 10:00) (56 - 115)  ABP: --  ABP(mean): --  RR: 19 (12-01-23 @ 10:00) (13 - 21)  SpO2: 98% (12-01-23 @ 10:00) (92% - 100%)  CVP(mm Hg): --  CVP(cm H2O): --    Ins and Outs     11-30-23 @ 07:01  -  12-01-23 @ 07:00  --------------------------------------------------------  IN: 612.5 mL / OUT: 650 mL / NET: -37.5 mL          Weight (kg): 83.6 (11-29-23 @ 21:50)        I&O's Detail    30 Nov 2023 07:01  -  01 Dec 2023 07:00  --------------------------------------------------------  IN:    Heparin Infusion: 212.5 mL    IV PiggyBack: 100 mL    IV PiggyBack: 100 mL    IV PiggyBack: 200 mL  Total IN: 612.5 mL    OUT:    Incontinent per Condom Catheter (mL): 250 mL    Voided (mL): 400 mL  Total OUT: 650 mL    Total NET: -37.5 mL

## 2023-12-01 NOTE — DISCHARGE NOTE NURSING/CASE MANAGEMENT/SOCIAL WORK - PATIENT PORTAL LINK FT
You can access the FollowMyHealth Patient Portal offered by [facility] by registering at the following website: [email or web address]. By joining [employer]s FollowMyHealth portal, you will also be able to view your health information using other applications (apps) compatible with our system. You can access the FollowMyHealth Patient Portal offered by [facility] by registering at the following website: [email or web address]. By joining [employer]’s FollowMyHealth portal, you will also be able to view your health information using other applications (apps) compatible with our system. You can access the FollowMyHealth Patient Portal offered by [facility] by registering at the following website: [email or web address]. By joining [employer]’s FollowMyHealth portal, you will also be able to view your health information using other applications (apps) compatible with our system.

## 2023-12-01 NOTE — DISCHARGE NOTE PROVIDER - NSDCCPCAREPLAN_GEN_ALL_CORE_FT
PRINCIPAL DISCHARGE DIAGNOSIS  Diagnosis: Non-ST elevation MI (NSTEMI)  Assessment and Plan of Treatment:       SECONDARY DISCHARGE DIAGNOSES  Diagnosis: COVID-19  Assessment and Plan of Treatment:     Diagnosis: Acute systolic congestive heart failure  Assessment and Plan of Treatment:

## 2023-12-01 NOTE — DISCHARGE NOTE PROVIDER - [id number]_GEN_ALL_CORE_FT
amLODIPine 10 mg oral tablet: 1 tab(s) orally once a day  apixaban 5 mg oral tablet: 1 tab(s) orally 2 times a day  aspirin 81 mg oral delayed release capsule: orally once a day  folic acid 1 mg oral tablet: 1 tab(s) orally once a day  furosemide 20 mg oral tablet: 1 tab(s) orally 2 times a day  glipiZIDE 5 mg oral tablet: 1 tab(s) orally once a day  losartan 25 mg oral tablet: 1 tab(s) orally once a day  metoprolol succinate 25 mg oral capsule, extended release: 1 cap(s) orally  montelukast 10 mg oral tablet: 1 tab(s) orally once a day  Plavix 75 mg oral tablet: 1 tab(s) orally  pravastatin 40 mg oral tablet: 1 tab(s) orally once a day  SITagliptin 50 mg oral tablet: 1 tab(s) orally once a day  vitamin E dl-alpha 1000 intl units oral capsule: 1 cap(s) orally once a day   acetaminophen 325 mg oral tablet: 2 tab(s) orally every 6 hours As needed Temp greater or equal to 38C (100.4F), Mild Pain (1 - 3)  albuterol 90 mcg/inh inhalation aerosol: 1 puff(s) inhaled every 4 hours As needed Shortness of Breath and/or Wheezing  Aspirin Low Dose 81 mg oral tablet, chewable: 1 tab(s) orally once a day  benzonatate 100 mg oral capsule: 1 cap(s) orally 3 times a day As needed Cough  dexAMETHasone 6 mg/25 mL-NaCl 0.9% intravenous solution: 25 milliliter(s) intravenous once a day  doxycycline 100 mg injection: 100 milligram(s) injectable every 12 hours  folic acid 1 mg oral tablet: 1 tab(s) orally once a day  guaiFENesin 600 mg oral tablet, extended release: 1 tab(s) orally every 12 hours As needed Cough  heparin 100 units/mL-D5% intravenous solution: 950 unit(s) intravenous every hour  insulin glargine 100 units/mL subcutaneous solution: 15 unit(s) subcutaneous once a day (at bedtime)  insulin lispro 100 units/mL injectable solution: injectable 4 times a day (before meals and at bedtime) Before meals as per sliding scale  Metoprolol Tartrate 25 mg oral tablet: 1 tab(s) orally every 12 hours  Multiple Vitamins oral tablet: 1 tab(s) orally once a day  pantoprazole 40 mg oral delayed release tablet: 1 tab(s) orally once a day (before a meal)  remdesivir 5 mg/mL intravenous solution: 100 milligram(s) intravenous once a day  simvastatin 40 mg oral tablet: 1 tab(s) orally once a day (at bedtime)  vitamin E dl-alpha 1000 intl units oral capsule: 1 cap(s) orally once a day

## 2024-01-04 RX ORDER — FOLIC ACID 0.8 MG
1 TABLET ORAL
Refills: 0 | DISCHARGE

## 2024-01-04 RX ORDER — METOPROLOL TARTRATE 50 MG
1 TABLET ORAL
Refills: 0 | DISCHARGE

## 2024-01-04 RX ORDER — APIXABAN 2.5 MG/1
1 TABLET, FILM COATED ORAL
Refills: 0 | DISCHARGE

## 2024-01-04 RX ORDER — ASPIRIN/CALCIUM CARB/MAGNESIUM 324 MG
0 TABLET ORAL
Refills: 0 | DISCHARGE

## 2024-01-04 RX ORDER — AMLODIPINE BESYLATE 2.5 MG/1
1 TABLET ORAL
Refills: 0 | DISCHARGE

## 2024-01-04 RX ORDER — CLOPIDOGREL BISULFATE 75 MG/1
1 TABLET, FILM COATED ORAL
Refills: 0 | DISCHARGE

## 2024-01-04 RX ORDER — VITAMIN E 100 UNIT
1 CAPSULE ORAL
Refills: 0 | DISCHARGE

## 2024-01-04 RX ORDER — MONTELUKAST 4 MG/1
1 TABLET, CHEWABLE ORAL
Refills: 0 | DISCHARGE

## 2024-01-04 RX ORDER — FUROSEMIDE 40 MG
1 TABLET ORAL
Refills: 0 | DISCHARGE

## 2024-01-04 RX ORDER — SITAGLIPTIN 50 MG/1
1 TABLET, FILM COATED ORAL
Refills: 0 | DISCHARGE

## 2024-01-04 RX ORDER — LOSARTAN POTASSIUM 100 MG/1
1 TABLET, FILM COATED ORAL
Refills: 0 | DISCHARGE